# Patient Record
Sex: MALE | Race: WHITE | Employment: OTHER | URBAN - METROPOLITAN AREA
[De-identification: names, ages, dates, MRNs, and addresses within clinical notes are randomized per-mention and may not be internally consistent; named-entity substitution may affect disease eponyms.]

---

## 2019-08-03 ENCOUNTER — APPOINTMENT (OUTPATIENT)
Dept: CT IMAGING | Age: 69
End: 2019-08-03
Attending: EMERGENCY MEDICINE
Payer: MEDICARE

## 2019-08-03 ENCOUNTER — HOSPITAL ENCOUNTER (EMERGENCY)
Age: 69
Discharge: HOME | End: 2019-08-03
Attending: EMERGENCY MEDICINE
Payer: MEDICARE

## 2019-08-03 VITALS
HEART RATE: 64 BPM | DIASTOLIC BLOOD PRESSURE: 84 MMHG | BODY MASS INDEX: 33.8 KG/M2 | TEMPERATURE: 98 F | OXYGEN SATURATION: 98 % | WEIGHT: 223 LBS | SYSTOLIC BLOOD PRESSURE: 164 MMHG | HEIGHT: 68 IN | RESPIRATION RATE: 20 BRPM

## 2019-08-03 DIAGNOSIS — R10.9 FLANK PAIN: Primary | ICD-10-CM

## 2019-08-03 DIAGNOSIS — R16.0 LIVER MASS: ICD-10-CM

## 2019-08-03 LAB
ANION GAP SERPL CALC-SCNC: 7 MMOL/L (ref 7–16)
BACTERIA URNS QL MICRO: 0 /HPF
BASOPHILS # BLD: 0 K/UL (ref 0–0.2)
BASOPHILS NFR BLD: 0 % (ref 0–2)
BUN SERPL-MCNC: 24 MG/DL (ref 8–23)
CALCIUM SERPL-MCNC: 8.3 MG/DL (ref 8.3–10.4)
CASTS URNS QL MICRO: NORMAL /LPF
CHLORIDE SERPL-SCNC: 107 MMOL/L (ref 98–107)
CO2 SERPL-SCNC: 28 MMOL/L (ref 21–32)
CREAT SERPL-MCNC: 1.06 MG/DL (ref 0.8–1.5)
DIFFERENTIAL METHOD BLD: NORMAL
EOSINOPHIL # BLD: 0.1 K/UL (ref 0–0.8)
EOSINOPHIL NFR BLD: 1 % (ref 0.5–7.8)
EPI CELLS #/AREA URNS HPF: NORMAL /HPF
ERYTHROCYTE [DISTWIDTH] IN BLOOD BY AUTOMATED COUNT: 12.8 % (ref 11.9–14.6)
GLUCOSE SERPL-MCNC: 106 MG/DL (ref 65–100)
HCT VFR BLD AUTO: 44.5 % (ref 41.1–50.3)
HGB BLD-MCNC: 14.4 G/DL (ref 13.6–17.2)
IMM GRANULOCYTES # BLD AUTO: 0 K/UL (ref 0–0.5)
IMM GRANULOCYTES NFR BLD AUTO: 0 % (ref 0–5)
LYMPHOCYTES # BLD: 2 K/UL (ref 0.5–4.6)
LYMPHOCYTES NFR BLD: 23 % (ref 13–44)
MCH RBC QN AUTO: 29 PG (ref 26.1–32.9)
MCHC RBC AUTO-ENTMCNC: 32.4 G/DL (ref 31.4–35)
MCV RBC AUTO: 89.5 FL (ref 79.6–97.8)
MONOCYTES # BLD: 0.8 K/UL (ref 0.1–1.3)
MONOCYTES NFR BLD: 9 % (ref 4–12)
NEUTS SEG # BLD: 5.7 K/UL (ref 1.7–8.2)
NEUTS SEG NFR BLD: 67 % (ref 43–78)
NRBC # BLD: 0 K/UL (ref 0–0.2)
PLATELET # BLD AUTO: 235 K/UL (ref 150–450)
PMV BLD AUTO: 9.8 FL (ref 9.4–12.3)
POTASSIUM SERPL-SCNC: 4.2 MMOL/L (ref 3.5–5.1)
RBC # BLD AUTO: 4.97 M/UL (ref 4.23–5.6)
RBC #/AREA URNS HPF: NORMAL /HPF
SODIUM SERPL-SCNC: 142 MMOL/L (ref 136–145)
WBC # BLD AUTO: 8.6 K/UL (ref 4.3–11.1)
WBC URNS QL MICRO: NORMAL /HPF

## 2019-08-03 PROCEDURE — 96360 HYDRATION IV INFUSION INIT: CPT | Performed by: EMERGENCY MEDICINE

## 2019-08-03 PROCEDURE — 85025 COMPLETE CBC W/AUTO DIFF WBC: CPT

## 2019-08-03 PROCEDURE — 74176 CT ABD & PELVIS W/O CONTRAST: CPT

## 2019-08-03 PROCEDURE — 81003 URINALYSIS AUTO W/O SCOPE: CPT | Performed by: EMERGENCY MEDICINE

## 2019-08-03 PROCEDURE — 99284 EMERGENCY DEPT VISIT MOD MDM: CPT | Performed by: EMERGENCY MEDICINE

## 2019-08-03 PROCEDURE — 74011250636 HC RX REV CODE- 250/636: Performed by: EMERGENCY MEDICINE

## 2019-08-03 PROCEDURE — 81015 MICROSCOPIC EXAM OF URINE: CPT

## 2019-08-03 PROCEDURE — 80048 BASIC METABOLIC PNL TOTAL CA: CPT

## 2019-08-03 RX ORDER — TRAMADOL HYDROCHLORIDE 50 MG/1
50 TABLET ORAL
Qty: 9 TAB | Refills: 0 | Status: SHIPPED | OUTPATIENT
Start: 2019-08-03 | End: 2019-08-06

## 2019-08-03 RX ADMIN — SODIUM CHLORIDE 1000 ML: 900 INJECTION, SOLUTION INTRAVENOUS at 13:24

## 2019-08-03 NOTE — DISCHARGE INSTRUCTIONS
Patient Education   Patient Education        Liver Cancer: Care Instructions  Your Care Instructions    Liver cancer occurs when abnormal cells grow out of control in the liver. Liver cancer can spread to other parts of the body, such as the lungs, bones, or the lymph nodes and tissues in the belly. Treatment depends on what type of liver cancer you have and how far it has spread. You may need more than one kind of treatment, such as medicine, surgery, chemotherapy, or radiation therapy. In some cases, other treatments or a liver transplant may be needed. If your cancer cannot be cured, the goal may be to remove or destroy as much of the tumor as possible. This can prevent cancer from growing, spreading, or returning for as long as possible. Your medical team will work with you to help manage the treatment side effects. These can include feeling very tired, feeling sick to your stomach, or having a higher risk for infections. Finding out that you have cancer is scary. You may feel many emotions and may need some help coping. Seek out family, friends, and counselors for support. You also can do things at home to make yourself feel better while you go through treatment. Call the [employer] (3-160.760.2244) or visit its website at [employer] for more information. Follow-up care is a key part of your treatment and safety. Be sure to make and go to all appointments, and call your doctor if you are having problems. It's also a good idea to know your test results and keep a list of the medicines you take. How can you care for yourself at home? · Take your medicines exactly as prescribed. Call your doctor if you have any problems with your medicine. You may get medicine for nausea and vomiting if you have these side effects. · Eat healthy food. If you do not feel like eating, try to eat food that has protein and extra calories to keep up your strength and prevent weight loss.  Drink liquid meal replacements for extra calories and protein. Try to eat your main meal early. Some people do better with small, frequent meals rather than one or two large ones. · Get some physical activity every day, but do not get too tired. Keep doing the hobbies you enjoy as your energy allows. · Take steps to control your stress and workload. Learn relaxation techniques. ? Share your feelings. Stress and tension affect our emotions. By expressing your feelings to others, you may be able to understand and cope with them. ? Consider joining a support group. Talking about a problem with your spouse, a good friend, or other people with similar problems is a good way to reduce tension and stress. ? Express yourself through art. Try writing, crafts, dance, or art to relieve stress. Some dance, writing, or art groups may be available just for people who have cancer. ? Be kind to your body and mind. Getting enough sleep, eating a healthy diet, and taking time to do things you enjoy can contribute to an overall feeling of balance in your life and can help reduce stress. ? Get help if you need it. Discuss your concerns with your doctor, counselor, or other health professional.  · If you are vomiting or have diarrhea:  ? Drink plenty of fluids (enough so that your urine is light yellow or clear like water) to prevent dehydration. Choose water and other caffeine-free clear liquids. If you have kidney, heart, or liver disease and have to limit fluids, talk with your doctor before you increase the amount of fluids you drink. ? When you are able to eat, try clear soups, mild foods, and liquids until all symptoms are gone for 12 to 48 hours. Other good choices include dry toast, crackers, cooked cereal, and gelatin dessert, such as Jell-O.  · If you have not already done so, prepare a list of advance directives.  Advance directives are instructions to your doctor and family members about what kind of care you want if you become unable to speak or express yourself. When should you call for help? Call 911 anytime you think you may need emergency care. For example, call if:    · You have trouble breathing.     · You vomit blood or what looks like coffee grounds.    Call your doctor now or seek immediate medical care if:    · You have any abnormal bleeding, such as:  ? Nosebleeds. ? Vaginal bleeding that is different (heavier, more frequent, at a different time of the month) than what you are used to.  ? Bloody or black stools, or rectal bleeding. ? Bloody or pink urine.     · You have a fever.     · You feel very sleepy or confused.     · You have new or worse belly pain.     · There is a new or increasing yellow tint to your skin or the whites of your eyes.    Watch closely for changes in your health, and be sure to contact your doctor if:    · You have any problems.     · You are gaining weight.     · Your belly is getting bigger. Where can you learn more? Go to http://dimitri-vicki.info/. Enter P483 in the search box to learn more about \"Liver Cancer: Care Instructions. \"  Current as of: December 19, 2018  Content Version: 12.1  © 9807-1237 [employer]. Care instructions adapted under license by [employer] (which disclaims liability or warranty for this information). If you have questions about a medical condition or this instruction, always ask your healthcare professional. [place] [address] any warranty or liability for your use of this information. Flank Pain: Care Instructions  Your Care Instructions  Flank pain is pain on the side of the back just below the rib cage and above the waist. It can be on one or both sides. Flank pain has many possible causes, including a kidney stone, a urinary tract infection, or back strain. Flank pain may get better on its own.  But don't ignore new symptoms, such as fever, nausea and vomiting, urination problems, pain that gets worse, and dizziness. These may be signs of a more serious problem. You may have to have tests or other treatment. Follow-up care is a key part of your treatment and safety. Be sure to make and go to all appointments, and call your doctor if you are having problems. It's also a good idea to know your test results and keep a list of the medicines you take. How can you care for yourself at home? · Rest until you feel better. · Take pain medicines exactly as directed. ? If the doctor gave you a prescription medicine for pain, take it as prescribed. ? If you are not taking a prescription pain medicine, ask your doctor if you can take an over-the-counter pain medicine, such as acetaminophen (Tylenol), ibuprofen (Advil, Motrin), or naproxen (Aleve). Read and follow all instructions on the label. · If your doctor prescribed antibiotics, take them as directed. Do not stop taking them just because you feel better. You need to take the full course of antibiotics. · To apply heat, put a warm water bottle, a heating pad set on low, or a warm cloth on the painful area. Do not go to sleep with a heating pad on your skin. · To prevent dehydration, drink plenty of fluids, enough so that your urine is light yellow or clear like water. Choose water and other caffeine-free clear liquids until you feel better. If you have kidney, heart, or liver disease and have to limit fluids, talk with your doctor before you increase the amount of fluids you drink. When should you call for help? Call your doctor now or seek immediate medical care if:    · Your flank pain gets worse.     · You have new symptoms, such as fever, nausea, or vomiting.     · You have symptoms of a urinary problem. For example:  ? You have blood or pus in your urine. ? You have chills or body aches. ? It hurts to urinate. ?  You have groin or belly pain.    Watch closely for changes in your health, and be sure to contact your doctor if you do not get better as expected. Where can you learn more? Go to http://dimitri-vicki.info/. Enter S191 in the search box to learn more about \"Flank Pain: Care Instructions. \"  Current as of: September 23, 2018  Content Version: 12.1  © 9070-7228 [employer]. Care instructions adapted under license by [employer] (which disclaims liability or warranty for this information). If you have questions about a medical condition or this instruction, always ask your healthcare professional. [address] any warranty or liability for your use of this information.

## 2019-08-03 NOTE — ED NOTES
I have reviewed discharge instructions with the patient and spouse. The patient and spouse verbalized understanding. Patient left ED via Discharge Method: ambulatory to Home with his wife, Kyle Lincoln. Opportunity for questions and clarification provided. Patient given 1 scripts. To continue your aftercare when you leave the hospital, you may receive an automated call from our care team to check in on how you are doing. This is a free service and part of our promise to provide the best care and service to meet your aftercare needs.  If you have questions, or wish to unsubscribe from this service please call [phone]. Thank you for Choosing our [facility] Emergency Department.

## 2019-08-03 NOTE — ED PROVIDER NOTES
77-year-old white male presents with acute onset right-sided flank pain around noon today. He did have nausea but no vomiting or fever. No urinary changes other than decreased urine output during the night last night. Pain radiates toward the right lower quadrant. No aggravating or alleviating factors. Pain was initially severe but is now essentially resolved. The history is provided by the patient. Flank Pain    Pertinent negatives include no chest pain, no fever, no headaches and no abdominal pain. History reviewed. No pertinent past medical history. Past Surgical History:   Procedure Laterality Date    HX HEENT      HX ORTHOPAEDIC      HX TONSIL AND ADENOIDECTOMY           History reviewed. No pertinent family history.     Social History     Socioeconomic History    Marital status:      Spouse name: Not on file    Number of children: Not on file    Years of education: Not on file    Highest education level: Not on file   Occupational History    Not on file   Social Needs    Financial resource strain: Not on file    Food insecurity:     Worry: Not on file     Inability: Not on file    Transportation needs:     Medical: Not on file     Non-medical: Not on file   Tobacco Use    Smoking status: Never Smoker    Smokeless tobacco: Never Used   Substance and Sexual Activity    Alcohol use: Yes     Comment: occassionally    Drug use: Never    Sexual activity: Yes     Partners: Female   Lifestyle    Physical activity:     Days per week: Not on file     Minutes per session: Not on file    Stress: Not on file   Relationships    Social connections:     Talks on phone: Not on file     Gets together: Not on file     Attends Orthodoxy service: Not on file     Active member of club or organization: Not on file     Attends meetings of clubs or organizations: Not on file     Relationship status: Not on file    Intimate partner violence:     Fear of current or ex partner: Not on file Emotionally abused: Not on file     Physically abused: Not on file     Forced sexual activity: Not on file   Other Topics Concern    Not on file   Social History Narrative    Not on file         ALLERGIES: Patient has no known allergies. Review of Systems   Constitutional: Negative for fever. HENT: Negative for congestion. Respiratory: Negative for cough and shortness of breath. Cardiovascular: Negative for chest pain. Gastrointestinal: Positive for nausea. Negative for abdominal pain and vomiting. Genitourinary: Positive for flank pain. Musculoskeletal: Negative for back pain and neck pain. Skin: Negative for rash. Neurological: Negative for headaches. Vitals:    08/03/19 1235 08/03/19 1322   BP: (!) 180/95    Pulse: (!) 51    Resp: 20    Temp: 97.6 °F (36.4 °C)    SpO2: 97% 98%   Weight: 101.2 kg (223 lb)    Height: 5' 8\" (1.727 m)             Physical Exam   Constitutional: He is oriented to person, place, and time. He appears well-developed and well-nourished. No distress. HENT:   Head: Normocephalic and atraumatic. Eyes: Pupils are equal, round, and reactive to light. Conjunctivae are normal.   Neck: Normal range of motion. Neck supple. Cardiovascular: Normal rate and regular rhythm. Pulmonary/Chest: Effort normal and breath sounds normal.   Abdominal: Soft. He exhibits no distension. There is no tenderness. There is no guarding. Musculoskeletal: Normal range of motion. He exhibits no edema. Neurological: He is alert and oriented to person, place, and time. Skin: Skin is warm and dry. Psychiatric: He has a normal mood and affect. His behavior is normal.   Nursing note and vitals reviewed. MDM  Number of Diagnoses or Management Options  Diagnosis management comments: Lead work is unremarkable. Analysis shows no infection. CT abdomen pelvis shows no renal or ureteral stones. No explanation for his pain.   There is an incidental 3.2 cm x 2.5 cm left hepatic lobe lesion of uncertain etiology. It is unlikely to be related to his pain. She does from [place] and will not be returning home for approximately 1 week. I would prefer to have the MRI performed within this time. Therefore will order an outpatient MRI to be performed this coming Monday or Tuesday. We will have the patient call MRI scheduling Monday morning for time. Patient is comfortable and appears safe for discharge home.         Amount and/or Complexity of Data Reviewed  Clinical lab tests: ordered and reviewed  Tests in the radiology section of CPT®: ordered and reviewed  Independent visualization of images, tracings, or specimens: yes    Risk of Complications, Morbidity, and/or Mortality  Presenting problems: moderate  Diagnostic procedures: moderate  Management options: moderate           Procedures

## 2019-08-03 NOTE — ED TRIAGE NOTES
Pt states that he had a sudden onset of right flank pain this morning with nausea and vomiting.   Recently treated for bronchitis with an antibiotic and codeine cough syrup

## 2020-10-09 PROBLEM — M10.9 GOUT: Status: ACTIVE | Noted: 2020-10-09

## 2020-10-09 PROBLEM — G50.0 TRIGEMINAL NEURALGIA: Status: ACTIVE | Noted: 2020-10-09

## 2020-11-06 PROBLEM — N40.1 BENIGN PROSTATIC HYPERPLASIA WITH LOWER URINARY TRACT SYMPTOMS: Status: ACTIVE | Noted: 2020-11-06

## 2020-11-06 PROBLEM — E78.00 PURE HYPERCHOLESTEROLEMIA: Status: ACTIVE | Noted: 2020-11-06

## 2020-11-06 PROBLEM — N52.9 ERECTILE DYSFUNCTION: Status: ACTIVE | Noted: 2020-11-06

## 2021-02-09 ENCOUNTER — HOSPITAL ENCOUNTER (OUTPATIENT)
Dept: SURGERY | Age: 71
Discharge: HOME | End: 2021-02-09
Attending: ORTHOPAEDIC SURGERY
Payer: MEDICARE

## 2021-02-09 ENCOUNTER — HOSPITAL ENCOUNTER (OUTPATIENT)
Dept: PHYSICAL THERAPY | Age: 71
Discharge: HOME | End: 2021-02-09
Attending: ORTHOPAEDIC SURGERY
Payer: MEDICARE

## 2021-02-09 ENCOUNTER — HOME HEALTH ADMISSION (OUTPATIENT)
Dept: HOME HEALTH SERVICES | Facility: HOME HEALTH | Age: 71
End: 2021-02-09
Payer: MEDICARE

## 2021-02-09 VITALS
TEMPERATURE: 97.9 F | BODY MASS INDEX: 35.52 KG/M2 | RESPIRATION RATE: 14 BRPM | SYSTOLIC BLOOD PRESSURE: 145 MMHG | HEART RATE: 72 BPM | OXYGEN SATURATION: 96 % | DIASTOLIC BLOOD PRESSURE: 75 MMHG | HEIGHT: 68 IN | WEIGHT: 234.4 LBS

## 2021-02-09 LAB
ALBUMIN SERPL-MCNC: 3.6 G/DL (ref 3.2–4.6)
ANION GAP SERPL CALC-SCNC: 4 MMOL/L (ref 7–16)
APTT PPP: 27.7 SEC (ref 24.1–35.1)
BACTERIA SPEC CULT: NORMAL
BASOPHILS # BLD: 0 K/UL (ref 0–0.2)
BASOPHILS NFR BLD: 1 % (ref 0–2)
BUN SERPL-MCNC: 24 MG/DL (ref 8–23)
CALCIUM SERPL-MCNC: 8.8 MG/DL (ref 8.3–10.4)
CHLORIDE SERPL-SCNC: 107 MMOL/L (ref 98–107)
CO2 SERPL-SCNC: 29 MMOL/L (ref 21–32)
CREAT SERPL-MCNC: 0.82 MG/DL (ref 0.8–1.5)
DIFFERENTIAL METHOD BLD: NORMAL
EOSINOPHIL # BLD: 0.1 K/UL (ref 0–0.8)
EOSINOPHIL NFR BLD: 2 % (ref 0.5–7.8)
ERYTHROCYTE [DISTWIDTH] IN BLOOD BY AUTOMATED COUNT: 13.2 % (ref 11.9–14.6)
EST. AVERAGE GLUCOSE BLD GHB EST-MCNC: 134 MG/DL
GLUCOSE SERPL-MCNC: 114 MG/DL (ref 65–100)
HBA1C MFR BLD: 6.3 % (ref 4.2–6.3)
HCT VFR BLD AUTO: 43 % (ref 41.1–50.3)
HGB BLD-MCNC: 14.2 G/DL (ref 13.6–17.2)
IMM GRANULOCYTES # BLD AUTO: 0 K/UL (ref 0–0.5)
IMM GRANULOCYTES NFR BLD AUTO: 1 % (ref 0–5)
INR PPP: 1
LYMPHOCYTES # BLD: 2 K/UL (ref 0.5–4.6)
LYMPHOCYTES NFR BLD: 23 % (ref 13–44)
MCH RBC QN AUTO: 28.9 PG (ref 26.1–32.9)
MCHC RBC AUTO-ENTMCNC: 33 G/DL (ref 31.4–35)
MCV RBC AUTO: 87.6 FL (ref 79.6–97.8)
MONOCYTES # BLD: 0.6 K/UL (ref 0.1–1.3)
MONOCYTES NFR BLD: 7 % (ref 4–12)
NEUTS SEG # BLD: 5.7 K/UL (ref 1.7–8.2)
NEUTS SEG NFR BLD: 67 % (ref 43–78)
NRBC # BLD: 0 K/UL (ref 0–0.2)
PLATELET # BLD AUTO: 264 K/UL (ref 150–450)
PMV BLD AUTO: 9.9 FL (ref 9.4–12.3)
POTASSIUM SERPL-SCNC: 4.4 MMOL/L (ref 3.5–5.1)
PROTHROMBIN TIME: 13.2 SEC (ref 12.5–14.7)
RBC # BLD AUTO: 4.91 M/UL (ref 4.23–5.6)
SERVICE CMNT-IMP: NORMAL
SODIUM SERPL-SCNC: 140 MMOL/L (ref 136–145)
WBC # BLD AUTO: 8.5 K/UL (ref 4.3–11.1)

## 2021-02-09 PROCEDURE — 97161 PT EVAL LOW COMPLEX 20 MIN: CPT

## 2021-02-09 PROCEDURE — 85730 THROMBOPLASTIN TIME PARTIAL: CPT

## 2021-02-09 PROCEDURE — 85025 COMPLETE CBC W/AUTO DIFF WBC: CPT

## 2021-02-09 PROCEDURE — 80048 BASIC METABOLIC PNL TOTAL CA: CPT

## 2021-02-09 PROCEDURE — 77030027138 HC INCENT SPIROMETER -A

## 2021-02-09 PROCEDURE — 87641 MR-STAPH DNA AMP PROBE: CPT

## 2021-02-09 PROCEDURE — 93005 ELECTROCARDIOGRAM TRACING: CPT

## 2021-02-09 PROCEDURE — 85610 PROTHROMBIN TIME: CPT

## 2021-02-09 PROCEDURE — 83036 HEMOGLOBIN GLYCOSYLATED A1C: CPT

## 2021-02-09 PROCEDURE — 80307 DRUG TEST PRSMV CHEM ANLYZR: CPT

## 2021-02-09 PROCEDURE — 82040 ASSAY OF SERUM ALBUMIN: CPT

## 2021-02-09 PROCEDURE — 36415 COLL VENOUS BLD VENIPUNCTURE: CPT

## 2021-02-09 RX ORDER — ASCORBIC ACID 500 MG
500 TABLET ORAL DAILY
COMMUNITY

## 2021-02-09 NOTE — PROGRESS NOTES
Megna Bernabe  : [birth year](89 y.o.) [facility] at [address], [place] [address].  [phone]       Physical Therapy Prehab Plan of Treatment and Evaluation Summary:2021    ICD-10: Treatment Diagnosis:   · Pain in Right Knee (M25.561)  · Stiffness of Right Knee, Not elsewhere classified (M25.661)  Precautions/Allergies:   Patient has no known allergies. 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 He is planning on going home at [place] with his spouse's assist. He is motivated and prepared. PROBLEM LIST (Impacting functional limitations):  Mr. Marleen Cowan presents with the following right lower extremity(s) problems:  1. Strength  2. Range of Motion  3. Home Exercise Program  4. Pain   INTERVENTIONS PLANNED:  1. Home Exercise Program  2. Educational Discussion      TREATMENT PLAN: Effective Dates: 2021 TO 2021. Frequency/Duration: Patient to continue to perform home exercise program at least twice per day up until his surgery. GOALS: (Goals have been discussed and agreed upon with patient.)  Discharge Goals: Time Frame: 1 Day  1. Patient will demonstrate independence with a home exercise program designed to increase strength, range of motion and pain control to minimize functional deficits and optimize patient for total joint replacement. Rehabilitation Potential For Stated Goals: Good  Regarding Patrice Irizarry's therapy, I certify that the treatment plan above will be carried out by a therapist or under their direction.   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 Nicolette Flores  has a past medical history of Allergies, Arthritis, Calculus of kidney, Cataracts, bilateral, Headache, History of elevated PSA, and Trigeminal neuralgia. Mr. Nicolette Flores  has a past surgical history that includes hx tonsil and adenoidectomy; hx prostate surgery (2015); hx heent; hx cataract removal (Bilateral); hx colonoscopy; and hx orthopaedic (Right, 2012, 2014). Social History/Living Environment:   Home Environment: Private residence  # Steps to Enter: 3  Rails to Enter: No  One/Two Story Residence: One story(1 step to a sunken den.  No rail)  Support Systems: Spouse/Significant Other/Partner  Patient Expects to be Discharged to[de-identified] Private residence  Current DME Used/Available at Home: Jewell Medellin, keisha, Froylan Diane, rolling, [facility], Shower chair, Raised toilet seat  Tub or Shower Type: Shower  Work/Activity:  retired  Dominant Side:  LEFT  Current Medications:  See Pre-assessment nursing note   Number of Personal Factors/Comorbidities that affect the Plan of Care: 1-2: MODERATE COMPLEXITY   EXAMINATION:   ADLs (Current Functional Status):   Ambulation:  [x] Independent  [] Walk Indoors Only  [x] Walk Outdoors  [] Use Assistive Device  [] Use Wheelchair Only Dressing:  [x] Independent  Requires Assistance from Someone for:  [] Sock/Shoes  [] Pants  [] Everything   Bathing/Showering:   [x] Independent  [] Requires Assistance from Someone  [] [address]:  [x] Routine house and yard work  [] Light Housework Only  [] None   Observation/Orthostatic Postural Assessment:    Genu varus left, Genu varus right, Leg length discrepancy, right(R LE 1/8\" shorter than L)  ROM/Flexibility:   AROM: Within functional limits(L LE)                       RLE AROM  R Knee Flexion: 101  R Knee Extension: 15   Strength:   Strength: Generally decreased, functional(L LE 4/5)              RLE Strength  R Hip Flexion: 4  R Knee Extension: 3+  R Ankle Dorsiflexion: 4   Functional Mobility:    Sensation: Intact(L LE)    Stand to Sit: Independent  Sit to Stand: Independent  Stand Pivot Transfers: Independent             Balance:    Sitting: Intact  Standing: Intact   Body Structures Involved:  1. Bones  2. Joints  3. Muscles Body Functions Affected:  1. Movement Related Activities and Participation Affected:  1. General Tasks and Demands  2. Mobility   Number of elements that affect the Plan of Care: 4+: HIGH COMPLEXITY   CLINICAL PRESENTATION:   Presentation: Stable and uncomplicated: LOW COMPLEXITY   CLINICAL DECISION MAKING:   Outcome Measure: Tool Used: Lower Extremity Functional Scale (LEFS)  Score:  Initial: 24/80 Most Recent: X/80 (Date: -- )   Interpretation of Score: 20 questions each scored on a 5 point scale with 0 representing \"extreme difficulty or unable to perform\" and 4 representing \"no difficulty\". The lower the score, the greater the functional disability. 80/80 represents no disability. Minimal detectable change is 9 points. Medical Necessity:   · Mr. Yrn Fischer is expected to optimize his lower extremity strength and ROM in preparation for joint replacement surgery. Reason for Services/Other Comments:  · Achieve baseline assesment of musculoskeletal system, functional mobility and home environment. , educate in PT HEP in preparation for surgery, educate in hospital plan of care. Use of outcome tool(s) and clinical judgement create a POC that gives a: Clear prediction of patient's progress: LOW COMPLEXITY   TREATMENT:   Treatment/Session Assessment:  Patient was instructed in PT- HEP to increase strength and ROM in LEs. Answered all questions. · Post session pain:  2  · Compliance with Program/Exercises: compliant most of the time.   Total Treatment Duration:  PT Patient Time In/Time Out  Time In: 1215  Time Out: [address], [address]

## 2021-02-09 NOTE — PERIOP NOTES
PLEASE CONTINUE TAKING ALL PRESCRIPTION MEDICATIONS UP TO THE DAY OF SURGERY UNLESS OTHERWISE DIRECTED BELOW. DISCONTINUE all vitamins and supplements 21 days prior to surgery. DISCONTINUE Non-Steriodal Anti-Inflammatory (NSAIDS) such as Advil and Aleve 5 days prior to surgery. Home Medications to take  the day of surgery   Gabapentin  loratadine            Home Medications   to Hold           Comments    [place] [address] [place]    May continue vitamin C, and zinc up until day of surgery   *Visitor policy of 1 visitor per patient discussed. Please do not bring home medications with you on the day of surgery unless otherwise directed by your nurse. If you are instructed to bring home medications, please give them to your nurse as they will be administered by the nursing staff. If you have any questions, please call [facility] [phone]   A copy of this note was provided to the patient for reference.

## 2021-02-09 NOTE — PROGRESS NOTES
[facility] Case Management note:  Patient screened in Prehab for discharge planning needs. Patient scheduled for a future total joint replacement. We discussed Home Health and equipment needed after surgery. List of Home Health providers offered. Patient w/o preference towards provider. Initial referral sent to [facility].   Will need a walker and will let us know if needs [facility]

## 2021-02-09 NOTE — PERIOP NOTES
Lab results within limits per anesthesia protocol; OK for surgery. Recent Results (from the past 12 hour(s))   CBC WITH AUTOMATED DIFF    Collection Time: 02/09/21 12:09 PM   Result Value Ref Range    WBC 8.5 4.3 - 11.1 K/uL    RBC 4.91 4.23 - 5.6 M/uL    HGB 14.2 13.6 - 17.2 g/dL    HCT 43.0 41.1 - 50.3 %    MCV 87.6 79.6 - 97.8 FL    MCH 28.9 26.1 - 32.9 PG    MCHC 33.0 31.4 - 35.0 g/dL    RDW 13.2 11.9 - 14.6 %    PLATELET 597 017 - 587 K/uL    MPV 9.9 9.4 - 12.3 FL    ABSOLUTE NRBC 0.00 0.0 - 0.2 K/uL    DF AUTOMATED      NEUTROPHILS 67 43 - 78 %    LYMPHOCYTES 23 13 - 44 %    MONOCYTES 7 4.0 - 12.0 %    EOSINOPHILS 2 0.5 - 7.8 %    BASOPHILS 1 0.0 - 2.0 %    IMMATURE GRANULOCYTES 1 0.0 - 5.0 %    ABS. NEUTROPHILS 5.7 1.7 - 8.2 K/UL    ABS. LYMPHOCYTES 2.0 0.5 - 4.6 K/UL    ABS. MONOCYTES 0.6 0.1 - 1.3 K/UL    ABS. EOSINOPHILS 0.1 0.0 - 0.8 K/UL    ABS. BASOPHILS 0.0 0.0 - 0.2 K/UL    ABS. IMM.  GRANS. 0.0 0.0 - 0.5 K/UL   HEMOGLOBIN A1C WITH EAG    Collection Time: 02/09/21 12:09 PM   Result Value Ref Range    Hemoglobin A1c 6.3 4.20 - 6.30 %    Est. average glucose 024 mg/dL   METABOLIC PANEL, BASIC    Collection Time: 02/09/21 12:09 PM   Result Value Ref Range    Sodium 140 136 - 145 mmol/L    Potassium 4.4 3.5 - 5.1 mmol/L    Chloride 107 98 - 107 mmol/L    CO2 29 21 - 32 mmol/L    Anion gap 4 (L) 7 - 16 mmol/L    Glucose 114 (H) 65 - 100 mg/dL    BUN 24 (H) 8 - 23 MG/DL    Creatinine 0.82 0.8 - 1.5 MG/DL    GFR est AA >60 >60 ml/min/1.73m2    GFR est non-AA >60 >60 ml/min/1.73m2    Calcium 8.8 8.3 - 10.4 MG/DL   PROTHROMBIN TIME + INR    Collection Time: 02/09/21 12:09 PM   Result Value Ref Range    Prothrombin time 13.2 12.5 - 14.7 sec    INR 1.0     PTT    Collection Time: 02/09/21 12:09 PM   Result Value Ref Range    aPTT 27.7 24.1 - 35.1 SEC   ALBUMIN    Collection Time: 02/09/21 12:09 PM   Result Value Ref Range    Albumin 3.6 3.2 - 4.6 g/dL

## 2021-02-09 NOTE — PROGRESS NOTES
21 1200   Oxygen Therapy   O2 Sat (%) 96 %   Pulse via Oximetry 80 beats per minute   O2 Device Room air   Pre-Treatment   Breath Sounds Bilateral Clear;Diminished   Pre FEV1 (liters) 2.2 liters   % Predicted 75     Initial respiratory Assessment completed with pt. Pt was interviewed and evaluated in [facility] prior to surgery. Patient ID:  Crissy Salvador  [id number]  82 y.o.  1950  Surgeon: Dr. Diamond Canseco  Date of Surgery: The linked surgery was not found. Please check manually. Procedure: Total Right Knee Arthroplasty  Primary Care Physician: Devon Peña [place] [phone]  Specialists:     Pt. IS SOMEWHAT PRACTICING SOCIAL DISTANCING AND WEARING A MASK WHEN IN PUBLIC. Pt taught proper COUGH technique  DIAPHRAGMATIC BREATHING EXERCISE INSTRUCTIONS GIVEN    History of smokin/2 YEAR OCCASIONAL CIGAR                 Quit date:      10 YEARS AGO   Secondhand smoke:FATHER    Past procedures with Oxygen desaturation or delayed awakening:DELAYED AWAKENING    Past Medical History:   Diagnosis Date    Allergies     environmental    Arthritis     osteo    Calculus of kidney     Cataracts, bilateral     Headache     History of elevated PSA     BPH. stable w/meds    Sleep apnea     sleep study performed, pt could not tolerate Cpap    Trigeminal neuralgia 2014    right side of face/head        Respiratory history:T BRONCHITIS                                 SOB  ON EXERTION                                    Respiratory meds:  DENIES    FAMILY PRESENT:             NO                                                   PAST SLEEP STUDY:        YES               - IN [place]      HX OF CLAIRE:                        YES                     CLAIRE assessment:                                                 PHYSICAL EXAM   Body mass index is 35.64 kg/m².    Visit Vitals  BP (!) 145/75 (BP 1 Location: Right upper arm, BP Patient Position: Sitting)   Pulse 72   Temp 97.9 °F (36.6 °C)   Resp 14   Ht 5' 8\" (1.727 m)   Wt 106.3 kg (234 lb 6.4 oz)   SpO2 96%   BMI 35.64 kg/m²     Neck circumference:  48    cm    Loud snoring:                                                 YES             Witnessed apnea or wakening gasping or choking:        USED TO HAVE APNEA-PT STATES       Awakens with headaches:                                               DENIES  Morning or daytime tiredness/ sleepiness:                             TIRED  Dry mouth or sore throat in morning:            YES                                              [place] stage:  4                                   SACS score:80  Stop Bang   STOP-BANG  Does the patient snore loudly (louder than talking or loud enough to be heard through closed doors)?: Yes  Does the patient often feel tired, fatigued, or sleepy during the daytime, even after a \"good\" night's sleep?: Yes  Has anyone ever observed the patient stop breathing during their sleep? : Yes  Does the patient have or are they being treated for high blood pressure?: No  Is the patient's BMI greater than 35?: Yes  Is your neck circumference greater than 17 inches (Male) or 16 inches (Female)?: Yes  Is the patient older than 48?: Yes  Is the patient male?: Yes  CLAIRE Score: 7  Has the patient been referred to Sleep Medicine?: No  Has the patient previously been diagnosed with Obstructive Sleep Apnea?: Yes  Treated or Untreated?: Untreated                            PT NON-COMPLIANT with CPAP. Dangers of non-compliance with treatment of CLAIRE explained to pt. CLAIRE handouts given to pt. CONT. SAT MONITOR HS after surgery. O2 @ 3 lpm NC HS  If BMI 40 or greater PEP therapy QID.                                         Referrals:    Pt. Phone Number:

## 2021-02-09 NOTE — PERIOP NOTES
Patient verified name and . Order for consent  found in EHR and matches case posting; patient verified. Type III surgery, walk in assessment complete. Labs per surgeon: CBC,BMP, PT/PTT, albumin,hgb A1c, urine nicotin ; results within anesthesia protocol. Nicotine pending  Labs per anesthesia protocol: no additional  EKG:performed- results normal sinus rhythm. Patient COVID test date 21; Patient aware. The testing center [address] provided to the patient. MRSA/MSSA swab collected; pharmacy to review and dose antibiotic as appropriate. Hospital approved surgical skin cleanser and instructions to return bottle on DOS given per hospital policy. Patient provided with handouts including Guide to Surgery, Pain Management, Hand Hygiene, Blood Transfusion Education, and [facility] Brochure. Patient answered medical/surgical history questions at their best of ability. All prior to admission medications documented in [facility]. Original medication prescription bottle  visualized during patient appointment. Patient instructed to hold all vitamins 3 weeks prior to surgery and NSAIDS 5 days prior to surgery. Patient teach back successful and patient demonstrates knowledge of instruction.

## 2021-02-10 LAB
ATRIAL RATE: 72 BPM
CALCULATED P AXIS, ECG09: -29 DEGREES
CALCULATED R AXIS, ECG10: -11 DEGREES
CALCULATED T AXIS, ECG11: -2 DEGREES
COTININE UR QL SCN: NEGATIVE NG/ML
DIAGNOSIS, 93000: NORMAL
DRUG SCREEN COMMENT:, 753798: NORMAL
P-R INTERVAL, ECG05: 168 MS
Q-T INTERVAL, ECG07: 396 MS
QRS DURATION, ECG06: 94 MS
QTC CALCULATION (BEZET), ECG08: 433 MS
VENTRICULAR RATE, ECG03: 72 BPM

## 2021-02-11 NOTE — PERIOP NOTES
Nam Johnson [[id number]] (Order [id number])  Lab  Date: 2/9/2021 Department: [facility] Pre Assessment Released By: Elodia Bobo RN (auto-released) Authorizing: Romulo Eckert MD   Component Value Flag Ref Range Units Status   Cotinine Screen, urine Negative   Ymujmq=251 ng/mL Final   Drug Screen Comment: Comment

## 2021-02-25 RX ORDER — CEFAZOLIN SODIUM/WATER 2 G/20 ML
2 SYRINGE (ML) INTRAVENOUS ONCE
Status: CANCELLED | OUTPATIENT
Start: 2021-02-25 | End: 2021-02-25

## 2021-02-28 ENCOUNTER — ANESTHESIA EVENT (OUTPATIENT)
Dept: SURGERY | Age: 71
DRG: 470 | End: 2021-02-28
Payer: MEDICARE

## 2021-03-01 ENCOUNTER — HOSPITAL ENCOUNTER (INPATIENT)
Age: 71
LOS: 2 days | Discharge: HOME HEALTH | DRG: 470 | End: 2021-03-03
Attending: ORTHOPAEDIC SURGERY | Admitting: ORTHOPAEDIC SURGERY
Payer: MEDICARE

## 2021-03-01 ENCOUNTER — ANESTHESIA (OUTPATIENT)
Dept: SURGERY | Age: 71
DRG: 470 | End: 2021-03-01
Payer: MEDICARE

## 2021-03-01 DIAGNOSIS — M17.11 PRIMARY OSTEOARTHRITIS OF RIGHT KNEE: Primary | ICD-10-CM

## 2021-03-01 LAB
GLUCOSE BLD STRIP.AUTO-MCNC: 121 MG/DL (ref 65–100)
HGB BLD-MCNC: 13.7 G/DL (ref 13.6–17.2)

## 2021-03-01 PROCEDURE — 77030033067 HC SUT PDO STRATFX SPIR J&J -B: Performed by: ORTHOPAEDIC SURGERY

## 2021-03-01 PROCEDURE — 36415 COLL VENOUS BLD VENIPUNCTURE: CPT

## 2021-03-01 PROCEDURE — 77030003665 HC NDL SPN BBMI -A: Performed by: NURSE ANESTHETIST, CERTIFIED REGISTERED

## 2021-03-01 PROCEDURE — 74011250636 HC RX REV CODE- 250/636: Performed by: NURSE ANESTHETIST, CERTIFIED REGISTERED

## 2021-03-01 PROCEDURE — 74011250637 HC RX REV CODE- 250/637: Performed by: ANESTHESIOLOGY

## 2021-03-01 PROCEDURE — 97110 THERAPEUTIC EXERCISES: CPT

## 2021-03-01 PROCEDURE — 77030020044 HC CLD THERAPY UNIT -B

## 2021-03-01 PROCEDURE — 77030003665 HC NDL SPN BBMI -A: Performed by: ANESTHESIOLOGY

## 2021-03-01 PROCEDURE — 0SRC0JA REPLACEMENT OF RIGHT KNEE JOINT WITH SYNTHETIC SUBSTITUTE, UNCEMENTED, OPEN APPROACH: ICD-10-PCS | Performed by: ORTHOPAEDIC SURGERY

## 2021-03-01 PROCEDURE — 74011250637 HC RX REV CODE- 250/637: Performed by: NURSE PRACTITIONER

## 2021-03-01 PROCEDURE — 76060000034 HC ANESTHESIA 1.5 TO 2 HR: Performed by: ORTHOPAEDIC SURGERY

## 2021-03-01 PROCEDURE — 77030007880 HC KT SPN EPDRL BBMI -B: Performed by: NURSE ANESTHETIST, CERTIFIED REGISTERED

## 2021-03-01 PROCEDURE — 74011250636 HC RX REV CODE- 250/636: Performed by: ORTHOPAEDIC SURGERY

## 2021-03-01 PROCEDURE — 76010010054 HC POST OP PAIN BLOCK: Performed by: ORTHOPAEDIC SURGERY

## 2021-03-01 PROCEDURE — 97535 SELF CARE MNGMENT TRAINING: CPT

## 2021-03-01 PROCEDURE — 77030038023 HC PIN FIX MAKO STRY -B: Performed by: ORTHOPAEDIC SURGERY

## 2021-03-01 PROCEDURE — 77030029820: Performed by: ORTHOPAEDIC SURGERY

## 2021-03-01 PROCEDURE — 77030040361 HC SLV COMPR DVT MDII -B

## 2021-03-01 PROCEDURE — 77030003602 HC NDL NRV BLK BBMI -B: Performed by: ANESTHESIOLOGY

## 2021-03-01 PROCEDURE — 77030027520 HC SEAL BPLR AQMNTYS MEDT -F: Performed by: ORTHOPAEDIC SURGERY

## 2021-03-01 PROCEDURE — 65270000029 HC RM PRIVATE

## 2021-03-01 PROCEDURE — 94762 N-INVAS EAR/PLS OXIMTRY CONT: CPT

## 2021-03-01 PROCEDURE — 94760 N-INVAS EAR/PLS OXIMETRY 1: CPT

## 2021-03-01 PROCEDURE — 76010000875 HC OR TIME 1.5 TO 2HR INTENSV - TIER 2: Performed by: ORTHOPAEDIC SURGERY

## 2021-03-01 PROCEDURE — 74011000250 HC RX REV CODE- 250: Performed by: NURSE PRACTITIONER

## 2021-03-01 PROCEDURE — 8E0Y0CZ ROBOTIC ASSISTED PROCEDURE OF LOWER EXTREMITY, OPEN APPROACH: ICD-10-PCS | Performed by: ORTHOPAEDIC SURGERY

## 2021-03-01 PROCEDURE — C1776 JOINT DEVICE (IMPLANTABLE): HCPCS | Performed by: ORTHOPAEDIC SURGERY

## 2021-03-01 PROCEDURE — 77030038149 HC BLD SAW SAG STRY -D: Performed by: ORTHOPAEDIC SURGERY

## 2021-03-01 PROCEDURE — 74011250636 HC RX REV CODE- 250/636: Performed by: ANESTHESIOLOGY

## 2021-03-01 PROCEDURE — 82962 GLUCOSE BLOOD TEST: CPT

## 2021-03-01 PROCEDURE — 77030040922 HC BLNKT HYPOTHRM STRY -A: Performed by: ANESTHESIOLOGY

## 2021-03-01 PROCEDURE — 97161 PT EVAL LOW COMPLEX 20 MIN: CPT

## 2021-03-01 PROCEDURE — 85018 HEMOGLOBIN: CPT

## 2021-03-01 PROCEDURE — 74011000250 HC RX REV CODE- 250: Performed by: NURSE ANESTHETIST, CERTIFIED REGISTERED

## 2021-03-01 PROCEDURE — 97165 OT EVAL LOW COMPLEX 30 MIN: CPT

## 2021-03-01 PROCEDURE — 74011250636 HC RX REV CODE- 250/636: Performed by: NURSE PRACTITIONER

## 2021-03-01 PROCEDURE — 77030012935 HC DRSG AQUACEL BMS -B: Performed by: ORTHOPAEDIC SURGERY

## 2021-03-01 PROCEDURE — C1713 ANCHOR/SCREW BN/BN,TIS/BN: HCPCS | Performed by: ORTHOPAEDIC SURGERY

## 2021-03-01 PROCEDURE — 77030002922 HC SUT FBRWRE ARTH -B: Performed by: ORTHOPAEDIC SURGERY

## 2021-03-01 PROCEDURE — 2709999900 HC NON-CHARGEABLE SUPPLY: Performed by: ORTHOPAEDIC SURGERY

## 2021-03-01 PROCEDURE — 77030029828 HC FEM TIB CKPNT KT DISP STRY -B: Performed by: ORTHOPAEDIC SURGERY

## 2021-03-01 PROCEDURE — 76210000006 HC OR PH I REC 0.5 TO 1 HR: Performed by: ORTHOPAEDIC SURGERY

## 2021-03-01 PROCEDURE — 2709999900 HC NON-CHARGEABLE SUPPLY

## 2021-03-01 PROCEDURE — 76942 ECHO GUIDE FOR BIOPSY: CPT | Performed by: ORTHOPAEDIC SURGERY

## 2021-03-01 PROCEDURE — 77030002933 HC SUT MCRYL J&J -A: Performed by: ORTHOPAEDIC SURGERY

## 2021-03-01 DEVICE — CRUCIATE RETAINING FEMORAL
Type: IMPLANTABLE DEVICE | Site: KNEE | Status: FUNCTIONAL
Brand: TRIATHLON

## 2021-03-01 DEVICE — KNEE K2 TOT HEMI ADV CMTLS -- IMPL CAPPED K2: Type: IMPLANTABLE DEVICE | Status: FUNCTIONAL

## 2021-03-01 DEVICE — TIBIAL COMPONENT
Type: IMPLANTABLE DEVICE | Site: KNEE | Status: FUNCTIONAL
Brand: TRIATHLON

## 2021-03-01 DEVICE — INSERT TIB CS 5 10 MM ARTC KNEE BEAR TECHNOLOGY TRIATHLON: Type: IMPLANTABLE DEVICE | Site: KNEE | Status: FUNCTIONAL

## 2021-03-01 RX ORDER — CEFAZOLIN SODIUM/WATER 2 G/20 ML
2 SYRINGE (ML) INTRAVENOUS EVERY 8 HOURS
Status: COMPLETED | OUTPATIENT
Start: 2021-03-01 | End: 2021-03-02

## 2021-03-01 RX ORDER — CYCLOBENZAPRINE HCL 5 MG
5 TABLET ORAL
Qty: 30 TAB | Refills: 0 | Status: SHIPPED | OUTPATIENT
Start: 2021-03-01 | End: 2021-05-11 | Stop reason: SDUPTHER

## 2021-03-01 RX ORDER — TAMSULOSIN HYDROCHLORIDE 0.4 MG/1
0.4 CAPSULE ORAL DAILY
Status: DISCONTINUED | OUTPATIENT
Start: 2021-03-02 | End: 2021-03-03 | Stop reason: HOSPADM

## 2021-03-01 RX ORDER — AMOXICILLIN 250 MG
2 CAPSULE ORAL DAILY
Status: DISCONTINUED | OUTPATIENT
Start: 2021-03-02 | End: 2021-03-03 | Stop reason: HOSPADM

## 2021-03-01 RX ORDER — ONDANSETRON 2 MG/ML
4 INJECTION INTRAMUSCULAR; INTRAVENOUS
Status: DISCONTINUED | OUTPATIENT
Start: 2021-03-01 | End: 2021-03-03 | Stop reason: HOSPADM

## 2021-03-01 RX ORDER — ASPIRIN 81 MG/1
81 TABLET ORAL EVERY 12 HOURS
Status: DISCONTINUED | OUTPATIENT
Start: 2021-03-01 | End: 2021-03-03 | Stop reason: HOSPADM

## 2021-03-01 RX ORDER — ROPIVACAINE HYDROCHLORIDE 2 MG/ML
INJECTION, SOLUTION EPIDURAL; INFILTRATION; PERINEURAL AS NEEDED
Status: DISCONTINUED | OUTPATIENT
Start: 2021-03-01 | End: 2021-03-01 | Stop reason: HOSPADM

## 2021-03-01 RX ORDER — CEFAZOLIN SODIUM 1 G/3ML
INJECTION, POWDER, FOR SOLUTION INTRAMUSCULAR; INTRAVENOUS AS NEEDED
Status: DISCONTINUED | OUTPATIENT
Start: 2021-03-01 | End: 2021-03-01 | Stop reason: HOSPADM

## 2021-03-01 RX ORDER — MIDAZOLAM HYDROCHLORIDE 1 MG/ML
2 INJECTION, SOLUTION INTRAMUSCULAR; INTRAVENOUS ONCE
Status: COMPLETED | OUTPATIENT
Start: 2021-03-01 | End: 2021-03-01

## 2021-03-01 RX ORDER — AMOXICILLIN 250 MG
1 CAPSULE ORAL DAILY
Qty: 30 TAB | Refills: 0 | Status: SHIPPED | OUTPATIENT
Start: 2021-03-01 | End: 2022-04-08

## 2021-03-01 RX ORDER — GABAPENTIN 300 MG/1
300 CAPSULE ORAL 3 TIMES DAILY
Status: DISCONTINUED | OUTPATIENT
Start: 2021-03-01 | End: 2021-03-03 | Stop reason: HOSPADM

## 2021-03-01 RX ORDER — CEFAZOLIN SODIUM/WATER 2 G/20 ML
2 SYRINGE (ML) INTRAVENOUS ONCE
Status: COMPLETED | OUTPATIENT
Start: 2021-03-01 | End: 2021-03-01

## 2021-03-01 RX ORDER — KETOROLAC TROMETHAMINE 30 MG/ML
INJECTION, SOLUTION INTRAMUSCULAR; INTRAVENOUS AS NEEDED
Status: DISCONTINUED | OUTPATIENT
Start: 2021-03-01 | End: 2021-03-01 | Stop reason: HOSPADM

## 2021-03-01 RX ORDER — HYDROMORPHONE HYDROCHLORIDE 1 MG/ML
1 INJECTION, SOLUTION INTRAMUSCULAR; INTRAVENOUS; SUBCUTANEOUS
Status: DISCONTINUED | OUTPATIENT
Start: 2021-03-01 | End: 2021-03-03 | Stop reason: HOSPADM

## 2021-03-01 RX ORDER — SODIUM CHLORIDE 9 MG/ML
100 INJECTION, SOLUTION INTRAVENOUS CONTINUOUS
Status: DISPENSED | OUTPATIENT
Start: 2021-03-01 | End: 2021-03-02

## 2021-03-01 RX ORDER — LIDOCAINE HYDROCHLORIDE 10 MG/ML
0.1 INJECTION INFILTRATION; PERINEURAL AS NEEDED
Status: DISCONTINUED | OUTPATIENT
Start: 2021-03-01 | End: 2021-03-01 | Stop reason: HOSPADM

## 2021-03-01 RX ORDER — UREA 10 %
220 LOTION (ML) TOPICAL DAILY
Status: DISCONTINUED | OUTPATIENT
Start: 2021-03-02 | End: 2021-03-03 | Stop reason: HOSPADM

## 2021-03-01 RX ORDER — ACETAMINOPHEN 500 MG
1000 TABLET ORAL ONCE
Status: COMPLETED | OUTPATIENT
Start: 2021-03-01 | End: 2021-03-01

## 2021-03-01 RX ORDER — GLYCOPYRROLATE 0.2 MG/ML
INJECTION INTRAMUSCULAR; INTRAVENOUS AS NEEDED
Status: DISCONTINUED | OUTPATIENT
Start: 2021-03-01 | End: 2021-03-01 | Stop reason: HOSPADM

## 2021-03-01 RX ORDER — HYDROMORPHONE HYDROCHLORIDE 2 MG/1
2 TABLET ORAL
Qty: 42 TAB | Refills: 0 | Status: SHIPPED | OUTPATIENT
Start: 2021-03-01 | End: 2021-03-08

## 2021-03-01 RX ORDER — SODIUM CHLORIDE, SODIUM LACTATE, POTASSIUM CHLORIDE, CALCIUM CHLORIDE 600; 310; 30; 20 MG/100ML; MG/100ML; MG/100ML; MG/100ML
75 INJECTION, SOLUTION INTRAVENOUS CONTINUOUS
Status: DISCONTINUED | OUTPATIENT
Start: 2021-03-01 | End: 2021-03-01 | Stop reason: HOSPADM

## 2021-03-01 RX ORDER — CYCLOBENZAPRINE HCL 10 MG
5 TABLET ORAL
Status: DISCONTINUED | OUTPATIENT
Start: 2021-03-01 | End: 2021-03-03 | Stop reason: HOSPADM

## 2021-03-01 RX ORDER — ASCORBIC ACID 500 MG
500 TABLET ORAL DAILY
Status: DISCONTINUED | OUTPATIENT
Start: 2021-03-01 | End: 2021-03-01 | Stop reason: SDUPTHER

## 2021-03-01 RX ORDER — DEXAMETHASONE SODIUM PHOSPHATE 4 MG/ML
INJECTION, SOLUTION INTRA-ARTICULAR; INTRALESIONAL; INTRAMUSCULAR; INTRAVENOUS; SOFT TISSUE AS NEEDED
Status: DISCONTINUED | OUTPATIENT
Start: 2021-03-01 | End: 2021-03-01 | Stop reason: HOSPADM

## 2021-03-01 RX ORDER — LORATADINE 10 MG/1
10 TABLET ORAL DAILY
Status: DISCONTINUED | OUTPATIENT
Start: 2021-03-02 | End: 2021-03-03 | Stop reason: HOSPADM

## 2021-03-01 RX ORDER — ACETAMINOPHEN 500 MG
1000 TABLET ORAL
Qty: 60 TAB | Refills: 0 | Status: SHIPPED | OUTPATIENT
Start: 2021-03-01

## 2021-03-01 RX ORDER — NALOXONE HYDROCHLORIDE 0.4 MG/ML
0.2 INJECTION, SOLUTION INTRAMUSCULAR; INTRAVENOUS; SUBCUTANEOUS AS NEEDED
Status: DISCONTINUED | OUTPATIENT
Start: 2021-03-01 | End: 2021-03-01 | Stop reason: HOSPADM

## 2021-03-01 RX ORDER — FINASTERIDE 5 MG/1
5 TABLET, FILM COATED ORAL
Status: DISCONTINUED | OUTPATIENT
Start: 2021-03-01 | End: 2021-03-03 | Stop reason: HOSPADM

## 2021-03-01 RX ORDER — ROPIVACAINE HYDROCHLORIDE 2 MG/ML
INJECTION, SOLUTION EPIDURAL; INFILTRATION; PERINEURAL
Status: COMPLETED | OUTPATIENT
Start: 2021-03-01 | End: 2021-03-01

## 2021-03-01 RX ORDER — TRANEXAMIC ACID 650 1/1
1300 TABLET ORAL
Status: COMPLETED | OUTPATIENT
Start: 2021-03-01 | End: 2021-03-01

## 2021-03-01 RX ORDER — ONDANSETRON 2 MG/ML
INJECTION INTRAMUSCULAR; INTRAVENOUS AS NEEDED
Status: DISCONTINUED | OUTPATIENT
Start: 2021-03-01 | End: 2021-03-01 | Stop reason: HOSPADM

## 2021-03-01 RX ORDER — FENTANYL CITRATE 50 UG/ML
100 INJECTION, SOLUTION INTRAMUSCULAR; INTRAVENOUS ONCE
Status: COMPLETED | OUTPATIENT
Start: 2021-03-01 | End: 2021-03-01

## 2021-03-01 RX ORDER — HALOPERIDOL 5 MG/ML
1 INJECTION INTRAMUSCULAR
Status: DISCONTINUED | OUTPATIENT
Start: 2021-03-01 | End: 2021-03-01 | Stop reason: HOSPADM

## 2021-03-01 RX ORDER — SODIUM CHLORIDE, SODIUM LACTATE, POTASSIUM CHLORIDE, CALCIUM CHLORIDE 600; 310; 30; 20 MG/100ML; MG/100ML; MG/100ML; MG/100ML
100 INJECTION, SOLUTION INTRAVENOUS CONTINUOUS
Status: DISCONTINUED | OUTPATIENT
Start: 2021-03-01 | End: 2021-03-01 | Stop reason: HOSPADM

## 2021-03-01 RX ORDER — DEXAMETHASONE SODIUM PHOSPHATE 4 MG/ML
INJECTION, SOLUTION INTRA-ARTICULAR; INTRALESIONAL; INTRAMUSCULAR; INTRAVENOUS; SOFT TISSUE
Status: COMPLETED | OUTPATIENT
Start: 2021-03-01 | End: 2021-03-01

## 2021-03-01 RX ORDER — DIPHENHYDRAMINE HCL 25 MG
25 CAPSULE ORAL
Status: DISCONTINUED | OUTPATIENT
Start: 2021-03-01 | End: 2021-03-03 | Stop reason: HOSPADM

## 2021-03-01 RX ORDER — MELOXICAM 7.5 MG/1
7.5 TABLET ORAL 2 TIMES DAILY
Qty: 60 TAB | Refills: 2 | Status: SHIPPED | OUTPATIENT
Start: 2021-03-01 | End: 2021-03-11 | Stop reason: ALTCHOICE

## 2021-03-01 RX ORDER — CELECOXIB 200 MG/1
200 CAPSULE ORAL ONCE
Status: COMPLETED | OUTPATIENT
Start: 2021-03-01 | End: 2021-03-01

## 2021-03-01 RX ORDER — DEXAMETHASONE SODIUM PHOSPHATE 100 MG/10ML
10 INJECTION INTRAMUSCULAR; INTRAVENOUS ONCE
Status: COMPLETED | OUTPATIENT
Start: 2021-03-02 | End: 2021-03-02

## 2021-03-01 RX ORDER — ASCORBIC ACID 500 MG
500 TABLET ORAL DAILY
Status: DISCONTINUED | OUTPATIENT
Start: 2021-03-02 | End: 2021-03-03 | Stop reason: HOSPADM

## 2021-03-01 RX ORDER — ONDANSETRON 8 MG/1
8 TABLET, ORALLY DISINTEGRATING ORAL
Status: DISCONTINUED | OUTPATIENT
Start: 2021-03-01 | End: 2021-03-03 | Stop reason: HOSPADM

## 2021-03-01 RX ORDER — MELOXICAM 7.5 MG/1
7.5 TABLET ORAL 2 TIMES DAILY
Status: DISCONTINUED | OUTPATIENT
Start: 2021-03-02 | End: 2021-03-03 | Stop reason: HOSPADM

## 2021-03-01 RX ORDER — ACETAMINOPHEN 500 MG
1000 TABLET ORAL EVERY 6 HOURS
Status: DISCONTINUED | OUTPATIENT
Start: 2021-03-01 | End: 2021-03-03 | Stop reason: HOSPADM

## 2021-03-01 RX ORDER — PROPOFOL 10 MG/ML
INJECTION, EMULSION INTRAVENOUS
Status: DISCONTINUED | OUTPATIENT
Start: 2021-03-01 | End: 2021-03-01 | Stop reason: HOSPADM

## 2021-03-01 RX ORDER — LORATADINE 10 MG/1
10 TABLET ORAL DAILY
Status: DISCONTINUED | OUTPATIENT
Start: 2021-03-01 | End: 2021-03-01 | Stop reason: SDUPTHER

## 2021-03-01 RX ORDER — SODIUM CHLORIDE 0.9 % (FLUSH) 0.9 %
5-40 SYRINGE (ML) INJECTION AS NEEDED
Status: DISCONTINUED | OUTPATIENT
Start: 2021-03-01 | End: 2021-03-03 | Stop reason: HOSPADM

## 2021-03-01 RX ORDER — TRANEXAMIC ACID 100 MG/ML
INJECTION, SOLUTION INTRAVENOUS AS NEEDED
Status: DISCONTINUED | OUTPATIENT
Start: 2021-03-01 | End: 2021-03-01 | Stop reason: HOSPADM

## 2021-03-01 RX ORDER — HYDROMORPHONE HYDROCHLORIDE 2 MG/ML
0.5 INJECTION, SOLUTION INTRAMUSCULAR; INTRAVENOUS; SUBCUTANEOUS
Status: DISCONTINUED | OUTPATIENT
Start: 2021-03-01 | End: 2021-03-01 | Stop reason: HOSPADM

## 2021-03-01 RX ORDER — OXYCODONE HCL 10 MG/1
10 TABLET, FILM COATED, EXTENDED RELEASE ORAL EVERY 12 HOURS
Status: DISCONTINUED | OUTPATIENT
Start: 2021-03-01 | End: 2021-03-03 | Stop reason: HOSPADM

## 2021-03-01 RX ORDER — OXYCODONE HYDROCHLORIDE 5 MG/1
5 TABLET ORAL
Status: DISCONTINUED | OUTPATIENT
Start: 2021-03-01 | End: 2021-03-01 | Stop reason: HOSPADM

## 2021-03-01 RX ORDER — SODIUM CHLORIDE 0.9 % (FLUSH) 0.9 %
5-40 SYRINGE (ML) INJECTION EVERY 8 HOURS
Status: DISCONTINUED | OUTPATIENT
Start: 2021-03-01 | End: 2021-03-03 | Stop reason: HOSPADM

## 2021-03-01 RX ORDER — ONDANSETRON 8 MG/1
8 TABLET, ORALLY DISINTEGRATING ORAL
Qty: 30 TAB | Refills: 0 | Status: SHIPPED | OUTPATIENT
Start: 2021-03-01 | End: 2021-11-30

## 2021-03-01 RX ORDER — ASPIRIN 81 MG/1
81 TABLET ORAL 2 TIMES DAILY
Qty: 60 TAB | Refills: 0 | Status: SHIPPED | OUTPATIENT
Start: 2021-03-01 | End: 2021-09-27

## 2021-03-01 RX ORDER — KETOROLAC TROMETHAMINE 15 MG/ML
15 INJECTION, SOLUTION INTRAMUSCULAR; INTRAVENOUS EVERY 8 HOURS
Status: COMPLETED | OUTPATIENT
Start: 2021-03-01 | End: 2021-03-02

## 2021-03-01 RX ORDER — LIDOCAINE HYDROCHLORIDE 20 MG/ML
INJECTION, SOLUTION EPIDURAL; INFILTRATION; INTRACAUDAL; PERINEURAL AS NEEDED
Status: DISCONTINUED | OUTPATIENT
Start: 2021-03-01 | End: 2021-03-01 | Stop reason: HOSPADM

## 2021-03-01 RX ORDER — HYDROMORPHONE HYDROCHLORIDE 2 MG/1
2 TABLET ORAL
Status: DISCONTINUED | OUTPATIENT
Start: 2021-03-01 | End: 2021-03-03 | Stop reason: HOSPADM

## 2021-03-01 RX ORDER — NALOXONE HYDROCHLORIDE 0.4 MG/ML
.2-.4 INJECTION, SOLUTION INTRAMUSCULAR; INTRAVENOUS; SUBCUTANEOUS
Status: DISCONTINUED | OUTPATIENT
Start: 2021-03-01 | End: 2021-03-03 | Stop reason: HOSPADM

## 2021-03-01 RX ADMIN — SODIUM CHLORIDE, SODIUM LACTATE, POTASSIUM CHLORIDE, AND CALCIUM CHLORIDE 100 ML/HR: 600; 310; 30; 20 INJECTION, SOLUTION INTRAVENOUS at 07:22

## 2021-03-01 RX ADMIN — Medication 1 AMPULE: at 21:19

## 2021-03-01 RX ADMIN — ONDANSETRON 4 MG: 2 INJECTION INTRAMUSCULAR; INTRAVENOUS at 10:16

## 2021-03-01 RX ADMIN — OXYCODONE HYDROCHLORIDE 10 MG: 10 TABLET, FILM COATED, EXTENDED RELEASE ORAL at 12:20

## 2021-03-01 RX ADMIN — DEXAMETHASONE SODIUM PHOSPHATE 10 MG: 4 INJECTION, SOLUTION INTRAMUSCULAR; INTRAVENOUS at 09:11

## 2021-03-01 RX ADMIN — CEFAZOLIN SODIUM 2 G: 100 INJECTION, POWDER, LYOPHILIZED, FOR SOLUTION INTRAVENOUS at 16:24

## 2021-03-01 RX ADMIN — ROPIVACAINE HYDROCHLORIDE 40 MG: 2 INJECTION, SOLUTION EPIDURAL; INFILTRATION at 08:36

## 2021-03-01 RX ADMIN — CEFAZOLIN 2 G: 1 INJECTION, POWDER, FOR SOLUTION INTRAMUSCULAR; INTRAVENOUS; PARENTERAL at 08:56

## 2021-03-01 RX ADMIN — ACETAMINOPHEN 1000 MG: 500 TABLET, FILM COATED ORAL at 12:19

## 2021-03-01 RX ADMIN — Medication 3 AMPULE: at 07:23

## 2021-03-01 RX ADMIN — TRANEXAMIC ACID 1300 MG: 650 TABLET ORAL at 13:07

## 2021-03-01 RX ADMIN — ACETAMINOPHEN 1000 MG: 500 TABLET, FILM COATED ORAL at 18:36

## 2021-03-01 RX ADMIN — TRANEXAMIC ACID 1000 MG: 100 INJECTION, SOLUTION INTRAVENOUS at 09:06

## 2021-03-01 RX ADMIN — CELECOXIB 200 MG: 200 CAPSULE ORAL at 07:23

## 2021-03-01 RX ADMIN — Medication 81 MG: at 21:19

## 2021-03-01 RX ADMIN — ACETAMINOPHEN 1000 MG: 500 TABLET ORAL at 07:23

## 2021-03-01 RX ADMIN — CEFAZOLIN 2 G: 1 INJECTION, POWDER, FOR SOLUTION INTRAVENOUS at 09:03

## 2021-03-01 RX ADMIN — KETOROLAC TROMETHAMINE 15 MG: 30 INJECTION, SOLUTION INTRAMUSCULAR at 10:16

## 2021-03-01 RX ADMIN — SODIUM CHLORIDE, SODIUM LACTATE, POTASSIUM CHLORIDE, AND CALCIUM CHLORIDE: 600; 310; 30; 20 INJECTION, SOLUTION INTRAVENOUS at 09:22

## 2021-03-01 RX ADMIN — PHENYLEPHRINE HYDROCHLORIDE 100 MCG: 10 INJECTION INTRAVENOUS at 09:18

## 2021-03-01 RX ADMIN — OXYCODONE HYDROCHLORIDE 10 MG: 10 TABLET, FILM COATED, EXTENDED RELEASE ORAL at 21:19

## 2021-03-01 RX ADMIN — MEPIVACAINE HYDROCHLORIDE 3 ML: 20 INJECTION, SOLUTION EPIDURAL; INFILTRATION at 09:08

## 2021-03-01 RX ADMIN — LIDOCAINE HYDROCHLORIDE 100 MG: 20 INJECTION, SOLUTION EPIDURAL; INFILTRATION; INTRACAUDAL; PERINEURAL at 09:11

## 2021-03-01 RX ADMIN — Medication 10 ML: at 16:25

## 2021-03-01 RX ADMIN — MIDAZOLAM 2 MG: 1 INJECTION INTRAMUSCULAR; INTRAVENOUS at 08:34

## 2021-03-01 RX ADMIN — TRANEXAMIC ACID 1000 MG: 100 INJECTION, SOLUTION INTRAVENOUS at 10:14

## 2021-03-01 RX ADMIN — Medication 1 AMPULE: at 12:19

## 2021-03-01 RX ADMIN — FINASTERIDE 5 MG: 5 TABLET, FILM COATED ORAL at 21:24

## 2021-03-01 RX ADMIN — PHENYLEPHRINE HYDROCHLORIDE 100 MCG: 10 INJECTION INTRAVENOUS at 09:24

## 2021-03-01 RX ADMIN — GLYCOPYRROLATE 0.2 MG: 0.2 INJECTION, SOLUTION INTRAMUSCULAR; INTRAVENOUS at 09:28

## 2021-03-01 RX ADMIN — DEXAMETHASONE SODIUM PHOSPHATE 5 MG: 4 INJECTION, SOLUTION INTRAMUSCULAR; INTRAVENOUS at 08:36

## 2021-03-01 RX ADMIN — FENTANYL CITRATE 50 MCG: 50 INJECTION, SOLUTION INTRAMUSCULAR; INTRAVENOUS at 08:37

## 2021-03-01 RX ADMIN — KETOROLAC TROMETHAMINE 15 MG: 15 INJECTION, SOLUTION INTRAMUSCULAR; INTRAVENOUS at 21:24

## 2021-03-01 RX ADMIN — PHENYLEPHRINE HYDROCHLORIDE 50 MCG: 10 INJECTION INTRAVENOUS at 09:39

## 2021-03-01 RX ADMIN — PROPOFOL 100 MCG/KG/MIN: 10 INJECTION, EMULSION INTRAVENOUS at 09:11

## 2021-03-01 RX ADMIN — HYDROMORPHONE HYDROCHLORIDE 2 MG: 2 TABLET ORAL at 18:35

## 2021-03-01 RX ADMIN — TRANEXAMIC ACID 1300 MG: 650 TABLET ORAL at 16:25

## 2021-03-01 RX ADMIN — Medication 10 ML: at 21:28

## 2021-03-01 RX ADMIN — SODIUM CHLORIDE, SODIUM LACTATE, POTASSIUM CHLORIDE, AND CALCIUM CHLORIDE: 600; 310; 30; 20 INJECTION, SOLUTION INTRAVENOUS at 08:54

## 2021-03-01 RX ADMIN — PHENYLEPHRINE HYDROCHLORIDE 100 MCG: 10 INJECTION INTRAVENOUS at 09:51

## 2021-03-01 RX ADMIN — GLYCOPYRROLATE 0.2 MG: 0.2 INJECTION, SOLUTION INTRAMUSCULAR; INTRAVENOUS at 10:15

## 2021-03-01 RX ADMIN — GABAPENTIN 300 MG: 300 CAPSULE ORAL at 16:25

## 2021-03-01 RX ADMIN — PHENYLEPHRINE HYDROCHLORIDE 100 MCG: 10 INJECTION INTRAVENOUS at 09:30

## 2021-03-01 RX ADMIN — PHENYLEPHRINE HYDROCHLORIDE 100 MCG: 10 INJECTION INTRAVENOUS at 10:02

## 2021-03-01 RX ADMIN — GABAPENTIN 300 MG: 300 CAPSULE ORAL at 21:24

## 2021-03-01 RX ADMIN — KETOROLAC TROMETHAMINE 15 MG: 15 INJECTION, SOLUTION INTRAMUSCULAR; INTRAVENOUS at 14:58

## 2021-03-01 RX ADMIN — SODIUM CHLORIDE 100 ML/HR: 900 INJECTION, SOLUTION INTRAVENOUS at 12:18

## 2021-03-01 NOTE — ANESTHESIA PREPROCEDURE EVALUATION
Relevant Problems   No relevant active problems       Anesthetic History               Review of Systems / Medical History  Patient summary reviewed and pertinent labs reviewed    Pulmonary        Sleep apnea: No treatment  Shortness of breath (Occ with exertion. No changes in years. )         Neuro/Psych   Within defined limits           Cardiovascular                  Exercise tolerance: >4 METS  Comments: Denies CP or changes in functional status   GI/Hepatic/Renal  Within defined limits              Endo/Other        Obesity and arthritis     Other Findings              Physical Exam    Airway  Mallampati: II  TM Distance: 4 - 6 cm  Neck ROM: normal range of motion   Mouth opening: Normal     Cardiovascular    Rhythm: regular  Rate: normal         Dental    Dentition: Caps/crowns     Pulmonary  Breath sounds clear to auscultation               Abdominal  GI exam deferred       Other Findings            Anesthetic Plan    ASA: 2  Anesthesia type: spinal      Post-op pain plan if not by surgeon: peripheral nerve block single    Induction: Intravenous  Anesthetic plan and risks discussed with: Patient

## 2021-03-01 NOTE — PROGRESS NOTES
Care Management Interventions  PCP Verified by CM: Yes  Mode of Transport at Discharge: Self  Transition of Care Consult (CM Consult): [address]: Yes  Discharge Durable Medical Equipment: Yes(Rolling walker)  Physical Therapy Consult: Yes  Occupational Therapy Consult: Yes  Current Support Network: Lives with Spouse, Own Home  Confirm Follow Up Transport: Family  The Plan for Transition of Care is Related to the Following Treatment Goals : Return to independent function. The Patient and/or Patient Representative was Provided with a Choice of Provider and Agrees with the Discharge Plan?: Yes  Freedom of Choice List was Provided with Basic Dialogue that Supports the Patient's Individualized Plan of Care/Goals, Treatment Preferences and Shares the Quality Data Associated with the Providers?: Yes  Discharge Location  Discharge Placement: Home with home health    Patient is a 65y.o. year old male admitted for Right TKA . Patient plans to return home on discharge. Order received to arrange home health. Patient without preference towards agency. Referral sent to [facility]. Patient requesting we arrange a walker. Referral sent to [address] [place] [address] Str. delivered to the hospital room prior to discharge. Will follow until discharge. D/c address: [address], [place], [address].

## 2021-03-01 NOTE — H&P
[facility]  Pre Operative History and Physical Exam    Patient ID:  Karon Mccracken  [id number]  89 y.o.  1950    Today: March 1, 2021          CC:  Right  Knee pain    HPI:   The patient has end stage arthritis of the right knee. The patient was evaluated and examined during a consultation prior to today. The patient complains of knee pain with activities, reports pain as mostly occurring along the joint lines, reports stiffness of the knee with prolonged inactivity, and swelling/pain at the end of the day and after increased physical activity. The pain affects the patients activities of daily living and quality of life. The patient has attempted and exhausted conservative treatment. There have been no changes to the patient's orthopedic condition since the last office visit. Past Medical History:  Past Medical History:   Diagnosis Date    Allergies     environmental    Arthritis     osteo    Calculus of kidney     Cataracts, bilateral     Headache     History of elevated PSA     BPH. stable w/meds    Sleep apnea     sleep study performed, pt could not tolerate Cpap    Trigeminal neuralgia 2014    right side of face/head       Past Surgical History:  Past Surgical History:   Procedure Laterality Date    HX CATARACT REMOVAL Bilateral     HX COLONOSCOPY      HX HEENT      HX ORTHOPAEDIC Right 2012, 2014    meniscus repair     HX PROSTATE SURGERY  2015    prostate biopsy     HX TONSIL AND ADENOIDECTOMY          Medications:     Prior to Admission medications    Medication Sig Start Date End Date Taking? Authorizing Provider   ascorbic acid, vitamin C, (Vitamin C) 500 mg tablet Take 500 mg by mouth daily. Yes Provider, Historical   zinc 50 mg tab tablet Take 50 mg by mouth daily. Yes Provider, Historical   gabapentin (NEURONTIN) 300 mg capsule Take 1 Cap by mouth three (3) times daily. 11/6/20  Yes Francy Saucedo,    meloxicam (MOBIC) 7.5 mg tablet Take  by mouth daily. Yes Provider, Historical   aspirin delayed-release 81 mg tablet Take 81 mg by mouth every other day. At bedtime   Yes Provider, Historical   multivitamin (ONE A DAY) tablet Take 1 Tab by mouth daily. Yes Provider, Historical   loratadine (CLARITIN) 10 mg tablet Take 1 Tab by mouth daily. 10/9/20  Yes Skyeestzen Rand R, DO   tamsulosin (FLOMAX) 0.4 mg capsule TAKE 1 CAPSULE BY MOUTH ONCE DAILY AT BEDTIME FOR 90 DAYS 10/9/20  Yes Shannon Murry DO   finasteride (PROSCAR) 5 mg tablet TAKE 1 TABLET BY MOUTH ONCE DAILY  Patient taking differently: nightly. TAKE 1 TABLET BY MOUTH ONCE DAILY 10/9/20  Yes Celesta Giorgi R, DO   sildenafil citrate (VIAGRA) 50 mg tablet Take 1 Tab by mouth as needed for Erectile Dysfunction. 11/6/20   Shannon Murry DO       Family History:     Family History   Problem Relation Age of Onset    Heart Disease Mother         CAD, CABG     Cancer Mother [de-identified]        colon     Diabetes Mother     Elevated Lipids Mother     Kidney Disease Mother     Heart Failure Mother     Diabetes Father     Glaucoma Father     Cancer Sister         liver     Diabetes Brother     Psychiatric Disorder Brother     Suicide Brother     Downs Syndrome Brother     Heart Disease Maternal Grandmother 79        MI     Heart Disease Maternal Grandfather 79        MI     Stroke Paternal Grandmother     Heart Disease Paternal Grandfather 48        MI        Social History:      Social History     Tobacco Use    Smoking status: Former Smoker     Years: 0.50    Smokeless tobacco: Never Used    Tobacco comment: former occasinal cigar use; last use 10 yrs    Substance Use Topics    Alcohol use:  Yes     Alcohol/week: 4.0 standard drinks     Types: 2 Glasses of wine, 2 Cans of beer per week     Comment: occassionally         Allergies:    No Known Allergies     Vitals:     Visit Vitals  BP (!) 154/84 (BP 1 Location: Left upper arm, BP Patient Position: At rest;Sitting)   Pulse 72   Temp 98.2 °F (36.8 °C) Resp 20   Ht 5' 8\" (1.727 m)   Wt 234 lb 6.4 oz (106.3 kg)   SpO2 94%   BMI 35.64 kg/m²         Objective:         General: No Acute distress                   HEENT: Normocephalic/atramatic                   Lungs:  Breathing non-labored                   Heart:   RRR                    Abdomen: soft       Extremities:  Prior exam done in office has been consistent with end-stage knee arthritis. We have noted pain with ROM of the right knee. Trace effusion. Crepitus present. Distally the patient shows no neurologic deficit. Antalgic gait appreciated. Meds:   Current Facility-Administered Medications   Medication Dose Route Frequency    alcohol 62% (NOZIN) nasal  3 Ampule  3 Ampule Topical ONCE    ceFAZolin (ANCEF) 2 g/20 mL in sterile water IV syringe  2 g IntraVENous ONCE    lidocaine (XYLOCAINE) 10 mg/mL (1 %) injection 0.1 mL  0.1 mL SubCUTAneous PRN    lactated Ringers infusion  100 mL/hr IntraVENous CONTINUOUS    acetaminophen (TYLENOL) tablet 1,000 mg  1,000 mg Oral ONCE    celecoxib (CELEBREX) capsule 200 mg  200 mg Oral ONCE    fentaNYL citrate (PF) injection 100 mcg  100 mcg IntraVENous ONCE    midazolam (VERSED) injection 2 mg  2 mg IntraVENous ONCE       Patient Active Problem List   Diagnosis Code    Trigeminal neuralgia G50.0    Gout M10.9    Erectile dysfunction N52.9    Benign prostatic hyperplasia with lower urinary tract symptoms N40.1    Pure hypercholesterolemia E78.00    Osteoarthritis of right knee M17.11       Assessment:   1. Arthritis of the Right knee    Plan:   The patient has failed previous conservative treatment for this condition including anti-inflammatories, injections and lifestyle modifications. The necessity for joint replacement is present.  Risks, benefits, alternatives and possible complications of right knee arthroplasty have been discussed with the patient including but not limited to potential for infection, bleeding, damage to nerves and/or blood vessels, stiffness of the knee after surgery, knee instability after surgery, patellar maltracking, potential for fracture both intra-op and post-op, polyethylene wear, implant loosening, and risk for revision surgery secondary to but not limited to these discussed risks. Further, we have discussed potential for venous thrombo-embolism including deep vein thrombosis and pulmonary embolism despite being on prophylaxis. Additionally, we have discussed potential for continued pain after surgery and patient has voiced understanding that I can make no guarantees regarding the pain relief of outcomes after surgery. We have also previously discussed the potential of morbidity and mortality associated with, but not limited to, the actual surgical procedure, anesthesia, prior medical conditions, and/or the administration of medications perioperatively. The patient has been given the opportunity to ask questions all of which have been answered and the patient wishes to proceed. The patient will be admitted the day of surgery for right total knee replacement. The patient was counseled at length about the risks of nadeen Covid-19 during their perioperative period and any recovery window from their procedure. The patient was made aware that nadeen Covid-19  may worsen their prognosis for recovering from their procedure and lend to a higher morbidity and/or mortality risk. All material risks, benefits, and reasonable alternatives including postponing the procedure were discussed. The patient does  wish to proceed with the procedure at this time.         Signed By: Tae Stafford MD  March 1, 2021

## 2021-03-01 NOTE — PERIOP NOTES
Teach back method used in review of Hibiclens usage preop/postop, TB screening, pain management goals, falls precautions and use of Nozin for prevention of staph infections. Incentive spirometer reviewed and located in pt's belongings.

## 2021-03-01 NOTE — PERIOP NOTES
TRANSFER - OUT REPORT:    Verbal report given to MANJEET APONTE RN(name) on Mary Alvarado  being transferred to PRE-OP(unit) for routine progression of care       Report consisted of patients Situation, Background, Assessment and   Recommendations(SBAR). Information from the following report(s) SBAR, MAR and Recent Results was reviewed with the receiving nurse. Lines:   Peripheral IV 03/01/21 Right Hand (Active)   Site Assessment Clean, dry, & intact 03/01/21 0721   Phlebitis Assessment 0 03/01/21 0721   Infiltration Assessment 0 03/01/21 0721   Dressing Status Clean, dry, & intact 03/01/21 0721   Dressing Type Tape;Transparent 03/01/21 0721   Hub Color/Line Status Infusing;Pink 03/01/21 0721   Action Taken Blood drawn 03/01/21 3598        Opportunity for questions and clarification was provided.       Patient transported with:   [employer]

## 2021-03-01 NOTE — PROGRESS NOTES
Problem: Mobility Impaired (Adult and Pediatric)  Goal: *Acute Goals and Plan of Care (Insert Text)  Description: GOALS (1-4 days):  (1.)Mr. Irizarry will move from supine to sit and sit to supine  in bed with SUPERVISION. (2.)Mr. Irizarry will transfer from bed to chair and chair to bed with STAND BY ASSIST using the least restrictive device. (3.)Mr. Irizarry will ambulate with STAND BY ASSIST for 300 feet with the least restrictive device. (4.)Mr. Irizarry will ambulate up/down 3 steps with bilateral  railing with CONTACT GUARD ASSIST with no device. (5.)Mr. Irizarry will increase right knee ROM to 5°-80°.  ________________________________________________________________________________________________    Outcome: Progressing Towards Goal     PHYSICAL THERAPY JOINT CAMP TKA: Initial Assessment, Treatment Day: Day of Assessment, and PM 3/1/2021  INPATIENT: Hospital Day: 1  [place] / Product Type: Medicare /      NAME/AGE/GENDER: Orly Augustine is a 65 y.o. male   PRIMARY DIAGNOSIS:  Acquired deformity of right knee [M21.961]  Primary localized osteoarthritis of right knee [M17.11]   Procedure(s) and Anesthesia Type:     * RIGHT KNEE ARTHROPLASTY TOTAL ROBOTIC ASSISTED/ ITZEL/ JOSHUA - Spinal (Right)  ICD-10: Treatment Diagnosis:    Pain in Right Knee (M25.561)  Stiffness of Right Knee, Not elsewhere classified (M25.661)  Difficulty in walking, Not elsewhere classified (R26.2)      ASSESSMENT:     Mr. Supa Mcgee presents with decreased strength and range of motion right lower extremity and with decreased independence with functional mobility s/p right TKA. Pt will benefit from skilled PT interventions to maximize independence with functional mobility s/p right TKA. Pt did well with assessment and walked in room. In room in chair worked on TKA exercises with verbal cues. He stayed up in chair with ice on knee and needs in reach.  He plans to discharge to home from hospital with [address] for follow up. This section established at most recent assessment   PROBLEM LIST (Impairments causing functional limitations):  Decreased Strength  Decreased ADL/Functional Activities  Decreased Transfer Abilities  Decreased Ambulation Ability/Technique  Increased Pain  Decreased Flexibility/Joint Mobility  Edema/Girth  Decreased [place] with Home Exercise Program   INTERVENTIONS PLANNED: (Benefits and precautions of physical therapy have been discussed with the patient.)  Bed Mobility  Cold  Gait Training  Home Exercise Program (HEP)  Range of Motion (ROM)  Therapeutic Activites  Therapeutic Exercise/Strengthening  Transfer Training     TREATMENT PLAN: Frequency/Duration: Follow patient BID for duration of hospital stay to address above goals. Rehabilitation Potential For Stated Goals: Good     RECOMMENDED REHABILITATION/EQUIPMENT: (at time of discharge pending progress): Continue Skilled Therapy, Home Health: Physical Therapy, and Outpatient: Physical Therapy. HISTORY:   History of Present Injury/Illness (Reason for Referral):  Pt s/p right TKA on 3/1/2021  Past Medical History/Comorbidities:   Mr. Felipe Mason  has a past medical history of Allergies, Arthritis, Calculus of kidney, Cataracts, bilateral, Headache, History of elevated PSA, Sleep apnea, and Trigeminal neuralgia (2014). He also has no past medical history of Aneurysm ([address] Arrhythmia, Asthma, Autoimmune disease ([address] CAD (coronary artery disease), Cancer ([address] Chronic kidney disease, Chronic obstructive pulmonary disease ([address] Coagulation disorder ([address] Diabetes ([address] Difficult intubation, Endocarditis, GERD (gastroesophageal reflux disease), Heart failure ([address] Hypertension, Liver disease, Malignant hyperthermia due to anesthesia, Nausea & vomiting, Pseudocholinesterase deficiency, PUD (peptic ulcer disease), Rheumatic fever, Seizures ([address] Stroke ([address] Thromboembolus ([address] or Thyroid disease.   Mr. Felipe Mason  has a past surgical history that includes hx tonsil and adenoidectomy; hx prostate surgery (2015); hx heent; hx cataract removal (Bilateral); hx colonoscopy; and hx orthopaedic (Right, 2012, 2014). Social History/Living Environment:   Home Environment: Private residence  # Steps to Enter: 3  Rails to Enter: No  One/Two Story Residence: One story  Living Alone: No  Support Systems: Spouse/Significant Other/Partner  Patient Expects to be Discharged to[de-identified] Private residence  Current DME Used/Available at Home: Cane, straight, Crutches, Raised toilet seat, Walker, rolling  Tub or Shower Type: Shower  Prior Level of Function/Work/Activity:  Pt living at home, independent with mobility without assistive devices   Number of Personal Factors/Comorbidities that affect the Plan of Care: 0: LOW COMPLEXITY   EXAMINATION:   Most Recent Physical Functioning:   Gross Assessment: Yes  Gross Assessment  AROM: Within functional limits(except right lower extremity s/p TKA)  Strength: Within functional limits(except right lower extremity s/p TKA)                     Bed Mobility  Supine to Sit: Contact guard assistance  Scooting: Contact guard assistance    Transfers  Sit to Stand: Contact guard assistance  Stand to Sit: Contact guard assistance  Bed to Chair: Contact guard assistance    Balance  Sitting: Intact  Standing: Pull to stand; With support    Posture  Posture (WDL): Exceptions to WDL  Posture Assessment:  Forward head;Rounded shoulders         Weight Bearing Status  Right Side Weight Bearing: As tolerated  Distance (ft): 15 Feet (ft)(and another 15 feeet)  Ambulation - Level of Assistance: Minimal assistance  Assistive Device: Walker, rolling  Speed/Lizeth: Pace decreased (<100 feet/min)  Step Length: Left shortened  Stance: Right decreased  Gait Abnormalities: Antalgic;Decreased step clearance  Interventions: Safety awareness training;Verbal cues     Braces/Orthotics: none    Right Knee Cold  Type: Cryocuff  Patient Position: Sitting Body Structures Involved:  Muscles Body Functions Affected: Movement Related Activities and Participation Affected: Mobility  Self Care   Number of elements that affect the Plan of Care: 4+: HIGH COMPLEXITY   CLINICAL PRESENTATION:   Presentation: Stable and uncomplicated: LOW COMPLEXITY   CLINICAL DECISION MAKIN [facility] [address] [id number] AM-PAC 6 Clicks   Basic Mobility Inpatient Short Form  How much difficulty does the patient currently have. .. Unable A Lot A Little None   1. Turning over in bed (including adjusting bedclothes, sheets and blankets)? [] 1   [] 2   [x] 3   [] 4   2. Sitting down on and standing up from a chair with arms ( e.g., wheelchair, bedside commode, etc.)   [] 1   [] 2   [x] 3   [] 4   3. Moving from lying on back to sitting on the side of the bed? [] 1   [] 2   [x] 3   [] 4   How much help from another person does the patient currently need. .. Total A Lot A Little None   4. Moving to and from a bed to a chair (including a wheelchair)? [] 1   [] 2   [x] 3   [] 4   5. Need to walk in hospital room? [] 1   [] 2   [x] 3   [] 4   6. Climbing 3-5 steps with a railing? [] 1   [] 2   [x] 3   [] 4   © , Trustees of [address] [place] [address] [address] [id number], under license to [employer]. All rights reserved     Score:  Initial: 18 Most Recent: X (Date: -- )    Interpretation of Tool:  Represents activities that are increasingly more difficult (i.e. Bed mobility, Transfers, Gait). Medical Necessity:     Patient is expected to demonstrate progress in   strength, range of motion, and functional technique   to   decrease assistance required with functional mobility and TKA management independently  . Reason for Services/Other Comments:  Patient continues to require skilled intervention due to   Inability to complete functional mobility and TKA management independently  .    Use of outcome tool(s) and clinical judgement create a POC that gives a: Clear prediction of patient's progress: LOW COMPLEXITY TREATMENT:   (In addition to Assessment/Re-Assessment sessions the following treatments were rendered)     Pre-treatment Symptoms/Complaints:  none  Pain Initial:   Pain Intensity 1: 2  Post Session:  2/10   Assessment   Therapeutic Exercise: (10 Minutes):  Exercises per grid below to improve mobility and strength. Required minimal verbal and manual cues to promote proper body alignment and promote proper body posture. Progressed repetitions as indicated. Date:  3/1 Date:   Date:     ACTIVITY/EXERCISE AM PM AM PM AM PM   GROUP THERAPY  []  []  []  []  []  []   Ankle Pumps  10       Quad Sets  10       Gluteal Sets  10       Hip ABd/ADduction  10       Straight Leg Raises  10       Knee Slides  10       Short Arc Quads         Long Arc Quads         Chair Slides                  B = bilateral; AA = active assistive; A = active; P = passive      Treatment/Session Assessment:     Response to Treatment:  pt tolerated well. Education:  [x] Home Exercises  [x] Fall Precautions  [] Use of Cold Therapy Unit [] D/C Instruction Review  [] Knee Prosthesis Review  [] Walker Management/Safety [] Adaptive Equipment as Needed       Interdisciplinary Collaboration:   Occupational Therapist  Registered Nurse    After treatment position/precautions:   Up in chair  Bed/Chair-wheels locked  Call light within reach  Family at bedside    Compliance with Program/Exercises: Compliant all of the time, Will assess as treatment progresses. Recommendations/Intent for next treatment session:  Treatment next visit will focus on increasing Mr. Yu Rahman independence with bed mobility, transfers, gait training, strength/ROM exercises, modalities for pain, and patient education.       Total Treatment Duration:  PT Patient Time In/Time Out  Time In: 1400  Time Out: [address], PT

## 2021-03-01 NOTE — PROGRESS NOTES
Problem: Self Care Deficits Care Plan (Adult)  Goal: *Acute Goals and Plan of Care (Insert Text)  Description: GOALS:   DISCHARGE GOALS (in preparation for going home/rehab):  3 days  1. Mr. Branden Schwab will perform one lower body dressing activity with minimal assistance required to demonstrate improved functional mobility and safety. 2.  Mr. Branden Schwab will perform one lower body bathing activity with minimal assistance required to demonstrate improved functional mobility and safety. 3.  Mr. Branden Schwab will perform toileting/toilet transfer with contact guard assistance to demonstrate improved functional mobility and safety. 4.  Mr. Branden Schwab will perform shower transfer with contact guard assistance to demonstrate improved functional mobility and safety. JOINT CAMP OCCUPATIONAL THERAPY TKA: Initial Assessment and Daily Note 3/1/2021  INPATIENT: Hospital Day: 1  [place] / Product Type: Medicare /      NAME/AGE/GENDER: Martha George is a 65 y.o. male   PRIMARY DIAGNOSIS:  Acquired deformity of right knee [M21.961]  Primary localized osteoarthritis of right knee [M17.11]   Procedure(s) and Anesthesia Type:     * RIGHT KNEE ARTHROPLASTY TOTAL ROBOTIC ASSISTED/ ITZEL/ JOSHUA - Spinal (Right)  ICD-10: Treatment Diagnosis:    · Pain in Right Knee (M25.561)  · Stiffness of Right Knee, Not elsewhere classified (M25.661)      ASSESSMENT:     Mr. Branden Schwab is s/p Right TKA and presents with decreased weight bearing on R LE and decreased independence with functional mobility and activities of daily living as compared to baseline level of function and safety. Patient would benefit from skilled Occupational Therapy to maximize independence and safety with self-care task and functional mobility. Pt would also benefit from education on adaptive equipment and safety precautions in preparation for going home.       Patient able to don clothes at recliner with assist. Mobilized from bed to toilet to reclienr using a rolling walker with assist. Should progress well with ADL's tomorrow. This section established at most recent assessment   PROBLEM LIST (Impairments causing functional limitations):  1. Decreased Strength  2. Decreased ADL/Functional Activities  3. Decreased Transfer Abilities  4. Increased Pain  5. Increased Fatigue  6. Decreased Flexibility/Joint Mobility  7. Decreased Knowledge of Precautions   INTERVENTIONS PLANNED: (Benefits and precautions of occupational therapy have been discussed with the patient.)  1. Activities of daily living training  2. Adaptive equipment training  3. Balance training  4. Clothing management  5. Donning&doffing training  6. Theraputic activity     TREATMENT PLAN: Frequency/Duration: Follow patient 1-2tx to address above goals. Rehabilitation Potential For Stated Goals: Good     RECOMMENDED REHABILITATION/EQUIPMENT: (at time of discharge pending progress): Continue Skilled Therapy. OCCUPATIONAL PROFILE AND HISTORY:   History of Present Injury/Illness (Reason for Referral): Pt presents this date s/p (Right) TKA. Past Medical History/Comorbidities:   Mr. Liam Arevalo  has a past medical history of Allergies, Arthritis, Calculus of kidney, Cataracts, bilateral, Headache, History of elevated PSA, Sleep apnea, and Trigeminal neuralgia (2014). He also has no past medical history of Aneurysm ([address] Arrhythmia, Asthma, Autoimmune disease ([address] CAD (coronary artery disease), Cancer ([address] Chronic kidney disease, Chronic obstructive pulmonary disease ([address] Coagulation disorder ([address] Diabetes ([address] Difficult intubation, Endocarditis, GERD (gastroesophageal reflux disease), Heart failure ([address] Hypertension, Liver disease, Malignant hyperthermia due to anesthesia, Nausea & vomiting, Pseudocholinesterase deficiency, PUD (peptic ulcer disease), Rheumatic fever, Seizures ([address] Stroke ([address] Thromboembolus ([address] or Thyroid disease.   Mr. Liam Arevalo  has a past surgical history that includes hx tonsil and adenoidectomy; hx prostate surgery (2015); hx heent; hx cataract removal (Bilateral); hx colonoscopy; and hx orthopaedic (Right, 2012, 2014). Social History/Living Environment:   Home Environment: Private residence  # Steps to Enter: 3  One/Two Story Residence: One story  Living Alone: No  Support Systems: Spouse/Significant Other/Partner  Patient Expects to be Discharged to[de-identified] Private residence  Current DME Used/Available at Home: Cane, straight  Prior Level of Function/Work/Activity:  Independent prior. Number of Personal Factors/Comorbidities that affect the Plan of Care: Brief history (0):  LOW COMPLEXITY   ASSESSMENT OF OCCUPATIONAL PERFORMANCE[de-identified]   Most Recent Physical Functioning:   Balance  Sitting: Intact  Standing: With support                    Coordination  Fine Motor Skills-Upper: Left Intact; Right Intact  Gross Motor Skills-Upper: Left Intact; Right Intact         Mental Status  Neurologic State: Alert  Orientation Level: Oriented X4  Cognition: Appropriate decision making  Perception: Appears intact                Basic ADLs (From Assessment) Complex ADLs (From Assessment)   Basic ADL  Feeding: Independent  Oral Facial Hygiene/Grooming: Setup  Bathing: Minimum assistance  Upper Body Dressing: Setup  Lower Body Dressing: Moderate assistance  Toileting: Moderate assistance     Grooming/Bathing/Dressing Activities of Daily Living                       Functional Transfers  Bathroom Mobility: Minimum assistance  Toilet Transfer : Minimum assistance  Shower Transfer: Moderate assistance     Bed/Mat Mobility  Supine to Sit: Contact guard assistance  Sit to Stand: Contact guard assistance  Stand to Sit: Contact guard assistance  Bed to Chair: Contact guard assistance  Scooting: Contact guard assistance         Physical Skills Involved:  1. Range of Motion  2. Balance  3. Strength  4.  Activity Tolerance Cognitive Skills Affected (resulting in the inability to perform in a timely and safe manner):  1. [facility] Psychosocial Skills Affected:  1. WFL   Number of elements that affect the Plan of Care: 1-3:  LOW COMPLEXITY   CLINICAL DECISION MAKING:   [facility] AM-PAC 6 Clicks   Daily Activity Inpatient Short Form  How much help from another person does the patient currently need. .. Total A Lot A Little None   1. Putting on and taking off regular lower body clothing? [] 1   [x] 2   [] 3   [] 4   2. Bathing (including washing, rinsing, drying)? [] 1   [x] 2   [] 3   [] 4   3. Toileting, which includes using toilet, bedpan or urinal?   [] 1   [x] 2   [] 3   [] 4   4. Putting on and taking off regular upper body clothing? [] 1   [] 2   [] 3   [x] 4   5. Taking care of personal grooming such as brushing teeth? [] 1   [] 2   [] 3   [x] 4   6. Eating meals? [] 1   [] 2   [] 3   [x] 4   © 2007, Trustees of [facility], under license to [employer]. All rights reserved     Score:  Initial: 18 Most Recent: X (Date: -- )    Interpretation of Tool:  Represents activities that are increasingly more difficult (i.e. Bed mobility, Transfers, Gait). Medical Necessity:     · Skilled intervention continues to be required due to Deficits noted above. Reason for Services/Other Comments:  · Patient continues to require skilled intervention due to   · New TKA  · . Use of outcome tool(s) and clinical judgement create a POC that gives a: MODERATE COMPLEXITY            TREATMENT:   (In addition to Assessment/Re-Assessment sessions the following treatments were rendered)     Pre-treatment Symptoms/Complaints:    Pain: Initial:   Pain Intensity 1: 2  Pain Location 1: Knee  Pain Orientation 1: Right  Post Session:  2     Self Care: (10): Procedure(s) (per grid) utilized to improve and/or restore self-care/home management as related to dressing, toileting, and grooming.  Required minimal verbal and tactile cueing to facilitate activities of daily living skills. Initial evaluation 10 minutes. Treatment/Session Assessment:     Response to Treatment:  Good, sitting up in reclincer. Education:  [] Home Exercises  [x] Fall Precautions  [] Hip Precautions [] Going Home Video  [x] Knee/Hip Prosthesis Review  [x] Walker Management/Safety [x] Adaptive Equipment as Needed       Interdisciplinary Collaboration:   o Physical Therapist  o Occupational Therapist  o Registered Nurse    After treatment position/precautions:   o Up in chair  o Bed/Chair-wheels locked  o Caregiver at bedside  o Call light within reach  o RN notified     Compliance with Program/Exercises: Compliant all of the time, Will assess as treatment progresses. Recommendations/Intent for next treatment session:  Treatment next visit will focus on increasing Mr. Nimesh Fitch independence with bed mobility, transfers, self care, functional mobility, modalities for pain, and patient education.       Total Treatment Duration:  OT Patient Time In/Time Out  Time In: 1340  Time Out: [address] [place], [place]

## 2021-03-01 NOTE — ANESTHESIA PROCEDURE NOTES
Peripheral Block    Start time: 3/1/2021 8:35 AM  End time: 3/1/2021 8:36 AM  Performed by: Lorna Puentes MD  Authorized by: Lorna Puentes MD       Pre-procedure: Indications: at surgeon's request and post-op pain management    Preanesthetic Checklist: patient identified, risks and benefits discussed, site marked, timeout performed, anesthesia consent given and patient being monitored    Timeout Time: 08:35          Block Type:   Block Type:   Adductor canal  Laterality:  Right  Monitoring:  Standard ASA monitoring, responsive to questions, continuous pulse ox, oxygen, frequent vital sign checks and heart rate  Injection Technique:  Single shot  Procedures: nerve stimulator    Patient Position: supine  Prep: chlorhexidine    Location:  Mid thigh  Needle Type:  Stimuplex  Needle Gauge:  22 G  Needle Localization:  Ultrasound guidance  Medication Injected:  Ropivacaine (NAROPIN) 2 mg/mL (0.2 %) injection, 40 mg  dexamethasone (DECADRON) 4 mg/mL injection, 5 mg  Med Admin Time: 3/1/2021 8:36 AM    Assessment:  Number of attempts:  1  Injection Assessment:  Incremental injection every 5 mL, negative aspiration for CSF, ultrasound image on chart, no paresthesia, local visualized surrounding nerve on ultrasound, negative aspiration for blood and no intravascular symptoms  Patient tolerance:  Patient tolerated the procedure well with no immediate complications

## 2021-03-01 NOTE — PROGRESS NOTES
TRANSFER - IN REPORT:    Verbal report received from Jorden Mohs on Mary Alvarado  being received from PACU for routine post - op      Report consisted of patients Situation, Background, Assessment and   Recommendations(SBAR). Information from the following report(s) SBAR was reviewed with the receiving nurse. Opportunity for questions and clarification was provided. Assessment completed upon patients arrival to unit and care assumed.

## 2021-03-01 NOTE — ANESTHESIA PROCEDURE NOTES
Spinal Block    Start time: 3/1/2021 9:02 AM  End time: 3/1/2021 9:06 AM  Performed by: Arcenio Seals MD  Authorized by: Arcenio Seals MD     Pre-procedure:   Indications: at surgeon's request and primary anesthetic  Preanesthetic Checklist: patient identified, risks and benefits discussed, anesthesia consent, site marked, patient being monitored and timeout performed    Timeout Time: 09:02          Spinal Block:   Patient Position:  Seated  Prep Region:  Lumbar  Prep: chlorhexidine and patient draped      Location:  L3-4  Technique:  Single shot    Local Dose (mL):  3    Needle:   Needle Type:  Pencan  Needle Gauge:  25 G  Attempts:  1      Events: CSF confirmed, no blood with aspiration and no paresthesia        Assessment:  Insertion:  Uncomplicated  Patient tolerance:  Patient tolerated the procedure well with no immediate complications

## 2021-03-01 NOTE — ANESTHESIA POSTPROCEDURE EVALUATION
Procedure(s):  RIGHT KNEE ARTHROPLASTY TOTAL ROBOTIC ASSISTED/ ITZEL/ JOSHUA. spinal    Anesthesia Post Evaluation      Multimodal analgesia: multimodal analgesia used between 6 hours prior to anesthesia start to PACU discharge  Patient location during evaluation: bedside  Patient participation: complete - patient participated  Level of consciousness: awake and alert  Pain score: 1  Pain management: adequate  Airway patency: patent  Anesthetic complications: no  Cardiovascular status: acceptable  Respiratory status: acceptable  Hydration status: acceptable  Comments: Patient doing well. Continue care on floor.    Post anesthesia nausea and vomiting:  none  Final Post Anesthesia Temperature Assessment:  Normothermia (36.0-37.5 degrees C)      INITIAL Post-op Vital signs:   Vitals Value Taken Time   /73 03/01/21 1121   Temp 36.2 °C (97.2 °F) 03/01/21 1052   Pulse 69 03/01/21 1121   Resp 16 03/01/21 1121   SpO2 95 % 03/01/21 1121

## 2021-03-01 NOTE — PERIOP NOTES
Betadine lavage:  17.5cc of betadine lot # [id number]  , exp. Date 03/22 ,  in 500cc of . 9NS Lot #  [id number], exp.  Date : 04/01/2023

## 2021-03-01 NOTE — PROGRESS NOTES
Received pt to room 315. Pt is alert and drowsy. Wife at bedside. Oriented to room and bed controls.

## 2021-03-01 NOTE — PERIOP NOTES
TRANSFER - OUT REPORT:    Verbal report given to Madonna(name) on Savana Nicole  being transferred to [facility](unit) for routine post - op       Report consisted of patients Situation, Background, Assessment and   Recommendations(SBAR). Information from the following report(s) SBAR, OR Summary and MAR was reviewed with the receiving nurse. Lines:   Peripheral IV 03/01/21 Right Hand (Active)   Site Assessment Clean, dry, & intact 03/01/21 1058   Phlebitis Assessment 0 03/01/21 1058   Infiltration Assessment 0 03/01/21 0721   Dressing Status Clean, dry, & intact 03/01/21 1058   Dressing Type Transparent 03/01/21 1058   Hub Color/Line Status Pink 03/01/21 1058   Action Taken Blood drawn 03/01/21 4398        Opportunity for questions and clarification was provided.       Patient transported with:

## 2021-03-01 NOTE — OP NOTES
[address] [place] [address]  Total Knee Arthroplasty  Patient:Patrice Irizarry   : 1950  Medical Record Number:[id number]    Pre-operative Diagnosis: Acquired deformity of right knee [M21.961]  Primary localized osteoarthritis of right knee [M17.11]    Post-operative Diagnosis: Same    Location: [facility]    Date of Procedure: 3/1/2021    Surgeon: Derotha Primrose, MD    Assistant: Robel Rojo CFA and Pao Campos NP CFA    Anesthesia: Spinal + adductor nerve block    Procedure:  JOSHUA-Assisted Right Total Knee Arthroplasty    Tourniquet Time: Tourniquet Not Used    BMI: Body mass index is 35.64 kg/m². EBL: 970LW    Complications: None    Patient Condition upon Completion of Procedure: Stable    Implants:   Implant Name Type Inv. Item Serial No.  Lot No. LRB No. Used Action   COMPONENT FEM SZ 4 R KNEE CRUCE RET CEMENTLESS BEAD W/ KATARINA - [id number]  COMPONENT FEM SZ 4 R KNEE CRUCE RET CEMENTLESS BEAD W/ KATARINA  [employer] [id number] Right 1 Implanted   BASEPLATE TIB SZ 5 [id number] ML74MM KNEE TRITANIUM 4 CRUCFRM - [id number]  BASEPLATE TIB SZ 5 [id number] ML74MM KNEE TRITANIUM 4 CRUCFRM  [employer] [id number] Right 1 Implanted   INSERT TIB CS 5 10 MM ARTC KNEE [employer] TRIATHLON - [id number]  INSERT TIB CS 5 10 MM ARTC KNEE BEAR TECHNOLOGY TRIATHLON  [employer] [id number] Right 1 Implanted       Pre-Operative Plan/Implants:   - #4 Femoral Component   - #5 Tibial Component   - 9 mm Polyethylene Component    Intra-Operative Findings: Prior to bony resection we found that the patient's knee lacked approximately 15 degrees of extension. Also prior to bony resection we noted a varus coronal deformity. Intra-operatively we noted that the articular surfaces were arthritic with cartilage loss of both the medial and lateral compartments.  The patella was examined and found to be in good condition with minimal degenerative changes and was left unresurfaced. . With trial components in place we assessed knee motion and found that the knee was able to fully extend. In addition we felt we had achieved acceptable ligament balance between the medial and lateral side of the knee in both extension and flexion. Description of Procedure: The complexity of the total joint surgery requires the use of a first assistant for positioning, retraction and assistance in closure. Carlos Alberto Ndiaye had undergone adductor canal block in the preoperative holding area. Carlos Alberto Ndiaye was brought to the operating room, positioned on the operating room table, and after appropriate identification underwent Spinal anesthesia. IV antibiotics were administered per proticol. The right leg and was then prepped and draped in the usual sterile manner. Timeout was taken identifying the patient, procedure ,operative side and surgeon. Prior to incision one gram of IV transexamic acid was administered intravenously. An anterior longitudinal incision was made just medial to the tibial tubercle and extending proximal.  A subvastas capsular incision was performed. The medial capsular flap was elevated around to the midcoronal plane. The patella was subluxed laterally and patellar fat pat partially excised. The knee was flexed and externally rotated. The articular surface revealed cartilage loss with exposed bone and bone spurs throughout all three compartments. The anterior cruciate ligament was resected. We then placed both our femoral and tibial check points followed by the femoral and tibial array pins. The femoral arrays pins were placed intra-incisional whereas the tibial pins were placed extra-incisional approximately 4-5cm below the tibial tubercle. Both arrays were placed and were verified to be visible to the JOSHUA sensory array. We then proceeded with point acquisition of both the femur and tibia.  Finally, we captured stress views of the knee in extension and 90 degrees of flexion for our ligament balancing after medial and/or lateral osteophytes were removed. We then adjusted our planned femoral and tibial component placement parameters to obtain acceptable ligament balance while ensuring that we stayed within acceptable alignment criteria as well as resection depths/parameters for both the femur and tibia. Ultimately, we opted for a #4 femoral component, a #5 tibial component and a 9mm polyethylene component. Retractors were placed and we proceed with our bony preparation. We first addressed our distal femur and posterior chamfer cuts using the 90 degrees blade. We then performed our posterior condylar, anterior femoral, anterior chamfer, and proximal tibial cuts with the straight JOSHUA blade. Bony wedges were removed after these cuts as were any residual osteophytes. The PCL was assessed and felt to be intact and stable. We felt given this that we would be could proceed with a cruciate retaining implant. . Medial and lateral meniscus' were removed along with any redundant tissue. Any posterior osteophytes were removed. The posteromedial and posterolateral capsule as well as the medial and lateral periosteum of the distal femur and proximal tibia were injected with periarticular cocktail containing local anesthetic and toradol. Trial components were then placed. We then performed a trial of the knee with trial components in place. We felt that with a 10 mm polyethylene trial in place the knee had acceptable varus and valgus stability throughout arc of motion, was able to fully extend, was not too tight in flexion, and had acceptable stability with anterior  Drawer testing. No additional surgical releases were required. The patella was then everted. The patella was examined and found to be in good condition with minimal degenerative changes and was left unresurfaced. At this point the trial polyethylene component and trial femoral component were removed.  We again assessed our tibial tray and verified that we were satisfied with its position. We did not have to adjust its position. The proximal tibia was punched and drilled per protocol for the system. We irrigated and debrided all bony surfaces of residual tissue and bone. We then inserted the tibial and femoral components and noted them to be well seated. We then inserted our final polyethylene component which was a 10mm thick. Kavon Irizarry's knee was placed through a range of motion and noted to be stable as mentioned above with the trial components. In additional we checked patellar tracking one last time which was felt to be appropriate. A lavage of diluted betadine solution of 17.5 ml Betadine in 500 of 0.9% normal saline was allowed to soak in the wound for 3 minutes after implanting of the prosthesis. During the time of the Betadine soak we removed the previously placed femoral and tibial sensors and array pins and finished injection our periarticular cocktail. The wound was irrigated with normal saline again before closure. The capsular layer was closed using a combination of 0-Stratafix bidirectional barbed suture and #2 Fiberwire. After capsular closure one gram of topical  transexemic acid was injected into the capsule. The subcutaneous layer was closed using a 2-0 Monocril interrupted suture. The skin was closed using staples. A sterile dressing was applied. The sponge count and needle counts were correct. Patient was transferred to the hospital stretcher and transported to recovery in stable condition.     Signed By: Julio Henderson MD

## 2021-03-01 NOTE — PROGRESS NOTES
TRANSFER - IN REPORT:    Verbal report received from [address] (name) on [facility]  being received from 3rd ortho (unit) for ordered procedure      Report consisted of patients Situation, Background, Assessment and   Recommendations(SBAR). Information from the following report(s) SBAR, Kardex and MAR was reviewed with the receiving nurse. Opportunity for questions and clarification was provided. Assessment completed upon patients arrival to unit and care assumed.

## 2021-03-02 LAB — HGB BLD-MCNC: 13.6 G/DL (ref 13.6–17.2)

## 2021-03-02 PROCEDURE — 74011250637 HC RX REV CODE- 250/637: Performed by: PHYSICIAN ASSISTANT

## 2021-03-02 PROCEDURE — 97116 GAIT TRAINING THERAPY: CPT

## 2021-03-02 PROCEDURE — 74011250637 HC RX REV CODE- 250/637: Performed by: ORTHOPAEDIC SURGERY

## 2021-03-02 PROCEDURE — 85018 HEMOGLOBIN: CPT

## 2021-03-02 PROCEDURE — 74011000250 HC RX REV CODE- 250: Performed by: NURSE PRACTITIONER

## 2021-03-02 PROCEDURE — 97535 SELF CARE MNGMENT TRAINING: CPT

## 2021-03-02 PROCEDURE — 36415 COLL VENOUS BLD VENIPUNCTURE: CPT

## 2021-03-02 PROCEDURE — 97110 THERAPEUTIC EXERCISES: CPT

## 2021-03-02 PROCEDURE — 74011250636 HC RX REV CODE- 250/636: Performed by: NURSE PRACTITIONER

## 2021-03-02 PROCEDURE — 65270000029 HC RM PRIVATE

## 2021-03-02 PROCEDURE — 74011250637 HC RX REV CODE- 250/637: Performed by: NURSE PRACTITIONER

## 2021-03-02 RX ORDER — POLYETHYLENE GLYCOL 3350 17 G/17G
17 POWDER, FOR SOLUTION ORAL DAILY
Status: DISCONTINUED | OUTPATIENT
Start: 2021-03-02 | End: 2021-03-03 | Stop reason: HOSPADM

## 2021-03-02 RX ADMIN — Medication 1 AMPULE: at 21:10

## 2021-03-02 RX ADMIN — ACETAMINOPHEN 1000 MG: 500 TABLET, FILM COATED ORAL at 18:43

## 2021-03-02 RX ADMIN — KETOROLAC TROMETHAMINE 15 MG: 15 INJECTION, SOLUTION INTRAMUSCULAR; INTRAVENOUS at 05:55

## 2021-03-02 RX ADMIN — OXYCODONE HYDROCHLORIDE AND ACETAMINOPHEN 500 MG: 500 TABLET ORAL at 09:19

## 2021-03-02 RX ADMIN — POLYETHYLENE GLYCOL 3350 17 G: 17 POWDER, FOR SOLUTION ORAL at 22:37

## 2021-03-02 RX ADMIN — OXYCODONE HYDROCHLORIDE 10 MG: 10 TABLET, FILM COATED, EXTENDED RELEASE ORAL at 09:19

## 2021-03-02 RX ADMIN — Medication 10 ML: at 21:10

## 2021-03-02 RX ADMIN — Medication 81 MG: at 09:19

## 2021-03-02 RX ADMIN — HYDROMORPHONE HYDROCHLORIDE 2 MG: 2 TABLET ORAL at 17:40

## 2021-03-02 RX ADMIN — ACETAMINOPHEN 1000 MG: 500 TABLET, FILM COATED ORAL at 12:24

## 2021-03-02 RX ADMIN — LORATADINE 10 MG: 10 TABLET ORAL at 09:21

## 2021-03-02 RX ADMIN — Medication 1 AMPULE: at 09:18

## 2021-03-02 RX ADMIN — Medication 81 MG: at 21:10

## 2021-03-02 RX ADMIN — ACETAMINOPHEN 1000 MG: 500 TABLET, FILM COATED ORAL at 00:01

## 2021-03-02 RX ADMIN — MELOXICAM 7.5 MG: 7.5 TABLET ORAL at 17:40

## 2021-03-02 RX ADMIN — MELOXICAM 7.5 MG: 7.5 TABLET ORAL at 09:21

## 2021-03-02 RX ADMIN — GABAPENTIN 300 MG: 300 CAPSULE ORAL at 21:10

## 2021-03-02 RX ADMIN — DOCUSATE SODIUM 50MG AND SENNOSIDES 8.6MG 2 TABLET: 8.6; 5 TABLET, FILM COATED ORAL at 09:19

## 2021-03-02 RX ADMIN — GABAPENTIN 300 MG: 300 CAPSULE ORAL at 15:57

## 2021-03-02 RX ADMIN — GABAPENTIN 300 MG: 300 CAPSULE ORAL at 09:19

## 2021-03-02 RX ADMIN — DEXAMETHASONE SODIUM PHOSPHATE 10 MG: 10 INJECTION INTRAMUSCULAR; INTRAVENOUS at 12:24

## 2021-03-02 RX ADMIN — Medication 10 ML: at 05:54

## 2021-03-02 RX ADMIN — OXYCODONE HYDROCHLORIDE 10 MG: 10 TABLET, FILM COATED, EXTENDED RELEASE ORAL at 21:10

## 2021-03-02 RX ADMIN — HYDROMORPHONE HYDROCHLORIDE 2 MG: 2 TABLET ORAL at 03:52

## 2021-03-02 RX ADMIN — Medication 220 MG: at 09:20

## 2021-03-02 RX ADMIN — MULTIPLE VITAMINS W/ MINERALS TAB 1 TABLET: TAB at 09:20

## 2021-03-02 RX ADMIN — CEFAZOLIN SODIUM 2 G: 100 INJECTION, POWDER, LYOPHILIZED, FOR SOLUTION INTRAVENOUS at 00:01

## 2021-03-02 RX ADMIN — FINASTERIDE 5 MG: 5 TABLET, FILM COATED ORAL at 21:10

## 2021-03-02 NOTE — PROGRESS NOTES
Problem: Mobility Impaired (Adult and Pediatric)  Goal: *Acute Goals and Plan of Care (Insert Text)  Description: GOALS (1-4 days):  (1.)Mr. Irizarry will move from supine to sit and sit to supine  in bed with SUPERVISION. (2.)Mr. Irizarry will transfer from bed to chair and chair to bed with STAND BY ASSIST using the least restrictive device. (3.)Mr. Irizarry will ambulate with STAND BY ASSIST for 300 feet with the least restrictive device. (4.)Mr. Irizarry will ambulate up/down 3 steps with bilateral  railing with CONTACT GUARD ASSIST with no device. (5.)Mr. Irizarry will increase right knee ROM to 5°-80°.  ________________________________________________________________________________________________    Outcome: Progressing Towards Goal     PHYSICAL THERAPY JOINT CAMP TKA: Daily Note, Treatment Day: 1st and AM 3/2/2021  INPATIENT: Hospital Day: 2  [place] / [place] / Product Type: Medicare /      NAME/AGE/GENDER: Megan Bernabe is a 65 y.o. male   PRIMARY DIAGNOSIS:  Acquired deformity of right knee [M21.961]  Primary localized osteoarthritis of right knee [M17.11]   Procedure(s) and Anesthesia Type:     * RIGHT KNEE ARTHROPLASTY TOTAL ROBOTIC ASSISTED/ ITZEL/ JOSHUA - Spinal (Right)  ICD-10: Treatment Diagnosis:    · Pain in Right Knee (M25.561)  · Stiffness of Right Knee, Not elsewhere classified (M25.661)  · Difficulty in walking, Not elsewhere classified (R26.2)      ASSESSMENT:     Mr. Marleen Cowan presents with decreased strength and range of motion right lower extremity and with decreased independence with functional mobility s/p right TKA. Pt will benefit from skilled PT interventions to maximize independence with functional mobility s/p right TKA. Pt did well with assessment and walked in room. In room in chair worked on TKA exercises with verbal cues. He stayed up in chair with ice on knee and needs in reach. He plans to discharge to home from hospital with HHPT for follow up. 3/2- pt was up in chair on contact and agreeable to therapy. Worked on his TKA exercises with verbal cues progressing with repetitions. Reviewed HEP and use of ice along with frequency. Pt verbalizes and demonstrates understanding. Worked on gait training in the glover with verbal cues making great progress with distance. Doing well with reciprocal gait pattern and is steady with the walker. Practiced going up and down 3 steps with hand rail. Walked back to room and stayed up in chair with ice on knee and needs in reach. This section established at most recent assessment   PROBLEM LIST (Impairments causing functional limitations):  1. Decreased Strength  2. Decreased ADL/Functional Activities  3. Decreased Transfer Abilities  4. Decreased Ambulation Ability/Technique  5. Increased Pain  6. Decreased Flexibility/Joint Mobility  7. Edema/Girth  8. Decreased [place] with Home Exercise Program   INTERVENTIONS PLANNED: (Benefits and precautions of physical therapy have been discussed with the patient.)  1. Bed Mobility  2. Cold  3. Gait Training  4. Home Exercise Program (HEP)  5. Range of Motion (ROM)  6. Therapeutic Activites  7. Therapeutic Exercise/Strengthening  8. Transfer Training     TREATMENT PLAN: Frequency/Duration: Follow patient BID for duration of hospital stay to address above goals. Rehabilitation Potential For Stated Goals: Good     RECOMMENDED REHABILITATION/EQUIPMENT: (at time of discharge pending progress): Continue Skilled Therapy, Home Health: Physical Therapy, and Outpatient: Physical Therapy. HISTORY:   History of Present Injury/Illness (Reason for Referral):  Pt s/p right TKA on 3/1/2021  Past Medical History/Comorbidities:   Mr. Yaa Tirado  has a past medical history of Allergies, Arthritis, Calculus of kidney, Cataracts, bilateral, Headache, History of elevated PSA, Sleep apnea, and Trigeminal neuralgia (2014).  He also has no past medical history of Aneurysm ([facility] [address] Arrhythmia, Asthma, Autoimmune disease ([facility] [address] CAD (coronary artery disease), Cancer ([address] Chronic kidney disease, Chronic obstructive pulmonary disease ([address] Coagulation disorder ([address] Diabetes ([address] Difficult intubation, Endocarditis, GERD (gastroesophageal reflux disease), Heart failure ([address] Hypertension, Liver disease, Malignant hyperthermia due to anesthesia, Nausea & vomiting, Pseudocholinesterase deficiency, PUD (peptic ulcer disease), Rheumatic fever, Seizures ([address] Stroke ([address] Thromboembolus ([address] or Thyroid disease. Mr. Branden Schwab  has a past surgical history that includes hx tonsil and adenoidectomy; hx prostate surgery (2015); hx heent; hx cataract removal (Bilateral); hx colonoscopy; and hx orthopaedic (Right, 2012, 2014). Social History/Living Environment:   Home Environment: Private residence  # Steps to Enter: 3  Rails to Enter: No  One/Two Story Residence: One story  Living Alone: No  Support Systems: Spouse/Significant Other/Partner  Patient Expects to be Discharged to[de-identified] Private residence  Current DME Used/Available at Home: Linward Flick, straight, Crutches, Raised toilet seat, Walker, rolling  Tub or Shower Type: Shower  Prior Level of Function/Work/Activity:  Pt living at home, independent with mobility without assistive devices   Number of Personal Factors/Comorbidities that affect the Plan of Care: 0: LOW COMPLEXITY   EXAMINATION:   Most Recent Physical Functioning:               RLE AROM  R Knee Flexion: 115            Bed Mobility  Supine to Sit: Supervision  Scooting: Supervision    Transfers  Sit to Stand: Supervision  Stand to Sit: Supervision  Bed to Chair: Supervision    Balance  Sitting: Intact  Standing: Intact; With support         Gait Training: Yes    Weight Bearing Status  Right Side Weight Bearing: As tolerated  Distance (ft): 600 Feet (ft)  Ambulation - Level of Assistance: Supervision;Stand-by assistance  Assistive Device: Walker, rolling  Speed/Lizeth: Pace decreased (<100 feet/min)  Step Length: Left shortened  Stance: Right decreased  Gait Abnormalities: Antalgic;Decreased step clearance  Number of Stairs Trained: 3  Stairs - Level of Assistance: Stand-by assistance;Contact guard assistance  Rail Use: Both  Interventions: Safety awareness training;Verbal cues     Braces/Orthotics: none    Right Knee Cold  Type: Cryocuff  Patient Position: Sitting      Body Structures Involved:  1. Muscles Body Functions Affected:  1. Movement Related Activities and Participation Affected:  1. Mobility  2. Self Care   Number of elements that affect the Plan of Care: 4+: HIGH COMPLEXITY   CLINICAL PRESENTATION:   Presentation: Stable and uncomplicated: LOW COMPLEXITY   CLINICAL DECISION MAKIN[address] [place] [address] AM-PAC 6 Clicks   Basic Mobility Inpatient Short Form  How much difficulty does the patient currently have. .. Unable A Lot A Little None   1. Turning over in bed (including adjusting bedclothes, sheets and blankets)? [] 1   [] 2   [x] 3   [] 4   2. Sitting down on and standing up from a chair with arms ( e.g., wheelchair, bedside commode, etc.)   [] 1   [] 2   [x] 3   [] 4   3. Moving from lying on back to sitting on the side of the bed? [] 1   [] 2   [x] 3   [] 4   How much help from another person does the patient currently need. .. Total A Lot A Little None   4. Moving to and from a bed to a chair (including a wheelchair)? [] 1   [] 2   [x] 3   [] 4   5. Need to walk in hospital room? [] 1   [] 2   [x] 3   [] 4   6. Climbing 3-5 steps with a railing? [] 1   [] 2   [x] 3   [] 4   © , Trustees of [address] [place] [id number], under license to [employer]. All rights reserved     Score:  Initial: 18 Most Recent: X (Date: -- )    Interpretation of Tool:  Represents activities that are increasingly more difficult (i.e. Bed mobility, Transfers, Gait).     Medical Necessity:     · Patient is expected to demonstrate progress in   · strength, range of motion, and functional technique  · to   · decrease assistance required with functional mobility and TKA management independently  · .  Reason for Services/Other Comments:  · Patient continues to require skilled intervention due to   · Inability to complete functional mobility and TKA management independently  · . Use of outcome tool(s) and clinical judgement create a POC that gives a: Clear prediction of patient's progress: LOW COMPLEXITY            TREATMENT:   (In addition to Assessment/Re-Assessment sessions the following treatments were rendered)     Pre-treatment Symptoms/Complaints:  none  Pain Initial:   Pain Intensity 1: 4  Post Session:  4/10   Gait Training (25 Minutes):  Gait training to improve and/or restore physical functioning as related to mobility and strength. Ambulated 600 Feet (ft) with Supervision;Stand-by assistance using a Walker, rolling and minimal Safety awareness training;Verbal cues related to their stance phase and stride length to promote proper body alignment and promote proper body posture. Instruction in performance of walker use and gait sequencing to correct stance phase and stride length. Therapeutic Exercise: (21 Minutes):  Exercises per grid below to improve mobility and strength. Required minimal verbal and manual cues to promote proper body alignment and promote proper body posture. Progressed repetitions as indicated. Date:  3/1 Date:  3/2 Date:     ACTIVITY/EXERCISE AM PM AM PM AM PM   GROUP THERAPY  []  []  []  []  []  []   Ankle Pumps  10 15      Quad Sets  10 15      Gluteal Sets  10 15      Hip ABd/ADduction  10 15      Straight Leg Raises  10 15      Knee Slides  10 15      Short Arc Quads   15      Long Arc Quads         Chair Slides   15               B = bilateral; AA = active assistive; A = active; P = passive      Treatment/Session Assessment:     Response to Treatment:  pt tolerated well, progressing nicely.     Education:  [x] Home Exercises  [x] Fall Precautions  [x] Use of Cold Therapy Unit [x] D/C Instruction Review  [] Knee Prosthesis Review  [x] Walker Management/Safety [] Adaptive Equipment as Needed       Interdisciplinary Collaboration:   o Registered Nurse    After treatment position/precautions:   o Up in chair  o Bed/Chair-wheels locked  o Call light within reach    Compliance with Program/Exercises: Compliant all of the time. Recommendations/Intent for next treatment session:  Treatment next visit will focus on increasing Mr. Jt Mckinley independence with bed mobility, transfers, gait training, strength/ROM exercises, modalities for pain, and patient education.       Total Treatment Duration:  PT Patient Time In/Time Out  Time In: 0922  Time Out: [address] [facility]

## 2021-03-02 NOTE — PROGRESS NOTES
Problem: Mobility Impaired (Adult and Pediatric)  Goal: *Acute Goals and Plan of Care (Insert Text)  Description: GOALS (1-4 days):  (1.)Mr. Irizarry will move from supine to sit and sit to supine  in bed with SUPERVISION. (2.)Mr. Irizarry will transfer from bed to chair and chair to bed with STAND BY ASSIST using the least restrictive device. (3.)Mr. Irizarry will ambulate with STAND BY ASSIST for 300 feet with the least restrictive device. (4.)Mr. Irizarry will ambulate up/down 3 steps with bilateral  railing with CONTACT GUARD ASSIST with no device. (5.)Mr. Irizarry will increase right knee ROM to 5°-80°.  ________________________________________________________________________________________________    Outcome: Progressing Towards Goal     PHYSICAL THERAPY JOINT CAMP TKA: Daily Note, Treatment Day: 1st and PM 3/2/2021  INPATIENT: Hospital Day: 2  [place] / [place] / Product Type: Medicare /      NAME/AGE/GENDER: Wilbert Gunn is a 65 y.o. male   PRIMARY DIAGNOSIS:  Acquired deformity of right knee [M21.961]  Primary localized osteoarthritis of right knee [M17.11]   Procedure(s) and Anesthesia Type:     * RIGHT KNEE ARTHROPLASTY TOTAL ROBOTIC ASSISTED/ ITZEL/ JOSHUA - Spinal (Right)  ICD-10: Treatment Diagnosis:    · Pain in Right Knee (M25.561)  · Stiffness of Right Knee, Not elsewhere classified (M25.661)  · Difficulty in walking, Not elsewhere classified (R26.2)      ASSESSMENT:     Mr. Yrn Fischer presents with decreased strength and range of motion right lower extremity and with decreased independence with functional mobility s/p right TKA. Pt will benefit from skilled PT interventions to maximize independence with functional mobility s/p right TKA. Pt did well with assessment and walked in room. In room in chair worked on TKA exercises with verbal cues. He stayed up in chair with ice on knee and needs in reach. He plans to discharge to home from hospital with HHPT for follow up. 3/2- pt was up in chair on contact and agreeable to therapy. Worked on his TKA exercises with verbal cues progressing with repetitions. Reviewed HEP and use of ice along with frequency. Pt verbalizes and demonstrates understanding. Worked on gait training in the glover with verbal cues making great progress with distance. Doing well with reciprocal gait pattern and is steady with the walker. Practiced going up and down 3 steps with hand rail. Walked back to room and stayed up in chair with ice on knee and needs in reach. In the afternoon, pt up in chair on contact with spouse at bedside. He is ready for therapy. We worked on his TKA exercises in the chair with verbal cues. Reviewed HEP for spouse's benefit as well. Worked on gait training in the glover with verbal cues. Pt continues to do well with reciprocal gait pattern, he does state more pain this afternoon. In gym he wanted to go up and down the stairs again, practiced without rails and with cane in one hand and PT on the the other side. Walked back to room and he stayed up in chair with needs in reach and ice on knee. He states he wants to go home tomorrow. This section established at most recent assessment   PROBLEM LIST (Impairments causing functional limitations):  1. Decreased Strength  2. Decreased ADL/Functional Activities  3. Decreased Transfer Abilities  4. Decreased Ambulation Ability/Technique  5. Increased Pain  6. Decreased Flexibility/Joint Mobility  7. Edema/Girth  8. Decreased [place] with Home Exercise Program   INTERVENTIONS PLANNED: (Benefits and precautions of physical therapy have been discussed with the patient.)  1. Bed Mobility  2. Cold  3. Gait Training  4. Home Exercise Program (HEP)  5. Range of Motion (ROM)  6. Therapeutic Activites  7. Therapeutic Exercise/Strengthening  8. Transfer Training     TREATMENT PLAN: Frequency/Duration: Follow patient BID for duration of hospital stay to address above goals.    Rehabilitation Potential For Stated Goals: Good     RECOMMENDED REHABILITATION/EQUIPMENT: (at time of discharge pending progress): Continue Skilled Therapy, Home Health: Physical Therapy, and Outpatient: Physical Therapy. HISTORY:   History of Present Injury/Illness (Reason for Referral):  Pt s/p right TKA on 3/1/2021  Past Medical History/Comorbidities:   Mr. Branden Schwab  has a past medical history of Allergies, Arthritis, Calculus of kidney, Cataracts, bilateral, Headache, History of elevated PSA, Sleep apnea, and Trigeminal neuralgia (2014). He also has no past medical history of Aneurysm ([address] Arrhythmia, Asthma, Autoimmune disease ([address] CAD (coronary artery disease), Cancer ([address] Chronic kidney disease, Chronic obstructive pulmonary disease ([address] Coagulation disorder ([address] Diabetes ([address] Difficult intubation, Endocarditis, GERD (gastroesophageal reflux disease), Heart failure ([address] Hypertension, Liver disease, Malignant hyperthermia due to anesthesia, Nausea & vomiting, Pseudocholinesterase deficiency, PUD (peptic ulcer disease), Rheumatic fever, Seizures ([address] Stroke ([address] Thromboembolus ([address] or Thyroid disease. Mr. Branden Schwab  has a past surgical history that includes hx tonsil and adenoidectomy; hx prostate surgery (2015); hx heent; hx cataract removal (Bilateral); hx colonoscopy; and hx orthopaedic (Right, 2012, 2014).   Social History/Living Environment:   Home Environment: Private residence  # Steps to Enter: 3  Rails to Enter: No  One/Two Story Residence: One story  Living Alone: No  Support Systems: Spouse/Significant Other/Partner  Patient Expects to be Discharged to[de-identified] Private residence  Current DME Used/Available at Home: Cane, straight, [facility], Raised toilet seat, Walker, rolling  Tub or Shower Type: Shower  Prior Level of Function/Work/Activity:  Pt living at home, independent with mobility without assistive devices   Number of Personal Factors/Comorbidities that affect the Plan of Care: 0: LOW COMPLEXITY   EXAMINATION:   Most Recent Physical Functioning:               RLE AROM  R Knee Flexion: 115            Bed Mobility  Supine to Sit: Supervision  Scooting: Supervision    Transfers  Sit to Stand: Supervision  Stand to Sit: Supervision  Bed to Chair: Supervision    Balance  Sitting: Intact  Standing: Intact; With support         Gait Training: Yes    Weight Bearing Status  Right Side Weight Bearing: As tolerated  Distance (ft): 600 Feet (ft)  Ambulation - Level of Assistance: Supervision;Stand-by assistance  Assistive Device: Walker, rolling  Speed/Lizeth: Pace decreased (<100 feet/min)  Step Length: Left shortened  Stance: Right decreased  Gait Abnormalities: Antalgic;Decreased step clearance  Number of Stairs Trained: 3  Stairs - Level of Assistance: Minimum assistance  Rail Use: None  Interventions: Safety awareness training;Verbal cues     Braces/Orthotics: none    Right Knee Cold  Type: Cryocuff  Patient Position: Sitting      Body Structures Involved:  1. Muscles Body Functions Affected:  1. Movement Related Activities and Participation Affected:  1. Mobility  2. Self Care   Number of elements that affect the Plan of Care: 4+: HIGH COMPLEXITY   CLINICAL PRESENTATION:   Presentation: Stable and uncomplicated: LOW COMPLEXITY   CLINICAL DECISION MAKING:   [facility] AM-PAC 6 Clicks   Basic Mobility Inpatient Short Form  How much difficulty does the patient currently have. .. Unable A Lot A Little None   1. Turning over in bed (including adjusting bedclothes, sheets and blankets)? [] 1   [] 2   [x] 3   [] 4   2. Sitting down on and standing up from a chair with arms ( e.g., wheelchair, bedside commode, etc.)   [] 1   [] 2   [x] 3   [] 4   3. Moving from lying on back to sitting on the side of the bed? [] 1   [] 2   [x] 3   [] 4   How much help from another person does the patient currently need. .. Total A Lot A Little None   4.   Moving to and from a bed to a chair (including a wheelchair)? [] 1   [] 2   [x] 3   [] 4   5. Need to walk in hospital room? [] 1   [] 2   [x] 3   [] 4   6. Climbing 3-5 steps with a railing? [] 1   [] 2   [x] 3   [] 4   © 2007, Trustees of [address] [place] [address] [address] [address], under license to [employer]. All rights reserved     Score:  Initial: 18 Most Recent: X (Date: -- )    Interpretation of Tool:  Represents activities that are increasingly more difficult (i.e. Bed mobility, Transfers, Gait). Medical Necessity:     · Patient is expected to demonstrate progress in   · strength, range of motion, and functional technique  ·  to   · decrease assistance required with functional mobility and TKA management independently  · .  Reason for Services/Other Comments:  · Patient continues to require skilled intervention due to   · Inability to complete functional mobility and TKA management independently  · . Use of outcome tool(s) and clinical judgement create a POC that gives a: Clear prediction of patient's progress: LOW COMPLEXITY            TREATMENT:   (In addition to Assessment/Re-Assessment sessions the following treatments were rendered)     Pre-treatment Symptoms/Complaints:  none  Pain Initial:   Pain Intensity 1: 5  Post Session:  5/10   Gait Training (25 Minutes):  Gait training to improve and/or restore physical functioning as related to mobility and strength. Ambulated 600 Feet (ft) with Supervision;Stand-by assistance using a Walker, rolling and minimal Safety awareness training;Verbal cues related to their stance phase and stride length to promote proper body alignment and promote proper body posture. Instruction in performance of walker use and gait sequencing to correct stance phase and stride length. Therapeutic Exercise: (16 Minutes):  Exercises per grid below to improve mobility and strength. Required minimal verbal and manual cues to promote proper body alignment and promote proper body posture. Progressed repetitions as indicated. Date:  3/1 Date:  3/2 Date:     ACTIVITY/EXERCISE AM PM AM PM AM PM   GROUP THERAPY  []  []  []  []  []  []   Ankle Pumps  10 15 15     Quad Sets  10 15 15     Gluteal Sets  10 15 15     Hip ABd/ADduction  10 15 15     Straight Leg Raises  10 15 15     Knee Slides  10 15 15     Short Arc Quads   15 15     Long Arc Quads         Chair Slides   15 15              B = bilateral; AA = active assistive; A = active; P = passive      Treatment/Session Assessment:     Response to Treatment:  pt tolerated well, continues to progress nicely. Education:  [x] Home Exercises  [x] Fall Precautions  [x] Use of Cold Therapy Unit [x] D/C Instruction Review  [] Knee Prosthesis Review  [x] Walker Management/Safety [] Adaptive Equipment as Needed       Interdisciplinary Collaboration:   o Registered Nurse    After treatment position/precautions:   o Up in chair  o Bed/Chair-wheels locked  o Call light within reach  o Family at bedside    Compliance with Program/Exercises: Compliant all of the time. Recommendations/Intent for next treatment session:  Treatment next visit will focus on increasing Mr. Marcela Mehta independence with bed mobility, transfers, gait training, strength/ROM exercises, modalities for pain, and patient education.       Total Treatment Duration:  PT Patient Time In/Time Out  Time In: 1425  Time Out: [address], PT

## 2021-03-02 NOTE — PROGRESS NOTES
Problem: Falls - Risk of  Goal: *Absence of Falls  Description: Document Gauri Blood Fall Risk and appropriate interventions in the flowsheet.   Outcome: Progressing Towards Goal  Note: Fall Risk Interventions:  Mobility Interventions: Communicate number of staff needed for ambulation/transfer, Patient to call before getting OOB, Utilize walker, cane, or other assistive device, PT Consult for mobility concerns    Mentation Interventions: Adequate sleep, hydration, pain control, Increase mobility, More frequent rounding, Toileting rounds, Room close to nurse's station, Update white board    Medication Interventions: Bed/chair exit alarm, Evaluate medications/consider consulting pharmacy, Patient to call before getting OOB, Teach patient to arise slowly    Elimination Interventions: Call light in reach, Patient to call for help with toileting needs, Stay With Me (per policy), Toilet paper/wipes in reach, Toileting schedule/hourly rounds

## 2021-03-02 NOTE — DISCHARGE INSTRUCTIONS
[address] [facility]   Patient Discharge Instructions    Juliana Gaytan / [id number] : 1950    Admitted 3/1/2021 Discharged: 3/2/2021     IF YOU HAVE ANY PROBLEMS ONCE YOU ARE AT HOME CALL THE FOLLOWING NUMBERS:   Main office number: [phone]    Take Home Medications     · It is important that you take the medication exactly as they are prescribed. · Keep your medication in the bottles provided by the pharmacist and keep a list of the medication names, dosages, and times to be taken in your wallet. · Do not take other medications without consulting your doctor. What to do at [address] your prehospital diet. If you have excessive nausea or vomitting call your doctor's office     Home Physical Therapy is arranged. Use rolling walker when walking. Patients who have had a joint replacement should not drive until you are seen for your follow up appointment by Dr. Doyle Florez. When to Call    - Call if you have a temperature greater then 101  - Unable to keep food down  - Loose control of your bladder or bowel function  - Are unable to bear any weight   - Need a pain medication refill       DISCHARGE SUMMARY from Nurse    The following personal items collected during your admission are returned to you:   Dental Appliance: Dental Appliances: None  Vision:    Hearing Aid:    Jewelry: Jewelry: None  Clothing: Clothing: Other (comment)  Other Valuables: Other Valuables: Cell Phone, Wallet(PT'S WIFE HAS WALLET AND PHONE)  Valuables sent to safe:      PATIENT INSTRUCTIONS:    After general anesthesia or intravenous sedation, for 24 hours or while taking prescription Narcotics:  · Limit your activities  · Do not drive and operate hazardous machinery  · Do not make important personal or business decisions  · Do  not drink alcoholic beverages  · If you have not urinated within 8 hours after discharge, please contact your surgeon on call.     Report the following to your surgeon:  · Excessive pain, swelling, redness or odor of or around the surgical area  · Temperature over 101  · Nausea and vomiting lasting longer than 4 hours or if unable to take medications  · Any signs of decreased circulation or nerve impairment to extremity: change in color, persistent  numbness, tingling, coldness or increase pain  · Any questions, call office @ [phone]      Keep scheduled follow up appointment. If need to change, call office @ [phone]. *  Please give a list of your current medications to your Primary Care Provider. *  Please update this list whenever your medications are discontinued, doses are      changed, or new medications (including over-the-counter products) are added. *  Please carry medication information at all times in case of emergency situations. Patient Education        Total Knee Replacement: What to Expect at [address] [place] [address] Drive had a total knee replacement. The doctor replaced the worn ends of the bones that connect to your knee (thighbone and lower leg bone) with plastic and metal parts. When you leave the hospital, you should be able to move around with a walker or crutches. But you will need someone to help you at home for the next few weeks or until you have more energy and can move around better. If you need more extensive rehab, you may go to a specialized rehab center for more treatment. You will go home with a bandage and stitches, staples, tissue glue, or tape strips. Change the bandage as your doctor tells you to. If you have stitches or staples, your doctor will remove them 10 to 21 days after your surgery. Glue or tape strips will fall off on their own over time. You may still have some mild pain, and the area may be swollen for 3 to 6 months after surgery. Your knee will continue to improve for 6 to 12 months. You will probably use a walker for 1 to 3 weeks and then use crutches. When you are ready, you can use a cane.  You will probably be able to walk on your own in 4 to 8 weeks. You will need to do months of physical rehabilitation (rehab) after a knee replacement. Rehab will help you strengthen the muscles of the knee and help you regain movement. After you recover, your artificial knee will allow you to do normal daily activities with less pain or no pain at all. You may be able to hike, dance, ride a bike, and play golf. Talk to your doctor about whether you can do more strenuous activities. Always tell your caregivers that you have an artificial knee. How long it will take to walk on your own, return to normal activities, and go back to work depends on your health and how well your rehabilitation (rehab) program goes. The better you do with your rehab exercises, the quicker you will get your strength and movement back. This care sheet gives you a general idea about how long it will take for you to recover. But each person recovers at a different pace. Follow the steps below to get better as quickly as possible. How can you care for yourself at home? Activity    · Rest when you feel tired. You may take a nap, but don't stay in bed all day. When you sit, use a chair with arms. You can use the arms to help you stand up.     · Work with your physical therapist to find the best way to exercise. What you can do as your knee heals will depend on whether your new knee is cemented or uncemented. You may not be able to do certain things for a while if your new knee is uncemented.     · After your knee has healed enough, you can do more strenuous activities with caution. ? You can golf, but use a golf cart. And don't wear shoes with spikes. ? You can bike on a flat road or on a stationary bike. Avoid biking up hills. ? Your doctor may suggest that you stay away from activities that put stress on your knee. These include tennis, badminton, squash, racquetball, contact sports like football, jumping (such as in basketball), jogging, and running. ?  Avoid activities where you might fall. These include horseback riding, skiing, and mountain biking.     · Do not sit for more than 1 hour at a time. Get up and walk around for a while before you sit again. If you must sit for a long time, prop up your leg with a chair or footstool. This will help you avoid swelling.     · Ask your doctor when you can drive again. It may take up to 8 weeks after knee replacement surgery before it's safe for you to drive.     · When you get into a car, sit on the edge of the seat. Then pull in your legs, and turn to face the front.     · You should be able to do many everyday activities 3 to 6 weeks after your surgery. You will probably need to take 4 to 16 weeks off from work. When you can go back to work depends on the type of work you do and how you feel.     · Ask your doctor when it is okay for you to have sex.     · For 12 weeks, do not lift anything heavier than 10 pounds and do not lift weights. Diet    · By the time you leave the hospital, you should be eating your normal diet. If your stomach is upset, try bland, low-fat foods like plain rice, broiled chicken, toast, and yogurt. Your doctor may suggest that you take iron and vitamin supplements.     · Drink plenty of fluids (unless your doctor tells you not to).   · Eat healthy foods, and watch your portion sizes. Try to stay at your ideal weight. Too much weight puts more stress on your new knee.     · You may notice that your bowel movements are not regular right after your surgery. This is common. Try to avoid constipation and straining with bowel movements. You may want to take a fiber supplement every day. If you have not had a bowel movement after a couple of days, ask your doctor about taking a mild laxative. Medicines    · Your doctor will tell you if and when you can restart your medicines.  He or she will also give you instructions about taking any new medicines.     · If you take aspirin or some other blood thinner, ask your doctor if and when to start taking it again. Make sure that you understand exactly what your doctor wants you to do.     · Your doctor may give you a blood-thinning medicine to prevent blood clots. If you take a blood thinner, be sure you get instructions about how to take your medicine safely. Blood thinners can cause serious bleeding problems. This medicine could be in pill form or as a shot (injection). If a shot is needed, your doctor will tell you how to do this.     · Be safe with medicines. Take pain medicines exactly as directed. ? If the doctor gave you a prescription medicine for pain, take it as prescribed. ? If you are not taking a prescription pain medicine, ask your doctor if you can take an over-the-counter medicine. ? Plan to take your pain medicine 30 minutes before exercises. It is easier to prevent pain before it starts than to stop it after it has started.     · If you think your pain medicine is making you sick to your stomach:  ? Take your medicine after meals (unless your doctor has told you not to). ? Ask your doctor for a different pain medicine.     · If your doctor prescribed antibiotics, take them as directed. Do not stop taking them just because you feel better. You need to take the full course of antibiotics. Incision care    · If your doctor told you how to care for your cut (incision), follow your doctor's instructions. You will have a dressing over the cut. A dressing helps the incision heal and protects it. Your doctor will tell you how to take care of this.     · If you did not get instructions, follow this general advice:  ? If you have strips of tape on the cut the doctor made, leave the tape on for a week or until it falls off.  ? If you have stitches or staples, your doctor will tell you when to come back to have them removed. ? If you have skin adhesive on the cut, leave it on until it falls off. Skin adhesive is also called glue or liquid stitches.   ? Change the bandage every day.  ? Wash the area daily with warm water, and pat it dry. Don't use hydrogen peroxide or alcohol. They can slow healing. ? You may cover the area with a gauze bandage if it oozes fluid or rubs against clothing. ? You may shower 24 to 48 hours after surgery. Pat the incision dry. Don't swim or take a bath for the first 2 weeks, or until your doctor tells you it is okay. Exercise    · Your rehab program will give you a number of exercises to do to help you get back your knee's range of motion and strength. Always do them as your therapist tells you. Ice    · For pain and swelling, put ice or a cold pack on the area for 10 to 20 minutes at a time. Put a thin cloth between the ice and your skin. Other instructions    · Keep wearing your support stockings as your doctor says. These help to prevent blood clots. How long you'll have to wear them depends on your activity level and the amount of swelling.     · Carry a medical alert card that says you have an artificial joint. You have metal pieces in your knee. These may set off some airport metal detectors. Follow-up care is a key part of your treatment and safety. Be sure to make and go to all appointments, and call your doctor if you are having problems. It's also a good idea to know your test results and keep a list of the medicines you take. When should you call for help? Call 911 anytime you think you may need emergency care. For example, call if:    · You passed out (lost consciousness).     · You have severe trouble breathing.     · You have sudden chest pain and shortness of breath, or you cough up blood. Call your doctor now or seek immediate medical care if:    · You have signs of infection, such as:  ? Increased pain, swelling, warmth, or redness. ? Red streaks leading from the incision. ? Pus draining from the incision. ?  A fever.     · You have signs of a blood clot, such as:  ? Pain in your calf, back of the knee, thigh, or groin.  ? Redness and swelling in your leg or groin.     · Your incision comes open and begins to bleed, or the bleeding increases.     · You have pain that does not get better after you take pain medicine. Watch closely for changes in your health, and be sure to contact your doctor if:    · You do not have a bowel movement after taking a laxative. Where can you learn more? Go to http://www.gray.com/  Enter T054 in the search box to learn more about \"Total Knee Replacement: What to Expect at Home. \"  Current as of: March 2, 2020               Content Version: 12.6  © 6661-0431 [employer]. Care instructions adapted under license by [employer] (which disclaims liability or warranty for this information). If you have questions about a medical condition or this instruction, always ask your healthcare professional. [address] any warranty or liability for your use of this information. These are general instructions for a healthy lifestyle:    No smoking/ No tobacco products/ Avoid exposure to second hand smoke    Surgeon General's Warning:  Quitting smoking now greatly reduces serious risk to your health. Obesity, smoking, and sedentary lifestyle greatly increases your risk for illness    A healthy diet, regular physical exercise & weight monitoring are important for maintaining a healthy lifestyle    You may be retaining fluid if you have a history of heart failure or if you experience any of the following symptoms:  Weight gain of 3 pounds or more overnight or 5 pounds in a week, increased swelling in our hands or feet or shortness of breath while lying flat in bed. Please call your doctor as soon as you notice any of these symptoms; do not wait until your next office visit.     Recognize signs and symptoms of STROKE:    F-face looks uneven    A-arms unable to move or move even    S-speech slurred or non-existent    T-time-call 911 as soon as signs and symptoms begin-DO NOT go       Back to bed or wait to see if you get better-TIME IS BRAIN. The discharge information has been reviewed with the patient. The patient verbalized understanding. Information obtained by :  I understand that if any problems occur once I am at home I am to contact my physician. I understand and acknowledge receipt of the instructions indicated above.                                                                                                                                            Physician's or R.N.'s Signature                                                                  Date/Time                                                                                                                                              Patient or Representative Signature                                                          Date/Time

## 2021-03-02 NOTE — PROGRESS NOTES
Problem: Self Care Deficits Care Plan (Adult)  Goal: *Acute Goals and Plan of Care (Insert Text)  Description: GOALS:   DISCHARGE GOALS (in preparation for going home/rehab):  3 days  1. Mr. Jesse Martinez will perform one lower body dressing activity with minimal assistance required to demonstrate improved functional mobility and safety. -GOAL MET 3/2/2021    2. Mr. Jesse Martinez will perform one lower body bathing activity with minimal assistance required to demonstrate improved functional mobility and safety. -GOAL MET 3/2/2021   3. Mr. Jesse Martinez will perform toileting/toilet transfer with contact guard assistance to demonstrate improved functional mobility and safety. -GOAL MET 3/2/2021   4. Mr. Jesse Martinez will perform shower transfer with contact guard assistance to demonstrate improved functional mobility and safety. -GOAL MET 3/2/2021       JOINT CAMP OCCUPATIONAL THERAPY TKA: Daily Note and Discharge 3/2/2021  INPATIENT: Hospital Day: 2  [place] / [place] / Product Type: Medicare /      NAME/AGE/GENDER: Petr Mandujano is a 65 y.o. male   PRIMARY DIAGNOSIS:  Acquired deformity of right knee [M21.961]  Primary localized osteoarthritis of right knee [M17.11]   Procedure(s) and Anesthesia Type:     * RIGHT KNEE ARTHROPLASTY TOTAL ROBOTIC ASSISTED/ ITZEL/ JOSHUA - Spinal (Right)  ICD-10: Treatment Diagnosis:    · Pain in Right Knee (M25.561)  · Stiffness of Right Knee, Not elsewhere classified (M25.661)      ASSESSMENT:     Mr. Jesse Martinez is s/p Right TKA and presents with decreased weight bearing on R LE and decreased independence with functional mobility and activities of daily living as compared to baseline level of function and safety. Patient would benefit from skilled Occupational Therapy to maximize independence and safety with self-care task and functional mobility. Pt would also benefit from education on adaptive equipment and safety precautions in preparation for going home.       Will do well at home for self cares and transfers during ADL's.  D/C OT for acute deficits. This section established at most recent assessment   PROBLEM LIST (Impairments causing functional limitations):  1. Decreased Strength  2. Decreased ADL/Functional Activities  3. Decreased Transfer Abilities  4. Increased Pain  5. Increased Fatigue  6. Decreased Flexibility/Joint Mobility  7. Decreased Knowledge of Precautions   INTERVENTIONS PLANNED: (Benefits and precautions of occupational therapy have been discussed with the patient.)  1. Activities of daily living training  2. Adaptive equipment training  3. Balance training  4. Clothing management  5. Donning&doffing training  6. Theraputic activity     TREATMENT PLAN: Frequency/Duration: Follow patient 1-2tx to address above goals. Rehabilitation Potential For Stated Goals: Good     RECOMMENDED REHABILITATION/EQUIPMENT: (at time of discharge pending progress): Continue Skilled Therapy. OCCUPATIONAL PROFILE AND HISTORY:   History of Present Injury/Illness (Reason for Referral): Pt presents this date s/p (Right) TKA. Past Medical History/Comorbidities:   Mr. Lindsay Dyer  has a past medical history of Allergies, Arthritis, Calculus of kidney, Cataracts, bilateral, Headache, History of elevated PSA, Sleep apnea, and Trigeminal neuralgia (2014). He also has no past medical history of Aneurysm ([address] Arrhythmia, Asthma, Autoimmune disease ([address] CAD (coronary artery disease), Cancer ([address] Chronic kidney disease, Chronic obstructive pulmonary disease ([address] Coagulation disorder ([address] Diabetes ([address] Difficult intubation, Endocarditis, GERD (gastroesophageal reflux disease), Heart failure ([address] Hypertension, Liver disease, Malignant hyperthermia due to anesthesia, Nausea & vomiting, Pseudocholinesterase deficiency, PUD (peptic ulcer disease), Rheumatic fever, Seizures ([address] Stroke ([address] Thromboembolus ([address] or Thyroid disease.   Mr. Lindsay Dyer  has a past surgical history that includes hx tonsil and adenoidectomy; hx prostate surgery (2015); hx heent; hx cataract removal (Bilateral); hx colonoscopy; and hx orthopaedic (Right, 2012, 2014). Social History/Living Environment:   Home Environment: Private residence  # Steps to Enter: 3  Rails to Enter: No  One/Two Story Residence: One story  Living Alone: No  Support Systems: Spouse/Significant Other/Partner  Patient Expects to be Discharged to[de-identified] Private residence  Current DME Used/Available at Home: Cane, straight, Crutches, Raised toilet seat, Walker, rolling  Tub or Shower Type: Shower  Prior Level of Function/Work/Activity:  Independent prior. Number of Personal Factors/Comorbidities that affect the Plan of Care: Brief history (0):  LOW COMPLEXITY   ASSESSMENT OF OCCUPATIONAL PERFORMANCE[de-identified]   Most Recent Physical Functioning:   Balance  Sitting: Intact  Standing: With support                    Coordination  Fine Motor Skills-Upper: Left Intact; Right Intact  Gross Motor Skills-Upper: Left Intact; Right Intact         Mental Status  Neurologic State: Alert  Orientation Level: Oriented X4  Cognition: Appropriate decision making  Perception: Appears intact                Basic ADLs (From Assessment) Complex ADLs (From Assessment)   Basic ADL  Feeding: Independent  Oral Facial Hygiene/Grooming: Independent  Bathing: Supervision  Type of Bath: Chlorhexidine (CHG), Full, Shower  Upper Body Dressing: Independent  Lower Body Dressing: Supervision  Toileting: Supervision     Grooming/Bathing/Dressing Activities of Daily Living                       Functional Transfers  Bathroom Mobility: Supervision/set up  Toilet Transfer : Supervision  Shower Transfer: Supervision     Bed/Mat Mobility  Supine to Sit: Supervision  Sit to Stand: Supervision  Stand to Sit: Supervision  Bed to Chair: Supervision  Scooting: Supervision         Physical Skills Involved:  1. Range of Motion  2. Balance  3. Strength  4.  Activity Tolerance Cognitive Skills Affected (resulting in the inability to perform in a timely and safe manner):  1. [facility] Psychosocial Skills Affected:  1. WFL   Number of elements that affect the Plan of Care: 1-3:  LOW COMPLEXITY   CLINICAL DECISION MAKIN[address] [place] [address] AM-PAC 6 Clicks   Daily Activity Inpatient Short Form  How much help from another person does the patient currently need. .. Total A Lot A Little None   1. Putting on and taking off regular lower body clothing? [] 1   [] 2   [x] 3   [] 4   2. Bathing (including washing, rinsing, drying)? [] 1   [] 2   [x] 3   [] 4   3. Toileting, which includes using toilet, bedpan or urinal?   [] 1   [] 2   [x] 3   [] 4   4. Putting on and taking off regular upper body clothing? [] 1   [] 2   [] 3   [x] 4   5. Taking care of personal grooming such as brushing teeth? [] 1   [] 2   [] 3   [x] 4   6. Eating meals? [] 1   [] 2   [] 3   [x] 4   © , Trustees of [address] [place] [address], under license to [employer]. All rights reserved     Score:  Initial: 18 Most Recent: 21 (Date: 3/2/2021 )    Interpretation of Tool:  Represents activities that are increasingly more difficult (i.e. Bed mobility, Transfers, Gait). Medical Necessity:     · Skilled intervention continues to be required due to Deficits noted above. Reason for Services/Other Comments:  · Patient continues to require skilled intervention due to   · New TKA  · . Use of outcome tool(s) and clinical judgement create a POC that gives a: MODERATE COMPLEXITY            TREATMENT:   (In addition to Assessment/Re-Assessment sessions the following treatments were rendered)     Pre-treatment Symptoms/Complaints:    Pain: Initial:   Pain Intensity 1: 2  Pain Location 1: Knee  Pain Orientation 1: Right  Post Session:  2     Self Care: (40): Procedure(s) (per grid) utilized to improve and/or restore self-care/home management as related to dressing, bathing, toileting and grooming.  Required minimal verbal and tactile cueing to facilitate activities of daily living skills. Treatment/Session Assessment:     Response to Treatment:  Good, sitting up in reclincer. Education:  [] Home Exercises  [x] Fall Precautions  [] Hip Precautions [] Going Home Video  [x] Knee/Hip Prosthesis Review  [x] Walker Management/Safety [x] Adaptive Equipment as Needed       Interdisciplinary Collaboration:   o Physical Therapist  o Occupational Therapist  o Registered Nurse    After treatment position/precautions:   o Up in chair  o Bed/Chair-wheels locked  o Caregiver at bedside  o Call light within reach  o RN notified     Compliance with Program/Exercises: Compliant all of the time, Will assess as treatment progresses. Recommendations/Intent for next treatment session:  D/C Ot for acute deficits.  .      Total Treatment Duration:  OT Patient Time In/Time Out  Time In: 0812  Time Out: [facility] [place]

## 2021-03-02 NOTE — ADDENDUM NOTE
Addendum  created 03/02/21 0810 by Keyana Bejarano CRNA    Flowsheet accepted, Intraprocedure Flowsheets edited

## 2021-03-02 NOTE — PROGRESS NOTES
2021         Post Op day: 1 Day Post-Op     Admit Date: 3/1/2021  Admit Diagnosis: Acquired deformity of right knee [M21.961]; Primary localized osteoarthritis of right knee [M17.11]; Osteoarthritis of right knee [M17.11]        Subjective: Patient stable. No acute events. Objective:   Visit Vitals  /84 (BP 1 Location: Right upper arm, BP Patient Position: Sitting)   Pulse 63   Temp 97.7 °F (36.5 °C)   Resp 18   Ht 5' 8\" (1.727 m)   Wt 234 lb 6.4 oz (106.3 kg)   SpO2 96%   BMI 35.64 kg/m²    Temp (24hrs), Av.6 °F (36.4 °C), Min:97.2 °F (36.2 °C), Max:98.2 °F (36.8 °C)    Lab Results   Component Value Date/Time    HGB 13.6 2021 03:31 AM     Extremity Exam  Dressing clean and dry   Tibialis Anterior and Gastroc-Soleus functioning normally right lower extremity  Sensation intact to light touch on operative limb  Extremity perfused  TEDS/SCDS in place  No sign of DVT     Assessment / Plan :  WBAT RLE  Continue PT/OT  Continue current DVT prophylaxis in house. Discharge on ASA BID  DIspo-HH  Patient Active Problem List   Diagnosis Code    Trigeminal neuralgia G50.0    Gout M10.9    Erectile dysfunction N52.9    Benign prostatic hyperplasia with lower urinary tract symptoms N40.1    Pure hypercholesterolemia E78.00    Osteoarthritis of right knee M17.11          Signed By: Sherryle Alken, MD     I have reviewed the patients controlled substance prescription history, as maintained in the [place] prescription monitoring program, so that the prescription(s) for a  controlled substance can be given.

## 2021-03-02 NOTE — PROGRESS NOTES
03/01/21 2143   Oxygen Therapy   O2 Sat (%) 95 %   Pulse via Oximetry 83 beats per minute   O2 Device Room air   Patient placed on continuous sat monitor. Data cleared and alarms set. No distress noted at this time.

## 2021-03-03 VITALS
HEIGHT: 68 IN | RESPIRATION RATE: 18 BRPM | DIASTOLIC BLOOD PRESSURE: 91 MMHG | HEART RATE: 63 BPM | OXYGEN SATURATION: 96 % | TEMPERATURE: 97.2 F | WEIGHT: 234.4 LBS | SYSTOLIC BLOOD PRESSURE: 126 MMHG | BODY MASS INDEX: 35.52 KG/M2

## 2021-03-03 PROBLEM — M17.0 BILATERAL PRIMARY OSTEOARTHRITIS OF KNEE: Status: ACTIVE | Noted: 2021-03-03

## 2021-03-03 LAB — HGB BLD-MCNC: 12.7 G/DL (ref 13.6–17.2)

## 2021-03-03 PROCEDURE — 85018 HEMOGLOBIN: CPT

## 2021-03-03 PROCEDURE — 74011250637 HC RX REV CODE- 250/637: Performed by: NURSE PRACTITIONER

## 2021-03-03 PROCEDURE — 36415 COLL VENOUS BLD VENIPUNCTURE: CPT

## 2021-03-03 PROCEDURE — 97110 THERAPEUTIC EXERCISES: CPT

## 2021-03-03 PROCEDURE — 97116 GAIT TRAINING THERAPY: CPT

## 2021-03-03 PROCEDURE — 74011250637 HC RX REV CODE- 250/637: Performed by: ORTHOPAEDIC SURGERY

## 2021-03-03 RX ADMIN — Medication 220 MG: at 08:23

## 2021-03-03 RX ADMIN — LORATADINE 10 MG: 10 TABLET ORAL at 08:24

## 2021-03-03 RX ADMIN — OXYCODONE HYDROCHLORIDE AND ACETAMINOPHEN 500 MG: 500 TABLET ORAL at 08:24

## 2021-03-03 RX ADMIN — Medication 1 AMPULE: at 08:22

## 2021-03-03 RX ADMIN — ACETAMINOPHEN 1000 MG: 500 TABLET, FILM COATED ORAL at 00:25

## 2021-03-03 RX ADMIN — TAMSULOSIN HYDROCHLORIDE 0.4 MG: 0.4 CAPSULE ORAL at 08:23

## 2021-03-03 RX ADMIN — DOCUSATE SODIUM 50MG AND SENNOSIDES 8.6MG 2 TABLET: 8.6; 5 TABLET, FILM COATED ORAL at 08:22

## 2021-03-03 RX ADMIN — OXYCODONE HYDROCHLORIDE 10 MG: 10 TABLET, FILM COATED, EXTENDED RELEASE ORAL at 08:23

## 2021-03-03 RX ADMIN — Medication 10 ML: at 05:50

## 2021-03-03 RX ADMIN — MULTIPLE VITAMINS W/ MINERALS TAB 1 TABLET: TAB at 08:22

## 2021-03-03 RX ADMIN — Medication 81 MG: at 08:22

## 2021-03-03 RX ADMIN — HYDROMORPHONE HYDROCHLORIDE 2 MG: 2 TABLET ORAL at 08:23

## 2021-03-03 RX ADMIN — MELOXICAM 7.5 MG: 7.5 TABLET ORAL at 08:23

## 2021-03-03 RX ADMIN — GABAPENTIN 300 MG: 300 CAPSULE ORAL at 08:23

## 2021-03-03 NOTE — PROGRESS NOTES
Problem: Mobility Impaired (Adult and Pediatric)  Goal: *Acute Goals and Plan of Care (Insert Text)  Description: GOALS (1-4 days):  (1.)Mr. Irizarry will move from supine to sit and sit to supine  in bed with SUPERVISION. (2.)Mr. Irizarry will transfer from bed to chair and chair to bed with STAND BY ASSIST using the least restrictive device. (3.)Mr. Irizarry will ambulate with STAND BY ASSIST for 300 feet with the least restrictive device. Met 3/3/21  (4.)Mr. Irizarry will ambulate up/down 3 steps with bilateral  railing with CONTACT GUARD ASSIST with no device. (5.)Mr. Irizarry will increase right knee ROM to 5°-80°.  ________________________________________________________________________________________________    Outcome: Progressing Towards Goal     PHYSICAL THERAPY JOINT CAMP TKA: Daily Note and AM 3/3/2021  INPATIENT: Hospital Day: 3  [place] / [place] / Product Type: Medicare /      NAME/AGE/GENDER: Crissy Salvador is a 65 y.o. male   PRIMARY DIAGNOSIS:  Acquired deformity of right knee [M21.961]  Primary localized osteoarthritis of right knee [M17.11]   Procedure(s) and Anesthesia Type:     * RIGHT KNEE ARTHROPLASTY TOTAL ROBOTIC ASSISTED/ ITZEL/ JOSHUA - Spinal (Right)  ICD-10: Treatment Diagnosis:    · Pain in Right Knee (M25.561)  · Stiffness of Right Knee, Not elsewhere classified (M25.661)  · Difficulty in walking, Not elsewhere classified (R26.2)      ASSESSMENT:     Mr. Sonia Bello presents with decreased strength and range of motion right lower extremity and with decreased independence with functional mobility s/p right TKA. Pt will benefit from skilled PT interventions to maximize independence with functional mobility s/p right TKA. Pt did well with assessment and walked in room. In room in chair worked on TKA exercises with verbal cues. He stayed up in chair with ice on knee and needs in reach.  He plans to discharge to home from hospital with HHPT for follow up.   3/2- pt was up in chair on contact and agreeable to therapy. Worked on his TKA exercises with verbal cues progressing with repetitions. Reviewed HEP and use of ice along with frequency. Pt verbalizes and demonstrates understanding. Worked on gait training in the [place] with verbal cues making great progress with distance. Doing well with reciprocal gait pattern and is steady with the walker. Practiced going up and down 3 steps with hand rail. Walked back to room and stayed up in chair with ice on knee and needs in reach. In the afternoon, pt up in chair on contact with spouse at bedside. He is ready for therapy. We worked on his TKA exercises in the chair with verbal cues. Reviewed HEP for spouse's benefit as well. Worked on gait training in the glover with verbal cues. Pt continues to do well with reciprocal gait pattern, he does state more pain this afternoon. In gym he wanted to go up and down the stairs again, practiced without rails and with cane in one hand and PT on the the other side. Walked back to room and he stayed up in chair with needs in reach and ice on knee. He states he wants to go home tomorrow. 3/3/21:  Patient up in chair. Participated well this am.  Good demonstration of all exercises with great knee flexion ROM. Has been resting with a pillow under the distal portion of LE to encourage extension. Ambulating well with use of walker-continuous movement of walker and equal step length. No concerns prior to d/c home. This section established at most recent assessment   PROBLEM LIST (Impairments causing functional limitations):  1. Decreased Strength  2. Decreased ADL/Functional Activities  3. Decreased Transfer Abilities  4. Decreased Ambulation Ability/Technique  5. Increased Pain  6. Decreased Flexibility/Joint Mobility  7. Edema/Girth  8.  Decreased [place] with Home Exercise Program   INTERVENTIONS PLANNED: (Benefits and precautions of physical therapy have been discussed with the patient.)  1. Bed Mobility  2. Cold  3. Gait Training  4. Home Exercise Program (HEP)  5. Range of Motion (ROM)  6. Therapeutic Activites  7. Therapeutic Exercise/Strengthening  8. Transfer Training     TREATMENT PLAN: Frequency/Duration: Follow patient BID for duration of hospital stay to address above goals. Rehabilitation Potential For Stated Goals: Good     RECOMMENDED REHABILITATION/EQUIPMENT: (at time of discharge pending progress): Continue Skilled Therapy, Home Health: Physical Therapy, and Outpatient: Physical Therapy. HISTORY:   History of Present Injury/Illness (Reason for Referral):  Pt s/p right TKA on 3/1/2021  Past Medical History/Comorbidities:   Mr. Genesis Abreu  has a past medical history of Allergies, Arthritis, Calculus of kidney, Cataracts, bilateral, Headache, History of elevated PSA, Sleep apnea, and Trigeminal neuralgia (2014). He also has no past medical history of Aneurysm ([address] Arrhythmia, Asthma, Autoimmune disease ([address] CAD (coronary artery disease), Cancer ([address] Chronic kidney disease, Chronic obstructive pulmonary disease ([address] Coagulation disorder ([address] Diabetes ([address] Difficult intubation, Endocarditis, GERD (gastroesophageal reflux disease), Heart failure ([address] Hypertension, Liver disease, Malignant hyperthermia due to anesthesia, Nausea & vomiting, Pseudocholinesterase deficiency, PUD (peptic ulcer disease), Rheumatic fever, Seizures ([address] Stroke ([address] Thromboembolus ([address] or Thyroid disease. Mr. Genesis Abreu  has a past surgical history that includes hx tonsil and adenoidectomy; hx prostate surgery (2015); hx heent; hx cataract removal (Bilateral); hx colonoscopy; and hx orthopaedic (Right, 2012, 2014).   Social History/Living Environment:   Home Environment: Private residence  # Steps to Enter: 3  Rails to Enter: No  One/Two Story Residence: One story  Living Alone: No  Support Systems: Spouse/Significant Other/Partner  Patient Expects to be Discharged to[employer] residence  Current DME Used/Available at Home: Cane, straight, Crutches, Raised toilet seat, Walker, rolling  Tub or Shower Type: Shower  Prior Level of Function/Work/Activity:  Pt living at home, independent with mobility without assistive devices   Number of Personal Factors/Comorbidities that affect the Plan of Care: 0: LOW COMPLEXITY   EXAMINATION:   Most Recent Physical Functioning:      Gross Assessment  AROM: Within functional limits(L LE)  Strength: Within functional limits(L LE)        RLE AROM  R Knee Flexion: 109  R Knee Extension: 7       RLE Strength  R Hip Flexion: 2+  R Knee Extension: 3-  R Ankle Dorsiflexion: 4         Transfers  Sit to Stand: Supervision  Stand to Sit: Supervision    Balance  Sitting: Intact  Standing: With support              Weight Bearing Status  Right Side Weight Bearing: As tolerated  Distance (ft): 650 Feet (ft)  Ambulation - Level of Assistance: Supervision  Assistive Device: Walker, rolling  Speed/Lizeth: Pace decreased (<100 feet/min)  Step Length: Left shortened;Right shortened  Stance: Right decreased  Gait Abnormalities: Antalgic  Interventions: Safety awareness training;Verbal cues     Braces/Orthotics: none    Right Knee Cold  Type: Cryocuff      Body Structures Involved:  1. Muscles Body Functions Affected:  1. Movement Related Activities and Participation Affected:  1. Mobility  2. Self Care   Number of elements that affect the Plan of Care: 4+: HIGH COMPLEXITY   CLINICAL PRESENTATION:   Presentation: Stable and uncomplicated: LOW COMPLEXITY   CLINICAL DECISION [facility] [place] [address] [id number] AM-PAC 6 Clicks   Basic Mobility Inpatient Short Form  How much difficulty does the patient currently have. .. Unable A Lot A Little None   1. Turning over in bed (including adjusting bedclothes, sheets and blankets)? [] 1   [] 2   [x] 3   [] 4   2.   Sitting down on and standing up from a chair with arms ( e.g., wheelchair, bedside commode, etc.)   [] 1   [] 2   [x] 3   [] 4 3. Moving from lying on back to sitting on the side of the bed? [] 1   [] 2   [x] 3   [] 4   How much help from another person does the patient currently need. .. Total A Lot A Little None   4. Moving to and from a bed to a chair (including a wheelchair)? [] 1   [] 2   [x] 3   [] 4   5. Need to walk in hospital room? [] 1   [] 2   [x] 3   [] 4   6. Climbing 3-5 steps with a railing? [] 1   [] 2   [x] 3   [] 4   © 2007, Trustees of [address] [place] [address] [address] [address], under license to [employer]. All rights reserved     Score:  Initial: 18 Most Recent: X (Date: -- )    Interpretation of Tool:  Represents activities that are increasingly more difficult (i.e. Bed mobility, Transfers, Gait). Medical Necessity:     · Patient is expected to demonstrate progress in   · strength, range of motion, and functional technique  ·  to   · decrease assistance required with functional mobility and TKA management independently  · .  Reason for Services/Other Comments:  · Patient continues to require skilled intervention due to   · Inability to complete functional mobility and TKA management independently  · . Use of outcome tool(s) and clinical judgement create a POC that gives a: Clear prediction of patient's progress: LOW COMPLEXITY            TREATMENT:   (In addition to Assessment/Re-Assessment sessions the following treatments were rendered)     Pre-treatment Symptoms/Complaints:  Patient agreeable. Pain Initial:   Pain Intensity 1: 3  Post Session:  3/10   Gait Training (10 Minutes):  Gait training to improve and/or restore physical functioning as related to mobility and strength. Ambulated 650 Feet (ft) with Supervision using a Walker, rolling and minimal Safety awareness training;Verbal cues related to their stance phase and stride length to promote proper body alignment and promote proper body posture. Instruction in performance of walker use and gait sequencing to correct stance phase and stride length.     Therapeutic Exercise: (20 Minutes):  Exercises per grid below to improve mobility and strength. Required minimal verbal and manual cues to promote proper body alignment and promote proper body posture. Progressed repetitions as indicated. Date:  3/1 Date:  3/2 Date:  3/3/21   ACTIVITY/EXERCISE AM PM AM PM AM PM   GROUP THERAPY  []  []  []  []  []  []   Ankle Pumps  10 15 15 20    Quad Sets  10 15 15 20    Gluteal Sets  10 15 15 20    Hip ABd/ADduction  10 15 15 20    Straight Leg Raises  10 15 15 20    Knee Slides  10 15 15 20    Short Arc Quads   15 15 20    Long Arc Quads         Chair Slides   15 15 20             B = bilateral; AA = active assistive; A = active; P = passive      Treatment/Session Assessment:     Response to Treatment:  Patient participated well. Good demonstration of all exercises, great knee flexion ROM, and ambulating well. Education:  [x] Home Exercises  [x] Fall Precautions  [x] Use of Cold Therapy Unit [x] D/C Instruction Review  [] Knee Prosthesis Review  [x] Walker Management/Safety [] Adaptive Equipment as Needed       Interdisciplinary Collaboration:   o Physical Therapist  o Registered Nurse    After treatment position/precautions:   o Up in chair  o Bed/Chair-wheels locked  o Call light within reach  o RN notified    Compliance with Program/Exercises: Compliant all of the time. Recommendations/Intent for next treatment session:  Treatment next visit will focus on increasing Mr. Nidhi Lam independence with bed mobility, transfers, gait training, strength/ROM exercises, modalities for pain, and patient education.       Total Treatment Duration:  PT Patient Time In/Time Out  Time In: 0624  Time Out: 0825    Krysten Schaefer, PT

## 2021-03-03 NOTE — PROGRESS NOTES
I have reviewed discharge instructions with the patient. Instructed on last pain medication given and when patient is allowed for next dose, ONLY TO BE TAKEN IF NEEDED. The patient verbalized understanding. Signed discharge paper in paper chart. Meds escribed. Will discharge home with family.

## 2021-03-03 NOTE — PROGRESS NOTES
Problem: Falls - Risk of  Goal: *Absence of Falls  Description: Document Bard Gerard Fall Risk and appropriate interventions in the flowsheet.   Outcome: Progressing Towards Goal  Note: Fall Risk Interventions:  Mobility Interventions: Bed/chair exit alarm, OT consult for ADLs, Patient to call before getting OOB, Utilize walker, cane, or other assistive device    Mentation Interventions: Adequate sleep, hydration, pain control, Eyeglasses and hearing aids, Door open when patient unattended, Evaluate medications/consider consulting pharmacy, Increase mobility, More frequent rounding, Toileting rounds    Medication Interventions: Evaluate medications/consider consulting pharmacy, Patient to call before getting OOB, Teach patient to arise slowly    Elimination Interventions: Call light in reach, Patient to call for help with toileting needs, Stay With Me (per policy), Toilet paper/wipes in reach

## 2021-03-03 NOTE — PROGRESS NOTES
Patient in recliner. Dressing dry and intact, scant old drainage. Pain med given-SEE MAR. Neurovascular status WDL. Palpable pulses. Positive dorsiflexion and plantar flexion. Instructed not to get up by themselves and call for assistance or any needs. Patient verbalized understanding. Call bell within reach. Side rails up x3. Bed low and locked. No distress noted.

## 2021-03-03 NOTE — PROGRESS NOTES
March 3, 2021         Post Op day: 2 Days Post-Op     Admit Date: 3/1/2021  Admit Diagnosis: Acquired deformity of right knee [M21.961]; Primary localized osteoarthritis of right knee [M17.11]; Osteoarthritis of right knee [M17.11]        Subjective: Patient stable. No acute events. Objective:   Visit Vitals  /76 (BP 1 Location: Left upper arm, BP Patient Position: At rest)   Pulse 66   Temp 97.5 °F (36.4 °C)   Resp 18   Ht 5' 8\" (1.727 m)   Wt 234 lb 6.4 oz (106.3 kg)   SpO2 98%   BMI 35.64 kg/m²    Temp (24hrs), Av.6 °F (36.4 °C), Min:97.3 °F (36.3 °C), Max:98.1 °F (36.7 °C)    Lab Results   Component Value Date/Time    HGB 12.7 (L) 2021 04:24 AM     Extremity Exam  Dressing clean and dry   Tibialis Anterior and Gastroc-Soleus functioning normally right lower extremity  Sensation intact to light touch on operative limb  Extremity perfused  TEDS/SCDS in place  No sign of DVT     Assessment / Plan :  WBAT RLE  Continue PT/OT  Continue current DVT prophylaxis in house. Discharge on ASA BID  DIspo-HH  Patient Active Problem List   Diagnosis Code    Trigeminal neuralgia G50.0    Gout M10.9    Erectile dysfunction N52.9    Benign prostatic hyperplasia with lower urinary tract symptoms N40.1    Pure hypercholesterolemia E78.00    Osteoarthritis of right knee M17.11          Signed By: Catherine Esparza MD     I have reviewed the patients controlled substance prescription history, as maintained in the [place] prescription monitoring program, so that the prescription(s) for a  controlled substance can be given.

## 2021-03-04 ENCOUNTER — HOME CARE VISIT (OUTPATIENT)
Dept: SCHEDULING | Facility: HOME HEALTH | Age: 71
End: 2021-03-04
Payer: MEDICARE

## 2021-03-04 VITALS
HEART RATE: 88 BPM | SYSTOLIC BLOOD PRESSURE: 105 MMHG | RESPIRATION RATE: 18 BRPM | TEMPERATURE: 98.5 F | DIASTOLIC BLOOD PRESSURE: 80 MMHG

## 2021-03-04 PROCEDURE — 400018 HH-NO PAY CLAIM PROCEDURE

## 2021-03-04 PROCEDURE — 400013 HH SOC

## 2021-03-04 PROCEDURE — 3331090002 HH PPS REVENUE DEBIT

## 2021-03-04 PROCEDURE — 3331090001 HH PPS REVENUE CREDIT

## 2021-03-04 PROCEDURE — G0151 HHCP-SERV OF PT,EA 15 MIN: HCPCS

## 2021-03-05 PROCEDURE — 3331090002 HH PPS REVENUE DEBIT

## 2021-03-05 PROCEDURE — 3331090001 HH PPS REVENUE CREDIT

## 2021-03-06 PROCEDURE — 3331090002 HH PPS REVENUE DEBIT

## 2021-03-06 PROCEDURE — 3331090001 HH PPS REVENUE CREDIT

## 2021-03-06 NOTE — DISCHARGE SUMMARY
[address] [place] [address]  Total Joint Discharge Summary      Patient ID:  Saima Villatoro  [id number]  65 y.o.  1950    Admit date: 3/1/2021  Discharge date: 3/3/2021 11:15 AM  Admitting Surgeon: Lauren London MD  Admission Diagnoses: Acquired deformity of right knee [M21.961]; Primary localized osteoarthritis of right knee [M17.11]; Osteoarthritis of right knee [M17.11]  Discharge Diagnoses: Active Problems:    Osteoarthritis of right knee (3/1/2021)      Procedure: Procedure(s) (LRB):  RIGHT KNEE ARTHROPLASTY TOTAL ROBOTIC ASSISTED/ ITZEL/ JOSHUA (Right)    Brief History: Was admitted 3/1/2021 for Procedure(s) (LRB):  RIGHT KNEE ARTHROPLASTY TOTAL ROBOTIC ASSISTED/ ITZEL/ JOSHUA (Right)    Hospital Course: Patient underwent surgery 3/1/2021 and under went Procedure(s) (LRB):  RIGHT KNEE ARTHROPLASTY TOTAL ROBOTIC ASSISTED/ ITZEL/ JOSHUA (Right). Patient did well postop. Pain was well controlled postop. Physical therapy and occupational therapy were initiated the day of surgery. The patient was placed on dual both chemical and mechanical DVT prophylaxis after surgery. The patient progressed well with therapy postop and was deemed safe and appropriate for discharge after surgery. Patient was discharged 3/3/2021 40:71 AM.    Complications in Hospital: None                             Perioperative Antibiotics: Antibiotics per administered per protocol based on weight and prior drug allergies. If the patient was undergoing revision surgery or had a positive MRSA nasal swab the patient also received vancomycin. Hospital Medications given:   No current facility-administered medications for this encounter. Current Outpatient Medications   Medication Sig    meloxicam (MOBIC) 7.5 mg tablet Take 1 Tab by mouth two (2) times a day.  acetaminophen (TYLENOL) 500 mg tablet Take 2 Tabs by mouth every six (6) hours as needed for Pain.     aspirin delayed-release 81 mg tablet Take 1 Tab by mouth two (2) times a day.  cyclobenzaprine (FLEXERIL) 5 mg tablet Take 1 Tab by mouth three (3) times daily as needed for Muscle Spasm(s).  HYDROmorphone (DILAUDID) 2 mg tablet Take 1 Tab by mouth every four (4) hours as needed for Pain for up to 7 days. Max Daily Amount: 12 mg.    ondansetron (ZOFRAN ODT) 8 mg disintegrating tablet Take 1 Tab by mouth every eight (8) hours as needed for Nausea or Vomiting.  senna-docusate (PERICOLACE) 8.6-50 mg per tablet Take 1 Tab by mouth daily.  ascorbic acid, vitamin C, (Vitamin C) 500 mg tablet Take 500 mg by mouth daily.  zinc 50 mg tab tablet Take 50 mg by mouth daily.  gabapentin (NEURONTIN) 300 mg capsule Take 1 Cap by mouth three (3) times daily.  multivitamin (ONE A DAY) tablet Take 1 Tab by mouth daily.  loratadine (CLARITIN) 10 mg tablet Take 1 Tab by mouth daily.  tamsulosin (FLOMAX) 0.4 mg capsule TAKE 1 CAPSULE BY MOUTH ONCE DAILY AT BEDTIME FOR 90 DAYS    finasteride (PROSCAR) 5 mg tablet TAKE 1 TABLET BY MOUTH ONCE DAILY (Patient taking differently: nightly. TAKE 1 TABLET BY MOUTH ONCE DAILY)    cholecalciferol (Vitamin D3) (2,000 UNITS /50 MCG) cap capsule Take 2,000 Units by mouth daily.  sildenafil citrate (VIAGRA) 50 mg tablet Take 1 Tab by mouth as needed for Erectile Dysfunction. Postoperative transfusions: None    Objective    Hemoglobin at discharge:   Lab Results   Component Value Date/Time    HGB 12.7 (L) 03/03/2021 04:24 AM       Extremity Exam  Dressing Clean, dry intact. Positive tibialis anterior and gastroc-soleus function in operative extremity. Sensation grossly intact  Positive PT pulse  No sign of DVT  TEDS/SCDS in place    Physical Therapy started on the day of surgery and progressed.    PT/OT:          Assistive Device: Walker (comment)  RLE AROM  R Knee Flexion: 109  R Knee Extension: 7             Discharge instructions:  -WBAT operative extremity  - Anticoagulate for 4 week period  -Resume pre hospital diet -Resume home medications per medical reconciliation form     -Ambulate with walker, appropriate total joint protocol. Follow posterior hip precautions for 6 weeks if patient is status post total hip replacement  -Follow up in office as scheduled     IF YOU HAVE ANY PROBLEMS ONCE YOU ARE  [facility] [address]:   Main office number: [phone]    Take Home Medications     Cannot display discharge medications since this patient is not currently admitted. · It is important that you take the medication exactly as they are prescribed. · Keep your medication in the bottles provided by the pharmacist and keep a list of the medication names, dosages, and times to be taken in your wallet. · Do not take other medications without consulting your doctor. What to do at [address] your prehospital diet. If you have excessive nausea or vomiting call your doctor's office. Home Physical Therapy is arranged. Use rolling walker when walking. Physical therapy will help advise you when it is safe to transition to a cane. Patients who have had a joint replacement should not drive until you are seen for your follow up appointment by Dr. Julio Cesar Hernandez. Total Knee patients keep knee elevated above heart level to prevent and resolve swelling. Continue to ice the surgical site a few times a day until your follow-up appointment    Leave dressing alone until checked by home health. Patients with Prineo wound closure mesh may shower once at home but do not soak the wound (bath, swimming pool, hot tub, etc.). Gently pat dry wound after shower. No aggressive scrubbing as this may rub off adhesive used to keep wound sealed and irritate the wound. Further do not wear tight clothing such a spandex, yoga pants, or biker shorts as this type of clothing may pull the mesh dressing off. Wear loose fitting clothes only. Finally, do not allow anyone to remove this dressing.  Dr Julio Cesar Hernandez will remove the dressing in his office at your first postoperative visit. If your wound was closed with staples Home Health or the nursing facility will assist with dressing changes. Take it easy, you have just had major surgery. Too much activity will cause additional soreness and swelling. When to Call    - Call if you have a temperature greater then 101  - Have increased wound drainage or wound drainage that persists after 7 days.  - Begin to notice increased redness, swelling, or pus coming from the incision  - Unable to keep food down  - Lose control of your bladder or bowel function  - Are unable to bear any weight on operative leg  - Need a pain medication refill - please have pharmacy phone number available. DO NOT WAIT UNTIL YOU ARE OUT OF MEDICATION TO CALL FOR A REFILL! WE OFTEN CANNOT FILL NARCOTIC PRESCRIPTIONS THE SAME DAY! IF YOU ARE GETTING LOW AND FEEL YOU WILL NEED MORE MEDICATION CALL AHEAD OF TIME    I have reviewed the patients controlled substance prescription history, as maintained in the [place] prescription monitoring program, so that the prescription(s) for a  controlled substance can be given.     Signed:  Ade Esqueda MD  3/6/2021  7:44 AM

## 2021-03-07 ENCOUNTER — APPOINTMENT (OUTPATIENT)
Dept: ULTRASOUND IMAGING | Age: 71
End: 2021-03-07
Attending: STUDENT IN AN ORGANIZED HEALTH CARE EDUCATION/TRAINING PROGRAM
Payer: MEDICARE

## 2021-03-07 ENCOUNTER — HOSPITAL ENCOUNTER (EMERGENCY)
Age: 71
Discharge: HOME | End: 2021-03-07
Attending: STUDENT IN AN ORGANIZED HEALTH CARE EDUCATION/TRAINING PROGRAM
Payer: MEDICARE

## 2021-03-07 VITALS
RESPIRATION RATE: 16 BRPM | HEIGHT: 68 IN | WEIGHT: 230 LBS | SYSTOLIC BLOOD PRESSURE: 176 MMHG | OXYGEN SATURATION: 99 % | BODY MASS INDEX: 34.86 KG/M2 | HEART RATE: 74 BPM | DIASTOLIC BLOOD PRESSURE: 80 MMHG | TEMPERATURE: 98.4 F

## 2021-03-07 DIAGNOSIS — I82.459 ACUTE DEEP VEIN THROMBOSIS (DVT) OF PERONEAL VEIN, UNSPECIFIED LATERALITY (HCC): Primary | ICD-10-CM

## 2021-03-07 LAB
ALBUMIN SERPL-MCNC: 2.9 G/DL (ref 3.2–4.6)
ALBUMIN/GLOB SERPL: 0.8 {RATIO} (ref 1.2–3.5)
ALP SERPL-CCNC: 43 U/L (ref 50–136)
ALT SERPL-CCNC: 25 U/L (ref 12–65)
ANION GAP SERPL CALC-SCNC: 4 MMOL/L (ref 7–16)
AST SERPL-CCNC: 22 U/L (ref 15–37)
BASOPHILS # BLD: 0 K/UL (ref 0–0.2)
BASOPHILS NFR BLD: 0 % (ref 0–2)
BILIRUB SERPL-MCNC: 0.6 MG/DL (ref 0.2–1.1)
BUN SERPL-MCNC: 20 MG/DL (ref 8–23)
CALCIUM SERPL-MCNC: 8.3 MG/DL (ref 8.3–10.4)
CHLORIDE SERPL-SCNC: 106 MMOL/L (ref 98–107)
CO2 SERPL-SCNC: 29 MMOL/L (ref 21–32)
CREAT SERPL-MCNC: 0.72 MG/DL (ref 0.8–1.5)
DIFFERENTIAL METHOD BLD: ABNORMAL
EOSINOPHIL # BLD: 0.3 K/UL (ref 0–0.8)
EOSINOPHIL NFR BLD: 4 % (ref 0.5–7.8)
ERYTHROCYTE [DISTWIDTH] IN BLOOD BY AUTOMATED COUNT: 13 % (ref 11.9–14.6)
GLOBULIN SER CALC-MCNC: 3.5 G/DL (ref 2.3–3.5)
GLUCOSE SERPL-MCNC: 96 MG/DL (ref 65–100)
HCT VFR BLD AUTO: 35.9 % (ref 41.1–50.3)
HGB BLD-MCNC: 11.6 G/DL (ref 13.6–17.2)
IMM GRANULOCYTES # BLD AUTO: 0.1 K/UL (ref 0–0.5)
IMM GRANULOCYTES NFR BLD AUTO: 1 % (ref 0–5)
LYMPHOCYTES # BLD: 1.8 K/UL (ref 0.5–4.6)
LYMPHOCYTES NFR BLD: 19 % (ref 13–44)
MCH RBC QN AUTO: 28.9 PG (ref 26.1–32.9)
MCHC RBC AUTO-ENTMCNC: 32.3 G/DL (ref 31.4–35)
MCV RBC AUTO: 89.5 FL (ref 79.6–97.8)
MONOCYTES # BLD: 0.9 K/UL (ref 0.1–1.3)
MONOCYTES NFR BLD: 9 % (ref 4–12)
NEUTS SEG # BLD: 6.4 K/UL (ref 1.7–8.2)
NEUTS SEG NFR BLD: 68 % (ref 43–78)
NRBC # BLD: 0 K/UL (ref 0–0.2)
PLATELET # BLD AUTO: 271 K/UL (ref 150–450)
PMV BLD AUTO: 9.8 FL (ref 9.4–12.3)
POTASSIUM SERPL-SCNC: 4.9 MMOL/L (ref 3.5–5.1)
PROT SERPL-MCNC: 6.4 G/DL (ref 6.3–8.2)
RBC # BLD AUTO: 4.01 M/UL (ref 4.23–5.6)
SODIUM SERPL-SCNC: 139 MMOL/L (ref 136–145)
WBC # BLD AUTO: 9.5 K/UL (ref 4.3–11.1)

## 2021-03-07 PROCEDURE — 80053 COMPREHEN METABOLIC PANEL: CPT

## 2021-03-07 PROCEDURE — 74011250637 HC RX REV CODE- 250/637: Performed by: EMERGENCY MEDICINE

## 2021-03-07 PROCEDURE — 3331090002 HH PPS REVENUE DEBIT

## 2021-03-07 PROCEDURE — 85025 COMPLETE CBC W/AUTO DIFF WBC: CPT

## 2021-03-07 PROCEDURE — 99283 EMERGENCY DEPT VISIT LOW MDM: CPT

## 2021-03-07 PROCEDURE — 3331090001 HH PPS REVENUE CREDIT

## 2021-03-07 PROCEDURE — 93971 EXTREMITY STUDY: CPT

## 2021-03-07 RX ADMIN — APIXABAN 10 MG: 5 TABLET, FILM COATED ORAL at 17:04

## 2021-03-07 NOTE — ED PROVIDER NOTES
49-year-old male patient status post right knee replacement on 3/1/2021 by Dr. Leif Cadet presents to the emergency department with reports of increased pain, swelling and redness to the leg. Patient has been doing therapy since discharge from the hospital.  He states he ambulated around his yard yesterday. He woke this morning and noted swelling diffusely through the leg. His pain has been well controlled with Dilaudid prescribed to him at home. He has been taking acetaminophen as well. Patient denies any falls or trauma to the area. He reports no fever or chills. He has been using ice as instructed by his orthopedic specialist.  His concern is for blood clot. He was referred to this department to rule this issue out. Patient denies any chest pain or shortness of breath at this time.            Past Medical History:   Diagnosis Date    Allergies     environmental    Arthritis     osteo    Bilateral primary osteoarthritis of knee 3/3/2021    Calculus of kidney     Cataracts, bilateral     Headache     History of elevated PSA     BPH. stable w/meds    Sleep apnea     sleep study performed, pt could not tolerate Cpap    Trigeminal neuralgia 2014    right side of face/head       Past Surgical History:   Procedure Laterality Date    HX CATARACT REMOVAL Bilateral     HX COLONOSCOPY      HX HEENT      HX ORTHOPAEDIC Right 2012, 2014    meniscus repair     HX PROSTATE SURGERY  2015    prostate biopsy     HX TONSIL AND ADENOIDECTOMY           Family History:   Problem Relation Age of Onset    Heart Disease Mother         CAD, CABG     Cancer Mother [de-identified]        colon     Diabetes Mother     Elevated Lipids Mother     Kidney Disease Mother     Heart Failure Mother     Diabetes Father     Glaucoma Father     Cancer Sister         liver     Diabetes Brother     Psychiatric Disorder Brother     Suicide Brother     Downs Syndrome Brother     Heart Disease Maternal Grandmother 79        MI     Heart Disease Maternal Grandfather 79        MI     Stroke Paternal Grandmother     Heart Disease Paternal Grandfather 48        MI        Social History     Socioeconomic History    Marital status:      Spouse name: Not on file    Number of children: Not on file    Years of education: Not on file    Highest education level: Not on file   Occupational History    Not on file   Social Needs    Financial resource strain: Not on file    Food insecurity     Worry: Not on file     Inability: Not on file   [employer] needs     Medical: Not on file     Non-medical: Not on file   Tobacco Use    Smoking status: Former Smoker     Years: 0.50    Smokeless tobacco: Never Used    Tobacco comment: former occasinal cigar use; last use 10 yrs    Substance and Sexual Activity    Alcohol use: Yes     Alcohol/week: 4.0 standard drinks     Types: 2 Glasses of wine, 2 Cans of beer per week     Comment: occassionally    Drug use: Never    Sexual activity: Not Currently     Partners: Female   Lifestyle    Physical activity     Days per week: Not on file     Minutes per session: Not on file    Stress: Not on file   Relationships    Social connections     Talks on phone: Not on file     Gets together: Not on file     Attends Christian service: Not on file     Active member of club or organization: Not on file     Attends meetings of clubs or organizations: Not on file     Relationship status: Not on file    Intimate partner violence     Fear of current or ex partner: Not on file     Emotionally abused: Not on file     Physically abused: Not on file     Forced sexual activity: Not on file   Other Topics Concern    Not on file   Social History Narrative    Not on file         ALLERGIES: Patient has no known allergies. Review of Systems   Constitutional: Negative for chills, diaphoresis and fever. HENT: Negative for congestion, sneezing and sore throat. Eyes: Negative for visual disturbance.    Respiratory: Negative for cough, chest tightness, shortness of breath and wheezing. Cardiovascular: Positive for leg swelling. Negative for chest pain. Gastrointestinal: Negative for abdominal pain, blood in stool, diarrhea, nausea and vomiting. Endocrine: Negative for polyuria. Genitourinary: Negative for difficulty urinating, dysuria, flank pain, hematuria and urgency. Musculoskeletal: Negative for back pain, myalgias, neck pain and neck stiffness. Skin: Positive for color change. Negative for rash. Neurological: Negative for dizziness, syncope, speech difficulty, weakness, light-headedness, numbness and headaches. Psychiatric/Behavioral: Negative for behavioral problems. All other systems reviewed and are negative. Vitals:    03/07/21 1433 03/07/21 1435   BP: (!) 180/85 (!) 180/85   Pulse: 78 78   Resp: 16 17   Temp: 97.4 °F (36.3 °C) 97.4 °F (36.3 °C)   SpO2: 98% 99%   Weight: 106.6 kg (235 lb) 104.3 kg (230 lb)   Height:  5' 8\" (1.727 m)            Physical Exam  Vitals signs and nursing note reviewed. Constitutional:       General: He is not in acute distress. Appearance: He is well-developed. He is not diaphoretic. Comments: Alert and oriented to person place and time. No acute distress, speaks in clear, fluid sentences. HENT:      Head: Normocephalic and atraumatic. Right Ear: External ear normal.      Left Ear: External ear normal.      Nose: Nose normal.   Eyes:      Pupils: Pupils are equal, round, and reactive to light. Neck:      Musculoskeletal: Normal range of motion. Cardiovascular:      Rate and Rhythm: Normal rate. Pulmonary:      Effort: Pulmonary effort is normal. No respiratory distress. Breath sounds: No stridor. No decreased breath sounds. Abdominal:      General: There is no distension. Palpations: Abdomen is soft. Musculoskeletal:         General: No tenderness or deformity.       Comments: Right lower extremity is diffusely swollen with tense, nonpitting edema generally through the leg extending from the upper thigh down through the calf to the ankle. There is redness and subacute ecchymosis on the medial aspect the patient's knee and thigh. Surgical dressing overlies anterior aspect of the knee with minimal drainage to the dressing. There is no seepage from this dressing. Distal pulses are intact distant    Left lower extremity is unremarkable. Skin:     General: Skin is warm and dry. Neurological:      Mental Status: He is alert and oriented to person, place, and time. Cranial Nerves: No cranial nerve deficit. MDM  Number of Diagnoses or Management Options  Diagnosis management comments:  We will obtain basic labs and duplex imaging of the right lower extremity       Amount and/or Complexity of Data Reviewed  Clinical lab tests: ordered and reviewed  Tests in the radiology section of CPT®: ordered and reviewed    Risk of Complications, Morbidity, and/or Mortality  Presenting problems: moderate  Diagnostic procedures: low  Management options: moderate    Patient Progress  Patient progress: stable         Procedures

## 2021-03-07 NOTE — DISCHARGE INSTRUCTIONS
Eliquis 10 mg starting again tomorrow morning twice daily for 1 week then 5 mg twice daily thereafter. Call your orthopedic surgeon tomorrow morning regarding physical therapy start date. I would recommend waiting about 3 days but they may feel differently. Return if chest pain with breathing or trouble breathing.

## 2021-03-07 NOTE — ED NOTES
I have reviewed discharge instructions with the patient. The patient verbalized understanding. Patient left ED via Discharge Method: ambulatory to Home with spouse. Opportunity for questions and clarification provided. Patient given 1 scripts. To continue your aftercare when you leave the hospital, you may receive an automated call from our care team to check in on how you are doing. This is a free service and part of our promise to provide the best care and service to meet your aftercare needs.  If you have questions, or wish to unsubscribe from this service please call [phone]. Thank you for Choosing our [facility] Emergency Department.

## 2021-03-08 ENCOUNTER — HOME CARE VISIT (OUTPATIENT)
Dept: HOME HEALTH SERVICES | Facility: HOME HEALTH | Age: 71
End: 2021-03-08
Payer: MEDICARE

## 2021-03-08 PROCEDURE — 3331090002 HH PPS REVENUE DEBIT

## 2021-03-08 PROCEDURE — 3331090001 HH PPS REVENUE CREDIT

## 2021-03-09 PROCEDURE — 3331090001 HH PPS REVENUE CREDIT

## 2021-03-09 PROCEDURE — 3331090002 HH PPS REVENUE DEBIT

## 2021-03-10 ENCOUNTER — HOME CARE VISIT (OUTPATIENT)
Dept: SCHEDULING | Facility: HOME HEALTH | Age: 71
End: 2021-03-10
Payer: MEDICARE

## 2021-03-10 VITALS
OXYGEN SATURATION: 97 % | RESPIRATION RATE: 16 BRPM | DIASTOLIC BLOOD PRESSURE: 84 MMHG | HEART RATE: 88 BPM | TEMPERATURE: 98.4 F | SYSTOLIC BLOOD PRESSURE: 136 MMHG

## 2021-03-10 PROCEDURE — 3331090001 HH PPS REVENUE CREDIT

## 2021-03-10 PROCEDURE — G0157 HHC PT ASSISTANT EA 15: HCPCS

## 2021-03-10 PROCEDURE — 3331090002 HH PPS REVENUE DEBIT

## 2021-03-11 PROCEDURE — 3331090002 HH PPS REVENUE DEBIT

## 2021-03-11 PROCEDURE — 3331090001 HH PPS REVENUE CREDIT

## 2021-03-12 ENCOUNTER — HOME CARE VISIT (OUTPATIENT)
Dept: SCHEDULING | Facility: HOME HEALTH | Age: 71
End: 2021-03-12
Payer: MEDICARE

## 2021-03-12 VITALS
TEMPERATURE: 98.2 F | RESPIRATION RATE: 15 BRPM | DIASTOLIC BLOOD PRESSURE: 86 MMHG | HEART RATE: 92 BPM | OXYGEN SATURATION: 97 % | SYSTOLIC BLOOD PRESSURE: 120 MMHG

## 2021-03-12 PROCEDURE — 3331090002 HH PPS REVENUE DEBIT

## 2021-03-12 PROCEDURE — G0157 HHC PT ASSISTANT EA 15: HCPCS

## 2021-03-12 PROCEDURE — 3331090001 HH PPS REVENUE CREDIT

## 2021-03-13 PROCEDURE — 3331090001 HH PPS REVENUE CREDIT

## 2021-03-13 PROCEDURE — 3331090002 HH PPS REVENUE DEBIT

## 2021-03-14 PROCEDURE — 3331090002 HH PPS REVENUE DEBIT

## 2021-03-14 PROCEDURE — 3331090001 HH PPS REVENUE CREDIT

## 2021-03-15 ENCOUNTER — HOME CARE VISIT (OUTPATIENT)
Dept: SCHEDULING | Facility: HOME HEALTH | Age: 71
End: 2021-03-15
Payer: MEDICARE

## 2021-03-15 VITALS
TEMPERATURE: 98.2 F | SYSTOLIC BLOOD PRESSURE: 136 MMHG | DIASTOLIC BLOOD PRESSURE: 88 MMHG | RESPIRATION RATE: 15 BRPM | OXYGEN SATURATION: 98 % | HEART RATE: 88 BPM

## 2021-03-15 PROCEDURE — 3331090001 HH PPS REVENUE CREDIT

## 2021-03-15 PROCEDURE — G0157 HHC PT ASSISTANT EA 15: HCPCS

## 2021-03-15 PROCEDURE — 3331090002 HH PPS REVENUE DEBIT

## 2021-03-16 PROCEDURE — 3331090002 HH PPS REVENUE DEBIT

## 2021-03-16 PROCEDURE — 3331090001 HH PPS REVENUE CREDIT

## 2021-03-17 ENCOUNTER — HOME CARE VISIT (OUTPATIENT)
Dept: SCHEDULING | Facility: HOME HEALTH | Age: 71
End: 2021-03-17
Payer: MEDICARE

## 2021-03-17 VITALS
HEART RATE: 84 BPM | SYSTOLIC BLOOD PRESSURE: 110 MMHG | TEMPERATURE: 97.9 F | DIASTOLIC BLOOD PRESSURE: 84 MMHG | RESPIRATION RATE: 15 BRPM

## 2021-03-17 PROCEDURE — G0157 HHC PT ASSISTANT EA 15: HCPCS

## 2021-03-17 PROCEDURE — 3331090001 HH PPS REVENUE CREDIT

## 2021-03-17 PROCEDURE — 3331090002 HH PPS REVENUE DEBIT

## 2021-03-18 PROCEDURE — 3331090001 HH PPS REVENUE CREDIT

## 2021-03-18 PROCEDURE — 3331090002 HH PPS REVENUE DEBIT

## 2021-03-19 ENCOUNTER — HOME CARE VISIT (OUTPATIENT)
Dept: SCHEDULING | Facility: HOME HEALTH | Age: 71
End: 2021-03-19
Payer: MEDICARE

## 2021-03-19 VITALS
SYSTOLIC BLOOD PRESSURE: 132 MMHG | RESPIRATION RATE: 15 BRPM | TEMPERATURE: 98.3 F | HEART RATE: 92 BPM | DIASTOLIC BLOOD PRESSURE: 86 MMHG

## 2021-03-19 PROCEDURE — 3331090001 HH PPS REVENUE CREDIT

## 2021-03-19 PROCEDURE — 3331090002 HH PPS REVENUE DEBIT

## 2021-03-19 PROCEDURE — G0157 HHC PT ASSISTANT EA 15: HCPCS

## 2021-03-19 NOTE — PROGRESS NOTES
PTA contacted Dr. Maggie Luis office at 2:41pm and requested an order for OP PT be sent to [facility] on [facility] at [phone], per pt request. Awaiting further necessary correspondence.

## 2021-03-20 PROCEDURE — 3331090001 HH PPS REVENUE CREDIT

## 2021-03-20 PROCEDURE — 3331090002 HH PPS REVENUE DEBIT

## 2021-03-21 PROCEDURE — 3331090001 HH PPS REVENUE CREDIT

## 2021-03-21 PROCEDURE — 3331090002 HH PPS REVENUE DEBIT

## 2021-03-22 PROCEDURE — 3331090002 HH PPS REVENUE DEBIT

## 2021-03-22 PROCEDURE — 3331090001 HH PPS REVENUE CREDIT

## 2021-03-23 ENCOUNTER — HOME CARE VISIT (OUTPATIENT)
Dept: SCHEDULING | Facility: HOME HEALTH | Age: 71
End: 2021-03-23
Payer: MEDICARE

## 2021-03-23 VITALS
SYSTOLIC BLOOD PRESSURE: 110 MMHG | TEMPERATURE: 98.6 F | DIASTOLIC BLOOD PRESSURE: 84 MMHG | RESPIRATION RATE: 16 BRPM | HEART RATE: 84 BPM

## 2021-03-23 PROCEDURE — 3331090001 HH PPS REVENUE CREDIT

## 2021-03-23 PROCEDURE — G0157 HHC PT ASSISTANT EA 15: HCPCS

## 2021-03-23 PROCEDURE — 3331090002 HH PPS REVENUE DEBIT

## 2021-03-24 PROCEDURE — 3331090002 HH PPS REVENUE DEBIT

## 2021-03-24 PROCEDURE — 3331090001 HH PPS REVENUE CREDIT

## 2021-03-25 ENCOUNTER — HOME CARE VISIT (OUTPATIENT)
Dept: SCHEDULING | Facility: HOME HEALTH | Age: 71
End: 2021-03-25
Payer: MEDICARE

## 2021-03-25 VITALS
TEMPERATURE: 98.6 F | DIASTOLIC BLOOD PRESSURE: 84 MMHG | SYSTOLIC BLOOD PRESSURE: 114 MMHG | HEART RATE: 86 BPM | RESPIRATION RATE: 17 BRPM

## 2021-03-25 PROCEDURE — 3331090002 HH PPS REVENUE DEBIT

## 2021-03-25 PROCEDURE — G0151 HHCP-SERV OF PT,EA 15 MIN: HCPCS

## 2021-03-25 PROCEDURE — 3331090001 HH PPS REVENUE CREDIT

## 2021-10-15 ENCOUNTER — APPOINTMENT (OUTPATIENT)
Dept: ULTRASOUND IMAGING | Age: 71
End: 2021-10-15
Attending: PHYSICIAN ASSISTANT
Payer: MEDICARE

## 2021-10-15 ENCOUNTER — HOSPITAL ENCOUNTER (EMERGENCY)
Age: 71
Discharge: HOME | End: 2021-10-15
Attending: EMERGENCY MEDICINE
Payer: MEDICARE

## 2021-10-15 VITALS
WEIGHT: 220 LBS | HEART RATE: 70 BPM | HEIGHT: 68 IN | OXYGEN SATURATION: 96 % | BODY MASS INDEX: 33.34 KG/M2 | RESPIRATION RATE: 16 BRPM | SYSTOLIC BLOOD PRESSURE: 135 MMHG | TEMPERATURE: 98.1 F | DIASTOLIC BLOOD PRESSURE: 90 MMHG

## 2021-10-15 DIAGNOSIS — I82.451 DEEP VENOUS THROMBOSIS (DVT) OF RIGHT PERONEAL VEIN, UNSPECIFIED CHRONICITY (HCC): Primary | ICD-10-CM

## 2021-10-15 PROCEDURE — 99283 EMERGENCY DEPT VISIT LOW MDM: CPT

## 2021-10-15 PROCEDURE — 93971 EXTREMITY STUDY: CPT

## 2021-10-15 NOTE — ED PROVIDER NOTES
78-year-old with prior history of right lower extremity DVT following knee replacement surgery presents with intermittent right calf pain described as a cramping and worse with movement for the last 2 to 3 days. He denies any chest pain or trouble breathing. He was taken off his Eliquis this past June and started aspirin 3 days ago. No redness warmth or discoloration. No numbness tingling or weakness. The history is provided by the patient. Leg Pain   This is a recurrent problem. The current episode started more than 2 days ago. The problem occurs daily. The problem has not changed since onset. Pain location: Right calf. The pain is mild. Pertinent negatives include no numbness and no tingling. He has tried nothing for the symptoms. There has been no history of extremity trauma.         Past Medical History:   Diagnosis Date    Allergies     environmental    Arthritis     osteo    Bilateral primary osteoarthritis of knee 3/3/2021    Calculus of kidney     Cataracts, bilateral     Headache     History of elevated PSA     BPH. stable w/meds    Sleep apnea     sleep study performed, pt could not tolerate Cpap    Trigeminal neuralgia 2014    right side of face/head       Past Surgical History:   Procedure Laterality Date    HX CATARACT REMOVAL Bilateral     HX COLONOSCOPY      HX HEENT      HX ORTHOPAEDIC Right 2012, 2014    meniscus repair     HX PROSTATE SURGERY  2015    prostate biopsy     HX TONSIL AND ADENOIDECTOMY           Family History:   Problem Relation Age of Onset    Heart Disease Mother         CAD, CABG     Cancer Mother [de-identified]        colon     Diabetes Mother     Elevated Lipids Mother     Kidney Disease Mother     Heart Failure Mother     Diabetes Father     Glaucoma Father     Cancer Sister         liver     Diabetes Brother     Psychiatric Disorder Brother     Suicide Brother     Downs Syndrome Brother     Heart Disease Maternal Grandmother 79        MI     Heart Disease Maternal Grandfather 79        MI     Stroke Paternal Grandmother     Heart Disease Paternal Grandfather 48        MI        Social History     Socioeconomic History    Marital status:      Spouse name: Not on file    Number of children: Not on file    Years of education: Not on file    Highest education level: Not on file   Occupational History    Not on file   Tobacco Use    Smoking status: Former Smoker     Years: 0.50    Smokeless tobacco: Never Used    Tobacco comment: former occasinal cigar use; last use 10 yrs    Substance and Sexual Activity    Alcohol use: Yes     Alcohol/week: 4.0 standard drinks     Types: 2 Glasses of wine, 2 Cans of beer per week     Comment: occassionally    Drug use: Never    Sexual activity: Not Currently     Partners: Female   Other Topics Concern    Not on file   Social History Narrative    Not on file     Social Determinants of Health     Financial Resource Strain:     Difficulty of Paying Living Expenses:    Food Insecurity:     Worried About Running Out of Food in the Last Year:     Ran Out of Food in the Last Year:    Transportation Needs:     Lack of Transportation (Medical):  Lack of Transportation (Non-Medical):    Physical Activity:     Days of Exercise per Week:     Minutes of Exercise per Session:    Stress:     Feeling of Stress :    Social Connections:     Frequency of Communication with Friends and Family:     Frequency of Social Gatherings with Friends and Family:     Attends Sikh Services:     Active Member of Clubs or Organizations:     Attends Club or Organization Meetings:     Marital Status:    Intimate Partner Violence:     Fear of Current or Ex-Partner:     Emotionally Abused:     Physically Abused:     Sexually Abused: ALLERGIES: Patient has no known allergies. Review of Systems   Constitutional: Negative for chills and fever. Respiratory: Negative for cough and shortness of breath. Cardiovascular: Positive for leg swelling. Negative for chest pain. Gastrointestinal: Negative for nausea and vomiting. Skin: Negative for color change and rash. Neurological: Negative for tingling, weakness and numbness. Vitals:    10/15/21 1345   BP: (!) 135/90   Pulse: 70   Resp: 16   Temp: 98.1 °F (36.7 °C)   SpO2: 96%   Weight: 99.8 kg (220 lb)   Height: 5' 8\" (1.727 m)            Physical Exam  Vitals and nursing note reviewed. Cardiovascular:      Rate and Rhythm: Normal rate and regular rhythm. Heart sounds: Normal heart sounds. Pulmonary:      Effort: Pulmonary effort is normal.      Breath sounds: Normal breath sounds. Abdominal:      General: There is no distension. Palpations: Abdomen is soft. Tenderness: There is no abdominal tenderness. Musculoskeletal:         General: No swelling or tenderness. Normal range of motion. Right lower leg: No edema. Left lower leg: No edema. Neurological:      Mental Status: He is alert and oriented to person, place, and time. MDM  Number of Diagnoses or Management Options  Diagnosis management comments: Thrombus is seen in the right peroneal vein in the same location as previous DVT. Radiology reads this as possibly more chronic appearing. I have discussed the patient with his PCP Dr. Brian Pierce and we agreed to restart his Eliquis. He will see the patient in the next few days early this coming week and will refer the patient to vascular. I will prescribe Eliquis. Amount and/or Complexity of Data Reviewed  Tests in the radiology section of CPT®: ordered and reviewed (DUPLEX LOWER EXT VENOUS RIGHT    Result Date: 10/15/2021  Right lower extremity venous duplex exam. CLINICAL INDICATION: Right leg swelling and pain. PROCEDURE: Realtime grayscale, color, and spectral Doppler analysis of the right lower extremity deep venous system from the common femoral vein to the posterior tibial and peroneal veins. COMPARISON: Right leg venous duplex exam dated 3/7/2021 FINDINGS: Nonocclusive thrombus is appreciated in the right peroneal vein in the location of the patient's prior deep venous fibrosis. Otherwise, there is normal compressibility and flow dynamics appreciated throughout the right lower extremity deep venous system. No echogenic intraluminal filling defects noted to suggest deep venous thrombosis.      Nonocclusive thrombus in the right peroneal vein likely chronic clot related to the prior DVT.     )  Discuss the patient with other providers: yes    Risk of Complications, Morbidity, and/or Mortality  Presenting problems: moderate  Diagnostic procedures: low  Management options: moderate    Patient Progress  Patient progress: stable         Procedures

## 2021-10-15 NOTE — DISCHARGE INSTRUCTIONS
Restart Eliquis 5 mg twice daily. Call your primary care doctor on Monday for follow-up appointment this week. They may be contacting you as I discussed the case with Dr. Nadine Pace. Stop aspirin.

## 2021-10-15 NOTE — ED TRIAGE NOTES
Pt c/o intermittent right calf pain x1.5 weeks, pt states at times it hurts to the touch. Pt states he has a history of blood clots.

## 2021-10-15 NOTE — ED NOTES
I have reviewed discharge instructions with the patient. The patient verbalized understanding. Patient left ED via Discharge Method: ambulatory to Home with (self). Opportunity for questions and clarification provided. Patient given 1 scripts. To continue your aftercare when you leave the hospital, you may receive an automated call from our care team to check in on how you are doing. This is a free service and part of our promise to provide the best care and service to meet your aftercare needs.  If you have questions, or wish to unsubscribe from this service please call [phone]. Thank you for Choosing our [facility] Emergency Department.

## 2021-11-09 ENCOUNTER — HOSPITAL ENCOUNTER (OUTPATIENT)
Dept: LAB | Age: 71
Discharge: HOME | End: 2021-11-09
Payer: MEDICARE

## 2021-11-09 DIAGNOSIS — I82.551 CHRONIC DEEP VEIN THROMBOSIS (DVT) OF RIGHT PERONEAL VEIN (HCC): ICD-10-CM

## 2021-11-09 LAB
ALBUMIN SERPL-MCNC: 3.7 G/DL (ref 3.2–4.6)
ALBUMIN/GLOB SERPL: 1 {RATIO} (ref 1.2–3.5)
ALP SERPL-CCNC: 58 U/L (ref 50–136)
ALT SERPL-CCNC: 26 U/L (ref 12–65)
ANION GAP SERPL CALC-SCNC: 3 MMOL/L (ref 7–16)
AST SERPL-CCNC: 12 U/L (ref 15–37)
BASOPHILS # BLD: 0 K/UL (ref 0–0.2)
BASOPHILS NFR BLD: 1 % (ref 0–2)
BILIRUB SERPL-MCNC: 0.4 MG/DL (ref 0.2–1.1)
BUN SERPL-MCNC: 21 MG/DL (ref 8–23)
CALCIUM SERPL-MCNC: 8.7 MG/DL (ref 8.3–10.4)
CHLORIDE SERPL-SCNC: 107 MMOL/L (ref 98–107)
CO2 SERPL-SCNC: 30 MMOL/L (ref 21–32)
CREAT SERPL-MCNC: 1 MG/DL (ref 0.8–1.5)
D DIMER PPP FEU-MCNC: 0.33 UG/ML(FEU)
DIFFERENTIAL METHOD BLD: NORMAL
EOSINOPHIL # BLD: 0.1 K/UL (ref 0–0.8)
EOSINOPHIL NFR BLD: 1 % (ref 0.5–7.8)
ERYTHROCYTE [DISTWIDTH] IN BLOOD BY AUTOMATED COUNT: 13.4 % (ref 11.9–14.6)
GLOBULIN SER CALC-MCNC: 3.7 G/DL (ref 2.3–3.5)
GLUCOSE SERPL-MCNC: 96 MG/DL (ref 65–100)
HCT VFR BLD AUTO: 44.1 %
HGB BLD-MCNC: 14.4 G/DL (ref 13.6–17.2)
IMM GRANULOCYTES # BLD AUTO: 0 K/UL (ref 0–0.5)
IMM GRANULOCYTES NFR BLD AUTO: 0 % (ref 0–5)
LYMPHOCYTES # BLD: 2.2 K/UL (ref 0.5–4.6)
LYMPHOCYTES NFR BLD: 25 % (ref 13–44)
MCH RBC QN AUTO: 28.5 PG (ref 26.1–32.9)
MCHC RBC AUTO-ENTMCNC: 32.7 G/DL (ref 31.4–35)
MCV RBC AUTO: 87.3 FL (ref 79.6–97.8)
MONOCYTES # BLD: 0.8 K/UL (ref 0.1–1.3)
MONOCYTES NFR BLD: 9 % (ref 4–12)
NEUTS SEG # BLD: 5.4 K/UL (ref 1.7–8.2)
NEUTS SEG NFR BLD: 64 % (ref 43–78)
NRBC # BLD: 0 K/UL (ref 0–0.2)
PLATELET # BLD AUTO: 250 K/UL (ref 150–450)
PMV BLD AUTO: 9.7 FL (ref 9.4–12.3)
POTASSIUM SERPL-SCNC: 4.2 MMOL/L (ref 3.5–5.1)
PROT SERPL-MCNC: 7.4 G/DL (ref 6.3–8.2)
RBC # BLD AUTO: 5.05 M/UL (ref 4.23–5.6)
SODIUM SERPL-SCNC: 140 MMOL/L (ref 136–145)
WBC # BLD AUTO: 8.5 K/UL (ref 4.3–11.1)

## 2021-11-09 PROCEDURE — 80053 COMPREHEN METABOLIC PANEL: CPT

## 2021-11-09 PROCEDURE — 85025 COMPLETE CBC W/AUTO DIFF WBC: CPT

## 2021-11-09 PROCEDURE — 85379 FIBRIN DEGRADATION QUANT: CPT

## 2021-11-09 PROCEDURE — 36415 COLL VENOUS BLD VENIPUNCTURE: CPT

## 2022-01-13 ENCOUNTER — HOSPITAL ENCOUNTER (OUTPATIENT)
Dept: ULTRASOUND IMAGING | Age: 72
Discharge: HOME | End: 2022-01-13
Attending: INTERNAL MEDICINE
Payer: MEDICARE

## 2022-01-13 DIAGNOSIS — I82.551 CHRONIC DEEP VEIN THROMBOSIS (DVT) OF RIGHT PERONEAL VEIN (HCC): ICD-10-CM

## 2022-01-13 DIAGNOSIS — M79.89 LEG SWELLING: ICD-10-CM

## 2022-01-13 PROCEDURE — 93970 EXTREMITY STUDY: CPT

## 2022-01-19 ENCOUNTER — HOSPITAL ENCOUNTER (OUTPATIENT)
Dept: LAB | Age: 72
Discharge: HOME | End: 2022-01-19
Payer: MEDICARE

## 2022-01-19 DIAGNOSIS — I82.551 CHRONIC DEEP VEIN THROMBOSIS (DVT) OF RIGHT PERONEAL VEIN (HCC): ICD-10-CM

## 2022-01-19 LAB
ALBUMIN SERPL-MCNC: 3.5 G/DL (ref 3.2–4.6)
ALBUMIN/GLOB SERPL: 0.9 {RATIO} (ref 1.2–3.5)
ALP SERPL-CCNC: 58 U/L (ref 50–136)
ALT SERPL-CCNC: 26 U/L (ref 12–65)
ANION GAP SERPL CALC-SCNC: 6 MMOL/L (ref 7–16)
AST SERPL-CCNC: 15 U/L (ref 15–37)
BASOPHILS # BLD: 0 K/UL (ref 0–0.2)
BASOPHILS NFR BLD: 1 % (ref 0–2)
BILIRUB SERPL-MCNC: 0.5 MG/DL (ref 0.2–1.1)
BUN SERPL-MCNC: 18 MG/DL (ref 8–23)
CALCIUM SERPL-MCNC: 8.9 MG/DL (ref 8.3–10.4)
CHLORIDE SERPL-SCNC: 106 MMOL/L (ref 98–107)
CO2 SERPL-SCNC: 27 MMOL/L (ref 21–32)
CREAT SERPL-MCNC: 0.9 MG/DL (ref 0.8–1.5)
D DIMER PPP FEU-MCNC: 0.28 UG/ML(FEU)
DIFFERENTIAL METHOD BLD: NORMAL
EOSINOPHIL # BLD: 0.1 K/UL (ref 0–0.8)
EOSINOPHIL NFR BLD: 1 % (ref 0.5–7.8)
ERYTHROCYTE [DISTWIDTH] IN BLOOD BY AUTOMATED COUNT: 13.4 % (ref 11.9–14.6)
GLOBULIN SER CALC-MCNC: 3.8 G/DL (ref 2.3–3.5)
GLUCOSE SERPL-MCNC: 99 MG/DL (ref 65–100)
HCT VFR BLD AUTO: 44.8 %
HGB BLD-MCNC: 14.3 G/DL (ref 13.6–17.2)
IMM GRANULOCYTES # BLD AUTO: 0 K/UL (ref 0–0.5)
IMM GRANULOCYTES NFR BLD AUTO: 0 % (ref 0–5)
LYMPHOCYTES # BLD: 1.7 K/UL (ref 0.5–4.6)
LYMPHOCYTES NFR BLD: 25 % (ref 13–44)
MCH RBC QN AUTO: 28 PG (ref 26.1–32.9)
MCHC RBC AUTO-ENTMCNC: 31.9 G/DL (ref 31.4–35)
MCV RBC AUTO: 87.8 FL (ref 79.6–97.8)
MONOCYTES # BLD: 0.5 K/UL (ref 0.1–1.3)
MONOCYTES NFR BLD: 8 % (ref 4–12)
NEUTS SEG # BLD: 4.4 K/UL (ref 1.7–8.2)
NEUTS SEG NFR BLD: 65 % (ref 43–78)
NRBC # BLD: 0 K/UL (ref 0–0.2)
PLATELET # BLD AUTO: 261 K/UL (ref 150–450)
PMV BLD AUTO: 9.4 FL (ref 9.4–12.3)
POTASSIUM SERPL-SCNC: 4.1 MMOL/L (ref 3.5–5.1)
PROT SERPL-MCNC: 7.3 G/DL (ref 6.3–8.2)
RBC # BLD AUTO: 5.1 M/UL (ref 4.23–5.6)
SODIUM SERPL-SCNC: 139 MMOL/L (ref 136–145)
WBC # BLD AUTO: 6.7 K/UL (ref 4.3–11.1)

## 2022-01-19 PROCEDURE — 36415 COLL VENOUS BLD VENIPUNCTURE: CPT

## 2022-01-19 PROCEDURE — 85025 COMPLETE CBC W/AUTO DIFF WBC: CPT

## 2022-01-19 PROCEDURE — 80053 COMPREHEN METABOLIC PANEL: CPT

## 2022-01-19 PROCEDURE — 85379 FIBRIN DEGRADATION QUANT: CPT

## 2022-03-18 PROBLEM — E78.00 PURE HYPERCHOLESTEROLEMIA: Status: ACTIVE | Noted: 2020-11-06

## 2022-03-19 PROBLEM — G50.0 TRIGEMINAL NEURALGIA: Status: ACTIVE | Noted: 2020-10-09

## 2022-03-19 PROBLEM — N52.9 ERECTILE DYSFUNCTION: Status: ACTIVE | Noted: 2020-11-06

## 2022-03-19 PROBLEM — N40.1 BENIGN PROSTATIC HYPERPLASIA WITH LOWER URINARY TRACT SYMPTOMS: Status: ACTIVE | Noted: 2020-11-06

## 2022-03-19 PROBLEM — M10.9 GOUT: Status: ACTIVE | Noted: 2020-10-09

## 2022-03-19 PROBLEM — M17.11 OSTEOARTHRITIS OF RIGHT KNEE: Status: ACTIVE | Noted: 2021-03-01

## 2022-03-20 PROBLEM — M17.0 BILATERAL PRIMARY OSTEOARTHRITIS OF KNEE: Status: ACTIVE | Noted: 2021-03-03

## 2022-04-20 ENCOUNTER — HOSPITAL ENCOUNTER (OUTPATIENT)
Dept: LAB | Age: 72
Discharge: HOME | End: 2022-04-20
Payer: MEDICARE

## 2022-04-20 DIAGNOSIS — D68.59 THROMBOPHILIA (HCC): Primary | ICD-10-CM

## 2022-04-20 DIAGNOSIS — I82.551 CHRONIC DEEP VEIN THROMBOSIS (DVT) OF RIGHT PERONEAL VEIN (HCC): ICD-10-CM

## 2022-04-20 LAB
ALBUMIN SERPL-MCNC: 3.5 G/DL (ref 3.2–4.6)
ALBUMIN/GLOB SERPL: 1 {RATIO} (ref 1.2–3.5)
ALP SERPL-CCNC: 58 U/L (ref 50–136)
ALT SERPL-CCNC: 26 U/L (ref 12–65)
ANION GAP SERPL CALC-SCNC: 5 MMOL/L (ref 7–16)
AST SERPL-CCNC: 17 U/L (ref 15–37)
BASOPHILS # BLD: 0 K/UL (ref 0–0.2)
BASOPHILS NFR BLD: 0 % (ref 0–2)
BILIRUB SERPL-MCNC: 0.5 MG/DL (ref 0.2–1.1)
BUN SERPL-MCNC: 21 MG/DL (ref 8–23)
CALCIUM SERPL-MCNC: 8.8 MG/DL (ref 8.3–10.4)
CHLORIDE SERPL-SCNC: 105 MMOL/L (ref 98–107)
CO2 SERPL-SCNC: 29 MMOL/L (ref 21–32)
CREAT SERPL-MCNC: 1 MG/DL (ref 0.8–1.5)
D DIMER PPP FEU-MCNC: 0.53 UG/ML(FEU)
DIFFERENTIAL METHOD BLD: NORMAL
EOSINOPHIL # BLD: 0.1 K/UL (ref 0–0.8)
EOSINOPHIL NFR BLD: 2 % (ref 0.5–7.8)
ERYTHROCYTE [DISTWIDTH] IN BLOOD BY AUTOMATED COUNT: 13.4 % (ref 11.9–14.6)
GLOBULIN SER CALC-MCNC: 3.5 G/DL (ref 2.3–3.5)
GLUCOSE SERPL-MCNC: 138 MG/DL (ref 65–100)
HCT VFR BLD AUTO: 44.2 %
HGB BLD-MCNC: 14.2 G/DL (ref 13.6–17.2)
IMM GRANULOCYTES # BLD AUTO: 0 K/UL (ref 0–0.5)
IMM GRANULOCYTES NFR BLD AUTO: 0 % (ref 0–5)
LYMPHOCYTES # BLD: 2.1 K/UL (ref 0.5–4.6)
LYMPHOCYTES NFR BLD: 27 % (ref 13–44)
MCH RBC QN AUTO: 28.3 PG (ref 26.1–32.9)
MCHC RBC AUTO-ENTMCNC: 32.1 G/DL (ref 31.4–35)
MCV RBC AUTO: 88.2 FL (ref 79.6–97.8)
MONOCYTES # BLD: 0.6 K/UL (ref 0.1–1.3)
MONOCYTES NFR BLD: 7 % (ref 4–12)
NEUTS SEG # BLD: 5 K/UL (ref 1.7–8.2)
NEUTS SEG NFR BLD: 64 % (ref 43–78)
NRBC # BLD: 0 K/UL (ref 0–0.2)
PLATELET # BLD AUTO: 248 K/UL (ref 150–450)
PMV BLD AUTO: 9.8 FL (ref 9.4–12.3)
POTASSIUM SERPL-SCNC: 4.2 MMOL/L (ref 3.5–5.1)
PROT SERPL-MCNC: 7 G/DL (ref 6.3–8.2)
RBC # BLD AUTO: 5.01 M/UL (ref 4.23–5.6)
SODIUM SERPL-SCNC: 139 MMOL/L (ref 136–145)
WBC # BLD AUTO: 7.8 K/UL (ref 4.3–11.1)

## 2022-04-20 PROCEDURE — 36415 COLL VENOUS BLD VENIPUNCTURE: CPT

## 2022-04-20 PROCEDURE — 80053 COMPREHEN METABOLIC PANEL: CPT

## 2022-04-20 PROCEDURE — 85025 COMPLETE CBC W/AUTO DIFF WBC: CPT

## 2022-04-20 PROCEDURE — 85379 FIBRIN DEGRADATION QUANT: CPT

## 2022-06-25 ENCOUNTER — HOSPITAL ENCOUNTER (EMERGENCY)
Age: 72
Discharge: HOME | End: 2022-06-25
Attending: EMERGENCY MEDICINE
Payer: MEDICARE

## 2022-06-25 ENCOUNTER — HOSPITAL ENCOUNTER (EMERGENCY)
Dept: ULTRASOUND IMAGING | Age: 72
Discharge: HOME | End: 2022-06-28
Payer: MEDICARE

## 2022-06-25 VITALS
DIASTOLIC BLOOD PRESSURE: 77 MMHG | OXYGEN SATURATION: 96 % | HEIGHT: 68 IN | BODY MASS INDEX: 33.34 KG/M2 | WEIGHT: 220 LBS | TEMPERATURE: 97.8 F | RESPIRATION RATE: 17 BRPM | SYSTOLIC BLOOD PRESSURE: 146 MMHG | HEART RATE: 70 BPM

## 2022-06-25 DIAGNOSIS — M79.604 PAIN OF RIGHT LEG: ICD-10-CM

## 2022-06-25 DIAGNOSIS — M79.604 RIGHT LEG PAIN: Primary | ICD-10-CM

## 2022-06-25 PROCEDURE — 93971 EXTREMITY STUDY: CPT

## 2022-06-25 PROCEDURE — 99282 EMERGENCY DEPT VISIT SF MDM: CPT

## 2022-06-25 ASSESSMENT — PAIN DESCRIPTION - ONSET: ONSET: ON-GOING

## 2022-06-25 ASSESSMENT — PAIN DESCRIPTION - LOCATION: LOCATION: LEG

## 2022-06-25 ASSESSMENT — PAIN - FUNCTIONAL ASSESSMENT
PAIN_FUNCTIONAL_ASSESSMENT: 0-10
PAIN_FUNCTIONAL_ASSESSMENT: ACTIVITIES ARE NOT PREVENTED

## 2022-06-25 ASSESSMENT — PAIN DESCRIPTION - ORIENTATION: ORIENTATION: RIGHT

## 2022-06-25 ASSESSMENT — ENCOUNTER SYMPTOMS
VOMITING: 0
DIARRHEA: 0

## 2022-06-25 ASSESSMENT — PAIN DESCRIPTION - PAIN TYPE: TYPE: ACUTE PAIN

## 2022-06-25 ASSESSMENT — PAIN SCALES - GENERAL: PAINLEVEL_OUTOF10: 2

## 2022-06-25 ASSESSMENT — PAIN DESCRIPTION - FREQUENCY: FREQUENCY: INTERMITTENT

## 2022-06-25 ASSESSMENT — PAIN DESCRIPTION - DESCRIPTORS: DESCRIPTORS: ACHING

## 2022-06-25 NOTE — ED TRIAGE NOTES
Pt states that for the past few days he has experienced right lower leg pain and is thinking it may be a blood clot due to hx of a blood clot in the right lower leg. Denies warmth or redness.

## 2022-06-25 NOTE — ED PROVIDER NOTES
[facility] Emergency Department Provider Note                   PCP:                David Andre DO               Age: 66 y.o. Sex: male       ICD-10-CM    1. Right leg pain  M79.604    2. Pain of right leg  M79.604 Vascular duplex lower extremity venous right     Vascular duplex lower extremity venous right       DISPOSITION Decision To Discharge 06/25/2022 03:20:40 PM       New Prescriptions    No medications on file       No orders of the defined types were placed in this encounter. Mia Serrano is a 66 y.o. male who presents to the Emergency Department with chief complaint of    Chief Complaint   Patient presents with    Leg Pain      Patient is a 68-year-old male with a history of osteoarthritis, neck kidney stones and BPH who presents with right calf pain for the past couple days. He describes it as an aching sharp pain in the proximal aspect of his right calf. He denies any trauma or obvious precipitating event. He denies any swelling or redness. He has had similar pain in the past with DVTs. He got concerned about another DVT, states he will be driving back to [place] soon and thus came here for evaluation. Review of Systems   Constitutional: Negative for chills and fever. Gastrointestinal: Negative for diarrhea and vomiting. All other systems reviewed and are negative. All other systems reviewed and are negative.       Past Medical History:   Diagnosis Date    Allergies     environmental    Arthritis     osteo    Bilateral primary osteoarthritis of knee 3/3/2021    Calculus of kidney     Cataracts, bilateral     Headache     History of elevated PSA     BPH. stable w/meds    Sleep apnea     sleep study performed, pt could not tolerate Cpap    Trigeminal neuralgia 2014    right side of face/head        Past Surgical History:   Procedure Laterality Date    CATARACT REMOVAL Bilateral     COLONOSCOPY      HEENT      ORTHOPEDIC SURGERY Right 2012, 2014 meniscus repair     PROSTATE SURGERY  2015    prostate biopsy     TONSILLECTOMY AND ADENOIDECTOMY          Family History   Problem Relation Age of Onset    Heart Disease Mother         CAD, CABG     Cancer Mother [de-identified]        colon     Diabetes Mother     Elevated Lipids Mother     Kidney Disease Mother     Heart Failure Mother     Diabetes Father     Glaucoma Father     Cancer Sister         liver     Diabetes Brother     Psychiatric Disorder Brother     Suicide Brother     Down Syndrome Brother     Heart Disease Maternal Grandmother 79        MI     Heart Disease Maternal Grandfather 79        MI     Stroke Paternal Grandmother     Heart Disease Paternal Grandfather 48        MI            Social Connections:     Frequency of Communication with Friends and Family: Not on file    Frequency of Social Gatherings with Friends and Family: Not on file    Attends Jewish Services: Not on file    Active Member of Clubs or Organizations: Not on file    Attends Club or Organization Meetings: Not on file    Marital Status: Not on file        No Known Allergies     Vitals signs and nursing note reviewed. Patient Vitals for the past 4 hrs:   Temp Pulse Resp BP SpO2   06/25/22 1154 97.8 °F (36.6 °C) 70 17 (!) 146/77 96 %          Physical Exam  Vitals and nursing note reviewed. Constitutional:       General: He is not in acute distress. Appearance: Normal appearance. HENT:      Head: Normocephalic and atraumatic. Eyes:      General:         Right eye: No discharge. Left eye: No discharge. Conjunctiva/sclera: Conjunctivae normal.   Cardiovascular:      Rate and Rhythm: Normal rate and regular rhythm. Pulmonary:      Effort: Pulmonary effort is normal. No respiratory distress. Abdominal:      General: There is no distension. Palpations: Abdomen is soft. Tenderness: There is no abdominal tenderness. Musculoskeletal:         General: No swelling or tenderness. Right lower leg: No edema. Left lower leg: No edema. Skin:     General: Skin is warm. Neurological:      Mental Status: He is alert. Psychiatric:         Mood and Affect: Mood normal.         Behavior: Behavior normal.          MDM  Number of Diagnoses or Management Options  Right leg pain: new, no workup  Diagnosis management comments: 3:21 PM discussed results with patient, no DVT seen. He does have a clot in his peroneal veins below the knee but no DVT above the knee. Amount and/or Complexity of Data Reviewed  Tests in the radiology section of CPT®: ordered and reviewed    Risk of Complications, Morbidity, and/or Mortality  Presenting problems: moderate  Diagnostic procedures: moderate  Management options: moderate    Patient Progress  Patient progress: stable      Procedures    Labs Reviewed - No data to display     Vascular duplex lower extremity venous right   Final Result   1. No evidence of above-knee deep venous thrombosis in the right lower   extremity   2. 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 Chelsie Coma Scale  Eye Opening: Spontaneous  Best Verbal Response: Oriented  Best Motor Response: Obeys commands  Chelsie Coma Scale Score: 15                     Voice dictation software was used during the making of this note. This software is not perfect and grammatical and other typographical errors may be present. This note has not been completely proofread for errors.        Jesus Casillas MD  06/25/22 3932

## 2023-06-01 DIAGNOSIS — E03.8 SUBCLINICAL HYPOTHYROIDISM: ICD-10-CM

## 2023-06-01 DIAGNOSIS — R39.12 BENIGN PROSTATIC HYPERPLASIA WITH WEAK URINARY STREAM: ICD-10-CM

## 2023-06-01 DIAGNOSIS — R73.03 PREDIABETES: ICD-10-CM

## 2023-06-01 DIAGNOSIS — E78.00 PURE HYPERCHOLESTEROLEMIA: ICD-10-CM

## 2023-06-01 DIAGNOSIS — N40.1 BENIGN PROSTATIC HYPERPLASIA WITH WEAK URINARY STREAM: ICD-10-CM

## 2023-06-01 LAB
ALBUMIN SERPL-MCNC: 3.4 G/DL (ref 3.2–4.6)
ALBUMIN/GLOB SERPL: 1.2 (ref 0.4–1.6)
ALP SERPL-CCNC: 60 U/L (ref 50–136)
ALT SERPL-CCNC: 22 U/L (ref 12–65)
ANION GAP SERPL CALC-SCNC: 5 MMOL/L (ref 2–11)
AST SERPL-CCNC: 13 U/L (ref 15–37)
BASOPHILS # BLD: 0 K/UL (ref 0–0.2)
BASOPHILS NFR BLD: 1 % (ref 0–2)
BILIRUB SERPL-MCNC: 0.4 MG/DL (ref 0.2–1.1)
BUN SERPL-MCNC: 17 MG/DL (ref 8–23)
CALCIUM SERPL-MCNC: 8.4 MG/DL (ref 8.3–10.4)
CHLORIDE SERPL-SCNC: 109 MMOL/L (ref 101–110)
CHOLEST SERPL-MCNC: 153 MG/DL
CO2 SERPL-SCNC: 27 MMOL/L (ref 21–32)
CREAT SERPL-MCNC: 0.9 MG/DL (ref 0.8–1.5)
DIFFERENTIAL METHOD BLD: NORMAL
EOSINOPHIL # BLD: 0.1 K/UL (ref 0–0.8)
EOSINOPHIL NFR BLD: 2 % (ref 0.5–7.8)
ERYTHROCYTE [DISTWIDTH] IN BLOOD BY AUTOMATED COUNT: 13.4 % (ref 11.9–14.6)
GLOBULIN SER CALC-MCNC: 2.9 G/DL (ref 2.8–4.5)
GLUCOSE SERPL-MCNC: 101 MG/DL (ref 65–100)
HCT VFR BLD AUTO: 44.9 % (ref 41.1–50.3)
HDLC SERPL-MCNC: 51 MG/DL (ref 40–60)
HDLC SERPL: 3
HGB BLD-MCNC: 14.6 G/DL (ref 13.6–17.2)
IMM GRANULOCYTES # BLD AUTO: 0 K/UL (ref 0–0.5)
IMM GRANULOCYTES NFR BLD AUTO: 0 % (ref 0–5)
LDLC SERPL CALC-MCNC: 89 MG/DL
LYMPHOCYTES # BLD: 2.1 K/UL (ref 0.5–4.6)
LYMPHOCYTES NFR BLD: 29 % (ref 13–44)
MCH RBC QN AUTO: 29.3 PG (ref 26.1–32.9)
MCHC RBC AUTO-ENTMCNC: 32.5 G/DL (ref 31.4–35)
MCV RBC AUTO: 90.2 FL (ref 82–102)
MONOCYTES # BLD: 0.6 K/UL (ref 0.1–1.3)
MONOCYTES NFR BLD: 8 % (ref 4–12)
NEUTS SEG # BLD: 4.4 K/UL (ref 1.7–8.2)
NEUTS SEG NFR BLD: 60 % (ref 43–78)
NRBC # BLD: 0 K/UL (ref 0–0.2)
PLATELET # BLD AUTO: 240 K/UL (ref 150–450)
PMV BLD AUTO: 10.3 FL (ref 9.4–12.3)
POTASSIUM SERPL-SCNC: 4.5 MMOL/L (ref 3.5–5.1)
PROT SERPL-MCNC: 6.3 G/DL (ref 6.3–8.2)
PSA SERPL-MCNC: 7.9 NG/ML
RBC # BLD AUTO: 4.98 M/UL (ref 4.23–5.6)
SODIUM SERPL-SCNC: 141 MMOL/L (ref 133–143)
TRIGL SERPL-MCNC: 65 MG/DL (ref 35–150)
TSH W FREE THYROID IF ABNORMAL: 3.55 UIU/ML (ref 0.36–3.74)
VLDLC SERPL CALC-MCNC: 13 MG/DL (ref 6–23)
WBC # BLD AUTO: 7.2 K/UL (ref 4.3–11.1)

## 2023-06-02 LAB
EST. AVERAGE GLUCOSE BLD GHB EST-MCNC: 123 MG/DL
HBA1C MFR BLD: 5.9 % (ref 4.8–5.6)

## 2023-06-03 LAB
TESTOST FREE SERPL-MCNC: 5.6 PG/ML (ref 6.6–18.1)
TESTOST SERPL-MCNC: 301 NG/DL (ref 264–916)

## 2023-06-06 ENCOUNTER — OFFICE VISIT (OUTPATIENT)
Dept: INTERNAL MEDICINE CLINIC | Facility: CLINIC | Age: 73
End: 2023-06-06
Payer: MEDICARE

## 2023-06-06 VITALS
TEMPERATURE: 97.1 F | HEART RATE: 77 BPM | SYSTOLIC BLOOD PRESSURE: 124 MMHG | BODY MASS INDEX: 30.65 KG/M2 | WEIGHT: 202.25 LBS | OXYGEN SATURATION: 96 % | HEIGHT: 68 IN | DIASTOLIC BLOOD PRESSURE: 84 MMHG

## 2023-06-06 DIAGNOSIS — R73.03 PREDIABETES: ICD-10-CM

## 2023-06-06 DIAGNOSIS — G89.29 CHRONIC BILATERAL LOW BACK PAIN WITHOUT SCIATICA: ICD-10-CM

## 2023-06-06 DIAGNOSIS — N40.1 BENIGN PROSTATIC HYPERPLASIA WITH WEAK URINARY STREAM: ICD-10-CM

## 2023-06-06 DIAGNOSIS — F32.A DEPRESSION, UNSPECIFIED DEPRESSION TYPE: ICD-10-CM

## 2023-06-06 DIAGNOSIS — R13.19 ESOPHAGEAL DYSPHAGIA: ICD-10-CM

## 2023-06-06 DIAGNOSIS — R97.20 ELEVATED PSA MEASUREMENT: ICD-10-CM

## 2023-06-06 DIAGNOSIS — Z00.00 MEDICARE ANNUAL WELLNESS VISIT, SUBSEQUENT: Primary | ICD-10-CM

## 2023-06-06 DIAGNOSIS — R39.12 BENIGN PROSTATIC HYPERPLASIA WITH WEAK URINARY STREAM: ICD-10-CM

## 2023-06-06 DIAGNOSIS — Z86.718 HISTORY OF RECURRENT DEEP VEIN THROMBOSIS (DVT): ICD-10-CM

## 2023-06-06 DIAGNOSIS — G50.0 TRIGEMINAL NEURALGIA: ICD-10-CM

## 2023-06-06 DIAGNOSIS — M54.50 CHRONIC BILATERAL LOW BACK PAIN WITHOUT SCIATICA: ICD-10-CM

## 2023-06-06 DIAGNOSIS — R42 LIGHTHEADEDNESS: ICD-10-CM

## 2023-06-06 DIAGNOSIS — M19.041 PRIMARY OSTEOARTHRITIS OF RIGHT HAND: ICD-10-CM

## 2023-06-06 DIAGNOSIS — Z23 ENCOUNTER FOR IMMUNIZATION: ICD-10-CM

## 2023-06-06 DIAGNOSIS — Z12.83 SKIN CANCER SCREENING: ICD-10-CM

## 2023-06-06 PROCEDURE — G8417 CALC BMI ABV UP PARAM F/U: HCPCS | Performed by: STUDENT IN AN ORGANIZED HEALTH CARE EDUCATION/TRAINING PROGRAM

## 2023-06-06 PROCEDURE — G0439 PPPS, SUBSEQ VISIT: HCPCS | Performed by: STUDENT IN AN ORGANIZED HEALTH CARE EDUCATION/TRAINING PROGRAM

## 2023-06-06 PROCEDURE — 1036F TOBACCO NON-USER: CPT | Performed by: STUDENT IN AN ORGANIZED HEALTH CARE EDUCATION/TRAINING PROGRAM

## 2023-06-06 PROCEDURE — G8427 DOCREV CUR MEDS BY ELIG CLIN: HCPCS | Performed by: STUDENT IN AN ORGANIZED HEALTH CARE EDUCATION/TRAINING PROGRAM

## 2023-06-06 PROCEDURE — 1123F ACP DISCUSS/DSCN MKR DOCD: CPT | Performed by: STUDENT IN AN ORGANIZED HEALTH CARE EDUCATION/TRAINING PROGRAM

## 2023-06-06 PROCEDURE — 99214 OFFICE O/P EST MOD 30 MIN: CPT | Performed by: STUDENT IN AN ORGANIZED HEALTH CARE EDUCATION/TRAINING PROGRAM

## 2023-06-06 PROCEDURE — 3017F COLORECTAL CA SCREEN DOC REV: CPT | Performed by: STUDENT IN AN ORGANIZED HEALTH CARE EDUCATION/TRAINING PROGRAM

## 2023-06-06 RX ORDER — METHYLPREDNISOLONE 4 MG/1
TABLET ORAL
Qty: 1 KIT | Refills: 0 | Status: SHIPPED | OUTPATIENT
Start: 2023-06-06 | End: 2023-06-12

## 2023-06-06 RX ORDER — ZOSTER VACCINE RECOMBINANT, ADJUVANTED 50 MCG/0.5
0.5 KIT INTRAMUSCULAR ONCE
Qty: 0.5 ML | Refills: 0 | Status: SHIPPED | OUTPATIENT
Start: 2023-06-06 | End: 2023-06-06

## 2023-06-06 SDOH — ECONOMIC STABILITY: INCOME INSECURITY: HOW HARD IS IT FOR YOU TO PAY FOR THE VERY BASICS LIKE FOOD, HOUSING, MEDICAL CARE, AND HEATING?: NOT HARD AT ALL

## 2023-06-06 SDOH — ECONOMIC STABILITY: HOUSING INSECURITY
IN THE LAST 12 MONTHS, WAS THERE A TIME WHEN YOU DID NOT HAVE A STEADY PLACE TO SLEEP OR SLEPT IN A SHELTER (INCLUDING NOW)?: NO

## 2023-06-06 SDOH — ECONOMIC STABILITY: FOOD INSECURITY: WITHIN THE PAST 12 MONTHS, YOU WORRIED THAT YOUR FOOD WOULD RUN OUT BEFORE YOU GOT MONEY TO BUY MORE.: NEVER TRUE

## 2023-06-06 SDOH — ECONOMIC STABILITY: FOOD INSECURITY: WITHIN THE PAST 12 MONTHS, THE FOOD YOU BOUGHT JUST DIDN'T LAST AND YOU DIDN'T HAVE MONEY TO GET MORE.: NEVER TRUE

## 2023-06-06 ASSESSMENT — ENCOUNTER SYMPTOMS
SHORTNESS OF BREATH: 0
BACK PAIN: 1
TROUBLE SWALLOWING: 1

## 2023-06-06 ASSESSMENT — PATIENT HEALTH QUESTIONNAIRE - PHQ9
SUM OF ALL RESPONSES TO PHQ QUESTIONS 1-9: 1
1. LITTLE INTEREST OR PLEASURE IN DOING THINGS: 0
SUM OF ALL RESPONSES TO PHQ9 QUESTIONS 1 & 2: 1
2. FEELING DOWN, DEPRESSED OR HOPELESS: 1
SUM OF ALL RESPONSES TO PHQ QUESTIONS 1-9: 1

## 2023-06-06 ASSESSMENT — LIFESTYLE VARIABLES
HOW OFTEN DO YOU HAVE A DRINK CONTAINING ALCOHOL: 2-4 TIMES A MONTH
HOW MANY STANDARD DRINKS CONTAINING ALCOHOL DO YOU HAVE ON A TYPICAL DAY: 1 OR 2

## 2024-07-12 DIAGNOSIS — R73.03 PREDIABETES: ICD-10-CM

## 2024-07-12 DIAGNOSIS — E78.00 PURE HYPERCHOLESTEROLEMIA: ICD-10-CM

## 2024-07-12 DIAGNOSIS — N40.1 BENIGN PROSTATIC HYPERPLASIA WITH WEAK URINARY STREAM: Primary | ICD-10-CM

## 2024-07-12 DIAGNOSIS — E03.8 SUBCLINICAL HYPOTHYROIDISM: ICD-10-CM

## 2024-07-12 DIAGNOSIS — R39.12 BENIGN PROSTATIC HYPERPLASIA WITH WEAK URINARY STREAM: Primary | ICD-10-CM

## 2024-07-18 ENCOUNTER — LAB (OUTPATIENT)
Dept: INTERNAL MEDICINE CLINIC | Facility: CLINIC | Age: 74
End: 2024-07-18

## 2024-07-18 DIAGNOSIS — E03.8 SUBCLINICAL HYPOTHYROIDISM: ICD-10-CM

## 2024-07-18 DIAGNOSIS — R39.12 BENIGN PROSTATIC HYPERPLASIA WITH WEAK URINARY STREAM: ICD-10-CM

## 2024-07-18 DIAGNOSIS — N40.1 BENIGN PROSTATIC HYPERPLASIA WITH WEAK URINARY STREAM: ICD-10-CM

## 2024-07-18 DIAGNOSIS — E78.00 PURE HYPERCHOLESTEROLEMIA: ICD-10-CM

## 2024-07-18 DIAGNOSIS — R73.03 PREDIABETES: ICD-10-CM

## 2024-07-18 LAB
ALBUMIN SERPL-MCNC: 3.5 G/DL (ref 3.2–4.6)
ALBUMIN/GLOB SERPL: 1.2 (ref 1–1.9)
ALP SERPL-CCNC: 63 U/L (ref 40–129)
ALT SERPL-CCNC: 10 U/L (ref 12–65)
ANION GAP SERPL CALC-SCNC: 10 MMOL/L (ref 9–18)
AST SERPL-CCNC: 15 U/L (ref 15–37)
BASOPHILS # BLD: 0 K/UL (ref 0–0.2)
BASOPHILS NFR BLD: 1 % (ref 0–2)
BILIRUB SERPL-MCNC: 0.3 MG/DL (ref 0–1.2)
BUN SERPL-MCNC: 26 MG/DL (ref 8–23)
CALCIUM SERPL-MCNC: 9 MG/DL (ref 8.8–10.2)
CHLORIDE SERPL-SCNC: 109 MMOL/L (ref 98–107)
CHOLEST SERPL-MCNC: 170 MG/DL (ref 0–200)
CO2 SERPL-SCNC: 24 MMOL/L (ref 20–28)
CREAT SERPL-MCNC: 0.96 MG/DL (ref 0.8–1.3)
DIFFERENTIAL METHOD BLD: ABNORMAL
EOSINOPHIL # BLD: 0.1 K/UL (ref 0–0.8)
EOSINOPHIL NFR BLD: 2 % (ref 0.5–7.8)
ERYTHROCYTE [DISTWIDTH] IN BLOOD BY AUTOMATED COUNT: 15.9 % (ref 11.9–14.6)
EST. AVERAGE GLUCOSE BLD GHB EST-MCNC: 139 MG/DL
GLOBULIN SER CALC-MCNC: 2.9 G/DL (ref 2.3–3.5)
GLUCOSE SERPL-MCNC: 111 MG/DL (ref 70–99)
HBA1C MFR BLD: 6.5 % (ref 0–5.6)
HCT VFR BLD AUTO: 44.4 % (ref 41.1–50.3)
HDLC SERPL-MCNC: 54 MG/DL (ref 40–60)
HDLC SERPL: 3.1 (ref 0–5)
HGB BLD-MCNC: 14 G/DL (ref 13.6–17.2)
IMM GRANULOCYTES # BLD AUTO: 0 K/UL (ref 0–0.5)
IMM GRANULOCYTES NFR BLD AUTO: 0 % (ref 0–5)
LDLC SERPL CALC-MCNC: 103 MG/DL (ref 0–100)
LYMPHOCYTES # BLD: 2 K/UL (ref 0.5–4.6)
LYMPHOCYTES NFR BLD: 31 % (ref 13–44)
MCH RBC QN AUTO: 26.4 PG (ref 26.1–32.9)
MCHC RBC AUTO-ENTMCNC: 31.5 G/DL (ref 31.4–35)
MCV RBC AUTO: 83.6 FL (ref 82–102)
MONOCYTES # BLD: 0.6 K/UL (ref 0.1–1.3)
MONOCYTES NFR BLD: 10 % (ref 4–12)
NEUTS SEG # BLD: 3.6 K/UL (ref 1.7–8.2)
NEUTS SEG NFR BLD: 56 % (ref 43–78)
NRBC # BLD: 0 K/UL (ref 0–0.2)
PLATELET # BLD AUTO: 246 K/UL (ref 150–450)
PMV BLD AUTO: 10.8 FL (ref 9.4–12.3)
POTASSIUM SERPL-SCNC: 4.4 MMOL/L (ref 3.5–5.1)
PROT SERPL-MCNC: 6.4 G/DL (ref 6.3–8.2)
PSA SERPL-MCNC: 6.3 NG/ML (ref 0–4)
RBC # BLD AUTO: 5.31 M/UL (ref 4.23–5.6)
SODIUM SERPL-SCNC: 143 MMOL/L (ref 136–145)
T4 FREE SERPL-MCNC: 1.1 NG/DL (ref 0.9–1.7)
TRIGL SERPL-MCNC: 62 MG/DL (ref 0–150)
TSH, 3RD GENERATION: 4.25 UIU/ML (ref 0.27–4.2)
VLDLC SERPL CALC-MCNC: 12 MG/DL (ref 6–23)
WBC # BLD AUTO: 6.4 K/UL (ref 4.3–11.1)

## 2024-07-21 NOTE — PROGRESS NOTES
RVSF  -no interatrial shunt  -Severely dilated ascneding aorta at 5.1cm, dilated aortic arch  S/p pulmonary artery catheter directed aspiration thrombectomy on 2/20/24  Discharged on therapeutic lovenox. Subsequently transitioned to eliquis on 5/13/24  Given patient with multiple VTE (both DVT and PE) as well as most recently with saddle PE, suspect patient will require lifelong anticoagulation to minimize risk of recurrence.   New referral to in-state hematology placed     - [facility]    5. Neuropathy  EMG/NCV as above  Patient relatively asymptomatic, does not desire surgical intervention for carpal tunnel syndrome at this time, monitor     6. Pulmonary nodule  CTA chest on 2/20/24 with 12mm subpleural LLL nodule  Recommendations per radiology were for nonemergent characterization with PET/CT  Remote occasional cigar use   Referral to pulmonology placed for further evaluation     - [facility] [place] [facility]    7. Aneurysm of ascending aorta without rupture (HCC)  Echo on 2/20/24:  -Severely dilated ascneding aorta at 5.1cm, dilated aortic arch  Surprisingly no mention of this made on CTA chest done on the same day   Refer to cardiology for additional evaluation and surveillance     - [facility] [place]    8. Benign prostatic hyperplasia with weak urinary stream  9. Elevated psa   History of elevated psa, most recently 6.3 on 7/18/24  Prior intolerance to finasteride with decreased libido  S/p TURP since last seen (by urologist in [place] in 2/2024)  S/p cystoscopy on 2/27/24 for hematuria (clot removed and fulgurated and vaporized prostate to stop bleeding)  Not currently on any medication. Per patient most recent psa is down from prior (last reportedly in the 7's per patient)  Consider urology referral in the future     10. Impaired fasting glucose  A1c 6.5% on 7/18/24  Counseled on lifestyle modifications, hold off regular home blood sugar

## 2024-07-25 ENCOUNTER — OFFICE VISIT (OUTPATIENT)
Dept: INTERNAL MEDICINE CLINIC | Facility: CLINIC | Age: 74
End: 2024-07-25

## 2024-07-25 ENCOUNTER — TELEPHONE (OUTPATIENT)
Dept: ONCOLOGY | Age: 74
End: 2024-07-25

## 2024-07-25 VITALS
BODY MASS INDEX: 30.92 KG/M2 | SYSTOLIC BLOOD PRESSURE: 128 MMHG | OXYGEN SATURATION: 97 % | HEIGHT: 68 IN | WEIGHT: 204 LBS | HEART RATE: 66 BPM | DIASTOLIC BLOOD PRESSURE: 84 MMHG | TEMPERATURE: 98.2 F

## 2024-07-25 DIAGNOSIS — G50.0 TRIGEMINAL NEURALGIA: ICD-10-CM

## 2024-07-25 DIAGNOSIS — I71.21 ANEURYSM OF ASCENDING AORTA WITHOUT RUPTURE (HCC): ICD-10-CM

## 2024-07-25 DIAGNOSIS — Z79.01 CURRENT USE OF LONG TERM ANTICOAGULATION: ICD-10-CM

## 2024-07-25 DIAGNOSIS — R73.01 IMPAIRED FASTING GLUCOSE: ICD-10-CM

## 2024-07-25 DIAGNOSIS — I26.92 ACUTE SADDLE PULMONARY EMBOLISM, UNSPECIFIED WHETHER ACUTE COR PULMONALE PRESENT (HCC): ICD-10-CM

## 2024-07-25 DIAGNOSIS — R91.1 PULMONARY NODULE: ICD-10-CM

## 2024-07-25 DIAGNOSIS — G20.C PARKINSONISM, UNSPECIFIED PARKINSONISM TYPE (HCC): Primary | ICD-10-CM

## 2024-07-25 DIAGNOSIS — R39.12 BENIGN PROSTATIC HYPERPLASIA WITH WEAK URINARY STREAM: ICD-10-CM

## 2024-07-25 DIAGNOSIS — Z86.718 HISTORY OF RECURRENT DEEP VEIN THROMBOSIS (DVT): ICD-10-CM

## 2024-07-25 DIAGNOSIS — R97.20 ELEVATED PSA: ICD-10-CM

## 2024-07-25 DIAGNOSIS — N40.1 BENIGN PROSTATIC HYPERPLASIA WITH WEAK URINARY STREAM: ICD-10-CM

## 2024-07-25 DIAGNOSIS — G62.9 NEUROPATHY: ICD-10-CM

## 2024-07-25 RX ORDER — OXYBUTYNIN CHLORIDE 2.5 MG/1
TABLET ORAL
COMMUNITY
Start: 2024-03-13 | End: 2024-07-25

## 2024-07-25 RX ORDER — CARBAMAZEPINE 100 MG/1
100 TABLET, EXTENDED RELEASE ORAL 2 TIMES DAILY
COMMUNITY
End: 2024-07-25

## 2024-07-25 RX ORDER — SENNOSIDES A AND B 8.6 MG/1
1 TABLET, FILM COATED ORAL 4 TIMES DAILY PRN
COMMUNITY

## 2024-07-25 RX ORDER — PREGABALIN 100 MG/1
100 CAPSULE ORAL 3 TIMES DAILY
Qty: 90 CAPSULE | Refills: 1 | Status: SHIPPED | OUTPATIENT
Start: 2024-07-25 | End: 2024-09-23

## 2024-07-25 RX ORDER — GABAPENTIN 300 MG/1
900 CAPSULE ORAL 2 TIMES DAILY
COMMUNITY
Start: 2024-03-22 | End: 2024-07-25 | Stop reason: ALTCHOICE

## 2024-07-25 RX ORDER — ENOXAPARIN SODIUM 100 MG/ML
1 INJECTION SUBCUTANEOUS
COMMUNITY
Start: 2024-02-22 | End: 2024-07-25

## 2024-07-25 RX ORDER — APIXABAN 5 MG/1
5 TABLET, FILM COATED ORAL 2 TIMES DAILY
COMMUNITY
Start: 2024-07-09

## 2024-07-25 RX ORDER — OXYBUTYNIN CHLORIDE 5 MG/1
5 TABLET ORAL AS NEEDED
COMMUNITY
End: 2024-07-25

## 2024-07-25 ASSESSMENT — ENCOUNTER SYMPTOMS
ABDOMINAL PAIN: 0
ANAL BLEEDING: 0
BLOOD IN STOOL: 0
BACK PAIN: 1
CONSTIPATION: 1
SHORTNESS OF BREATH: 0

## 2024-07-31 ENCOUNTER — TELEPHONE (OUTPATIENT)
Dept: PULMONOLOGY | Age: 74
End: 2024-07-31

## 2024-07-31 NOTE — TELEPHONE ENCOUNTER
Note:  Ref by Dr. Cecilio Kimble for Pulmonary nodule. CTA chest PE 4/26/24 IMPRESSION:   Subtle irregularity involving the distal subsegmental branches bilaterally, likely representing sequela of prior known large bilateral pulmonary emboli. No large central pulmonary emboli. No evidence of right heart strain.   Redemonstration of soft tissue nodule involving the left posterior lower lobe, measuring 1.1 x 1.3 cm, unchanged.   Ascending aortic aneurysm measuring 4.9 cm in the greatest dimension.      Imaging being requested from [facility]. CTA PE 4/26/24 2/20/24 CXR 2/20/24    Report from 2/20/2024 CT chest-12 mm subpleural left lower lobe nodule     4/26/2024 CT chest report-Redemonstration of soft tissue nodule involving the left posterior lower lobe, measuring 1.1 x 1.3 cm, unchanged.     Dr Persaud, office has not yet received images from [facility], please advise if this office needs additional image prior to appointment with last CT chest 4/26/2024?

## 2024-08-09 ENCOUNTER — OFFICE VISIT (OUTPATIENT)
Dept: PULMONOLOGY | Age: 74
End: 2024-08-09
Payer: MEDICARE

## 2024-08-09 VITALS
DIASTOLIC BLOOD PRESSURE: 90 MMHG | HEIGHT: 68 IN | OXYGEN SATURATION: 97 % | BODY MASS INDEX: 30.77 KG/M2 | SYSTOLIC BLOOD PRESSURE: 147 MMHG | HEART RATE: 58 BPM | WEIGHT: 203 LBS | RESPIRATION RATE: 14 BRPM

## 2024-08-09 DIAGNOSIS — R91.1 PULMONARY NODULE: Primary | ICD-10-CM

## 2024-08-09 DIAGNOSIS — Z86.711 HISTORY OF PULMONARY EMBOLISM: ICD-10-CM

## 2024-08-09 LAB
EXPIRATORY TIME: NORMAL
FEF 25-75% %PRED-PRE: NORMAL
FEF 25-75% PRED: NORMAL
FEF 25-75-PRE: NORMAL
FEV1 %PRED-PRE: 84 %
FEV1 PRED: NORMAL
FEV1/FVC %PRED-PRE: NORMAL
FEV1/FVC PRED: NORMAL
FEV1/FVC: 78 %
FEV1: 2.38 L
FVC %PRED-PRE: 81 %
FVC PRED: NORMAL
FVC: 3.04 L
PEF %PRED-PRE: NORMAL
PEF PRED: NORMAL
PEF-PRE: NORMAL

## 2024-08-09 PROCEDURE — G8417 CALC BMI ABV UP PARAM F/U: HCPCS | Performed by: INTERNAL MEDICINE

## 2024-08-09 PROCEDURE — 3017F COLORECTAL CA SCREEN DOC REV: CPT | Performed by: INTERNAL MEDICINE

## 2024-08-09 PROCEDURE — G8427 DOCREV CUR MEDS BY ELIG CLIN: HCPCS | Performed by: INTERNAL MEDICINE

## 2024-08-09 PROCEDURE — 1123F ACP DISCUSS/DSCN MKR DOCD: CPT | Performed by: INTERNAL MEDICINE

## 2024-08-09 PROCEDURE — 99204 OFFICE O/P NEW MOD 45 MIN: CPT | Performed by: INTERNAL MEDICINE

## 2024-08-09 PROCEDURE — 94010 BREATHING CAPACITY TEST: CPT | Performed by: INTERNAL MEDICINE

## 2024-08-09 PROCEDURE — 1036F TOBACCO NON-USER: CPT | Performed by: INTERNAL MEDICINE

## 2024-08-09 ASSESSMENT — PULMONARY FUNCTION TESTS
FEV1_PERCENT_PREDICTED_PRE: 84
FEV1/FVC: 78
FVC: 3.04
FVC_PERCENT_PREDICTED_PRE: 81
FEV1: 2.38

## 2024-08-09 NOTE — PROGRESS NOTES
2008, 2017    Zoster Live (Zostavax) 2008    Zoster Recombinant (Shingrix) 2023     Past Medical History:   Diagnosis Date    Allergies     environmental    Arthritis     osteo    Bilateral primary osteoarthritis of knee 3/3/2021    Calculus of kidney     Cataracts, bilateral     Headache     History of elevated PSA     BPH. stable w/meds    Parkinson's disease (HCC)     Pulmonary embolism (HCC) 2024    Sleep apnea     sleep study performed, pt could not tolerate Cpap    Trigeminal neuralgia     right side of face/head        Tobacco Use      Smoking status: Former        Types: Cigars, Pipe        Start date:         Quit date:         Years since quittin.6        Passive exposure: Never      Smokeless tobacco: Never      Tobacco comments: Quit smoking: former occasinal cigar AND PIPE use; last use 10 yrs    No Known Allergies  Current Outpatient Medications   Medication Instructions    carbidopa-levodopa (SINEMET)  MG per tablet Take 0.5 tablets by mouth in the morning and 0.5 tablets in the evening.    ELIQUIS 5 MG TABS tablet Take 1 tablet by mouth in the morning and 1 tablet in the evening.    loratadine (CLARITIN) 10 mg, Oral, DAILY PRN    pregabalin (LYRICA) 100 mg, Oral, 3 TIMES DAILY, TAKE IN PLACE OF GABAPENTIN. DO NOT STOP MEDICATION WITHOUT FIRST TALKING TO YOUR DOCTOR. DO NOT DRINK ALCOHOL, DRIVE OR OPERATE HEAVY MACHINERY AFTER USE OF THIS MEDICATION AS IT MAY CAUSE DROWSINESS.    senna (SENOKOT) 8.6 MG tablet 1 tablet, Oral, 4 TIMES DAILY PRN    tamsulosin (FLOMAX) 0.4 mg, 2 TIMES DAILY

## 2024-08-23 DIAGNOSIS — N40.1 BENIGN PROSTATIC HYPERPLASIA WITH LOWER URINARY TRACT SYMPTOMS, SYMPTOM DETAILS UNSPECIFIED: Primary | ICD-10-CM

## 2024-08-23 DIAGNOSIS — I82.629 DEEP VEIN THROMBOSIS (DVT) OF OTHER VEIN OF UPPER EXTREMITY, UNSPECIFIED CHRONICITY, UNSPECIFIED LATERALITY (HCC): ICD-10-CM

## 2024-08-26 ENCOUNTER — HOSPITAL ENCOUNTER (OUTPATIENT)
Dept: LAB | Age: 74
Discharge: HOME | End: 2024-08-29
Payer: MEDICARE

## 2024-08-26 ENCOUNTER — OFFICE VISIT (OUTPATIENT)
Dept: ONCOLOGY | Age: 74
End: 2024-08-26
Payer: MEDICARE

## 2024-08-26 VITALS
HEIGHT: 68 IN | DIASTOLIC BLOOD PRESSURE: 90 MMHG | RESPIRATION RATE: 16 BRPM | SYSTOLIC BLOOD PRESSURE: 130 MMHG | OXYGEN SATURATION: 99 % | BODY MASS INDEX: 31.05 KG/M2 | HEART RATE: 65 BPM | WEIGHT: 204.9 LBS | TEMPERATURE: 97.9 F

## 2024-08-26 DIAGNOSIS — I82.629 DEEP VEIN THROMBOSIS (DVT) OF OTHER VEIN OF UPPER EXTREMITY, UNSPECIFIED CHRONICITY, UNSPECIFIED LATERALITY (HCC): ICD-10-CM

## 2024-08-26 DIAGNOSIS — Z86.718 HISTORY OF DVT IN ADULTHOOD: Primary | ICD-10-CM

## 2024-08-26 LAB
ALBUMIN SERPL-MCNC: 3.5 G/DL (ref 3.2–4.6)
ALBUMIN/GLOB SERPL: 1.2 (ref 1–1.9)
ALP SERPL-CCNC: 62 U/L (ref 40–129)
ALT SERPL-CCNC: 14 U/L (ref 12–65)
ANION GAP SERPL CALC-SCNC: 10 MMOL/L (ref 9–18)
AST SERPL-CCNC: 17 U/L (ref 15–37)
BASOPHILS # BLD: 0 K/UL (ref 0–0.2)
BASOPHILS NFR BLD: 1 % (ref 0–2)
BILIRUB SERPL-MCNC: 0.4 MG/DL (ref 0–1.2)
BUN SERPL-MCNC: 19 MG/DL (ref 8–23)
CALCIUM SERPL-MCNC: 8.8 MG/DL (ref 8.8–10.2)
CHLORIDE SERPL-SCNC: 108 MMOL/L (ref 98–107)
CO2 SERPL-SCNC: 25 MMOL/L (ref 20–28)
CREAT SERPL-MCNC: 0.92 MG/DL (ref 0.8–1.3)
D DIMER PPP FEU-MCNC: 0.28 UG/ML(FEU)
DIFFERENTIAL METHOD BLD: ABNORMAL
EOSINOPHIL # BLD: 0.1 K/UL (ref 0–0.8)
EOSINOPHIL NFR BLD: 2 % (ref 0.5–7.8)
ERYTHROCYTE [DISTWIDTH] IN BLOOD BY AUTOMATED COUNT: 17.8 % (ref 11.9–14.6)
GLOBULIN SER CALC-MCNC: 3 G/DL (ref 2.3–3.5)
GLUCOSE SERPL-MCNC: 101 MG/DL (ref 70–99)
HCT VFR BLD AUTO: 44.3 % (ref 41.1–50.3)
HGB BLD-MCNC: 13.9 G/DL (ref 13.6–17.2)
IMM GRANULOCYTES # BLD AUTO: 0 K/UL (ref 0–0.5)
IMM GRANULOCYTES NFR BLD AUTO: 0 % (ref 0–5)
LYMPHOCYTES # BLD: 1.7 K/UL (ref 0.5–4.6)
LYMPHOCYTES NFR BLD: 26 % (ref 13–44)
MCH RBC QN AUTO: 26 PG (ref 26.1–32.9)
MCHC RBC AUTO-ENTMCNC: 31.4 G/DL (ref 31.4–35)
MCV RBC AUTO: 82.8 FL (ref 82–102)
MONOCYTES # BLD: 0.6 K/UL (ref 0.1–1.3)
MONOCYTES NFR BLD: 9 % (ref 4–12)
NEUTS SEG # BLD: 4.1 K/UL (ref 1.7–8.2)
NEUTS SEG NFR BLD: 62 % (ref 43–78)
NRBC # BLD: 0 K/UL (ref 0–0.2)
PLATELET # BLD AUTO: 263 K/UL (ref 150–450)
PMV BLD AUTO: 9.5 FL (ref 9.4–12.3)
POTASSIUM SERPL-SCNC: 4.1 MMOL/L (ref 3.5–5.1)
PROT SERPL-MCNC: 6.5 G/DL (ref 6.3–8.2)
RBC # BLD AUTO: 5.35 M/UL (ref 4.23–5.6)
SODIUM SERPL-SCNC: 143 MMOL/L (ref 136–145)
WBC # BLD AUTO: 6.5 K/UL (ref 4.3–11.1)

## 2024-08-26 PROCEDURE — 99214 OFFICE O/P EST MOD 30 MIN: CPT | Performed by: INTERNAL MEDICINE

## 2024-08-26 PROCEDURE — 3017F COLORECTAL CA SCREEN DOC REV: CPT | Performed by: INTERNAL MEDICINE

## 2024-08-26 PROCEDURE — G8428 CUR MEDS NOT DOCUMENT: HCPCS | Performed by: INTERNAL MEDICINE

## 2024-08-26 PROCEDURE — 85025 COMPLETE CBC W/AUTO DIFF WBC: CPT

## 2024-08-26 PROCEDURE — 85379 FIBRIN DEGRADATION QUANT: CPT

## 2024-08-26 PROCEDURE — G8417 CALC BMI ABV UP PARAM F/U: HCPCS | Performed by: INTERNAL MEDICINE

## 2024-08-26 PROCEDURE — 1036F TOBACCO NON-USER: CPT | Performed by: INTERNAL MEDICINE

## 2024-08-26 PROCEDURE — 80053 COMPREHEN METABOLIC PANEL: CPT

## 2024-08-26 PROCEDURE — 1123F ACP DISCUSS/DSCN MKR DOCD: CPT | Performed by: INTERNAL MEDICINE

## 2024-08-26 ASSESSMENT — PATIENT HEALTH QUESTIONNAIRE - PHQ9
SUM OF ALL RESPONSES TO PHQ QUESTIONS 1-9: 0
SUM OF ALL RESPONSES TO PHQ9 QUESTIONS 1 & 2: 0
1. LITTLE INTEREST OR PLEASURE IN DOING THINGS: NOT AT ALL
SUM OF ALL RESPONSES TO PHQ QUESTIONS 1-9: 0
2. FEELING DOWN, DEPRESSED OR HOPELESS: NOT AT ALL

## 2024-08-26 NOTE — PROGRESS NOTES
(Patient not taking: Reported on 2024)       No current facility-administered medications for this visit.       Social History     Socioeconomic History    Marital status:      Spouse name: None    Number of children: None    Years of education: None    Highest education level: None   Tobacco Use    Smoking status: Former     Types: Cigars, Pipe     Start date:      Quit date:      Years since quittin.6     Passive exposure: Never    Smokeless tobacco: Never    Tobacco comments:     Quit smoking: former occasinal cigar AND PIPE use; last use 10 yrs   Substance and Sexual Activity    Alcohol use: Yes     Alcohol/week: 4.0 standard drinks of alcohol    Drug use: Never     Social Determinants of Health     Financial Resource Strain: Low Risk  (2024)    Received from [facility], [facility] and formerly [facility]    Overall Financial Resource Strain (CARDIA)     Difficulty of Paying Living Expenses: Not hard at all   Food Insecurity: No Food Insecurity (2024)    Received from [facility], [facility]    Hunger Vital Sign     Worried About Running Out of Food in the Last Year: Never true     Ran Out of Food in the Last Year: Never true   Transportation Needs: No Transportation Needs (2024)    Received from [facility], [facility]    PRAPARE - Transportation     Lack of Transportation (Medical): No     Lack of Transportation (Non-Medical): No   Physical Activity: Inactive (2023)    Exercise Vital Sign     Days of Exercise per Week: 0 days     Minutes of Exercise per Session: 0 min   Social Connections: Unknown (3/20/2021)    Received from [facility], [facility]    Social Connections     Frequency of Communication with Friends and Family: Not asked     Frequency of Social Gatherings with Friends and Family: Not asked   Intimate Partner Violence: Unknown (2024)    Received from [facility], [facility] and  workup though doubt will : Will order prior to next visit.  Blood work today without anemia, and normal range D-dimer.  Recently PSA has been borderline high, follows with urology.  He will continue with apixaban indefinitely.  We will see him back in 4 months with labs, D-dimer, hypercoagulable workup, bilateral LE Dopplers.  Likely reduce his apixaban dose at that point.        1.  Recurrent thrombophilia (RLE distal DVT 3/21, and saddle PE 2/24)    PLAN:  - as above.  Continue apixaban at 5 mg twice daily  - Serial D dimer  - Compression stockings recommended   - Age-appropriate cancer screening through primary care.     RTC in 4 months with repeat labs, D-dimer, hypercoagulable workup, bilateral LE Dopplers    Thank you Dr Kimble for allowing us to paticipate in care of this pleasant patient. Please call w/ any quesions      Debra Garcia MD  [facility]  Hematology Oncology  [address] [place] [address]  Office : [phone]  Fax : [phone]

## 2024-08-26 NOTE — PATIENT INSTRUCTIONS
Patient Information from Today's Visit    The members of your Oncology Medical Home are listed below:    Physician Provider: Debra Garcia Medical Oncologist  Advanced Practice Clinician: Kathy Ocampo NP  Registered Nurse: Beverly GRIFFITH RN  Navigator: N/A  Medical Assistant: Sona GRIFFITH MA  : Sobeida DEL ANGEL   Supportive Care Services: Thelma BREWSTER LMSW    Diagnosis: DVT      Follow Up Instructions:   Reviewed labs  Continue to take your blood thinner  Labs one week prior to your next appt  Treatment Summary has been discussed and given to patient:N/A      Current Labs:   Hospital Outpatient Visit on 08/26/2024   Component Date Value Ref Range Status    WBC 08/26/2024 6.5  4.3 - 11.1 K/uL Final    RBC 08/26/2024 5.35  4.23 - 5.6 M/uL Final    Hemoglobin 08/26/2024 13.9  13.6 - 17.2 g/dL Final    Hematocrit 08/26/2024 44.3  41.1 - 50.3 % Final    MCV 08/26/2024 82.8  82.0 - 102.0 FL Final    MCH 08/26/2024 26.0 (L)  26.1 - 32.9 PG Final    MCHC 08/26/2024 31.4  31.4 - 35.0 g/dL Final    RDW 08/26/2024 17.8 (H)  11.9 - 14.6 % Final    Platelets 08/26/2024 263  150 - 450 K/uL Final    MPV 08/26/2024 9.5  9.4 - 12.3 FL Final    nRBC 08/26/2024 0.00  0.0 - 0.2 K/uL Final    **Note: Absolute NRBC parameter is now reported with Hemogram**    Neutrophils % 08/26/2024 62  43 - 78 % Final    Lymphocytes % 08/26/2024 26  13 - 44 % Final    Monocytes % 08/26/2024 9  4.0 - 12.0 % Final    Eosinophils % 08/26/2024 2  0.5 - 7.8 % Final    Basophils % 08/26/2024 1  0.0 - 2.0 % Final    Immature Granulocytes % 08/26/2024 0  0.0 - 5.0 % Final    Neutrophils Absolute 08/26/2024 4.1  1.7 - 8.2 K/UL Final    Lymphocytes Absolute 08/26/2024 1.7  0.5 - 4.6 K/UL Final    Monocytes Absolute 08/26/2024 0.6  0.1 - 1.3 K/UL Final    Eosinophils Absolute 08/26/2024 0.1  0.0 - 0.8 K/UL Final    Basophils Absolute 08/26/2024 0.0  0.0 - 0.2 K/UL Final    Immature Granulocytes Absolute 08/26/2024 0.0  0.0 - 0.5 K/UL Final    Differential Type  PE has a negative predictive value   of %.  The positive predictive value is 50% or less.                   Please refer to After Visit Summary or QuesComhart for upcoming appointment information. Please call our office for rescheduling needs at least 24 hours before your scheduled appointment time.If you have any questions regarding your upcoming schedule please reach out to your care team through [employer] or call [phone].     Please notify your assigned Nurse Navigator of any unplanned hospital admissions or Emergency Room visits within 24 hours of discharge.    -------------------------------------------------------------------------------------------------------------------  Please call our office at [phone] if you have any  of the following symptoms:   Fever of 100.5 or greater  Chills  Shortness of breath  Swelling or pain in one leg    After office hours an answering service is available and will contact a provider for emergencies or if you are experiencing any of the above symptoms.        IVÁN VERDUGO RN

## 2024-09-06 ENCOUNTER — INITIAL CONSULT (OUTPATIENT)
Age: 74
End: 2024-09-06
Payer: MEDICARE

## 2024-09-06 VITALS
SYSTOLIC BLOOD PRESSURE: 130 MMHG | WEIGHT: 205.6 LBS | HEIGHT: 68 IN | DIASTOLIC BLOOD PRESSURE: 78 MMHG | BODY MASS INDEX: 31.16 KG/M2 | HEART RATE: 72 BPM

## 2024-09-06 DIAGNOSIS — E78.00 PURE HYPERCHOLESTEROLEMIA: ICD-10-CM

## 2024-09-06 DIAGNOSIS — I71.21 ANEURYSM OF ASCENDING AORTA WITHOUT RUPTURE (HCC): Primary | ICD-10-CM

## 2024-09-06 PROCEDURE — 1036F TOBACCO NON-USER: CPT | Performed by: INTERNAL MEDICINE

## 2024-09-06 PROCEDURE — G8417 CALC BMI ABV UP PARAM F/U: HCPCS | Performed by: INTERNAL MEDICINE

## 2024-09-06 PROCEDURE — G8428 CUR MEDS NOT DOCUMENT: HCPCS | Performed by: INTERNAL MEDICINE

## 2024-09-06 PROCEDURE — 1123F ACP DISCUSS/DSCN MKR DOCD: CPT | Performed by: INTERNAL MEDICINE

## 2024-09-06 PROCEDURE — 93000 ELECTROCARDIOGRAM COMPLETE: CPT | Performed by: INTERNAL MEDICINE

## 2024-09-06 PROCEDURE — 3017F COLORECTAL CA SCREEN DOC REV: CPT | Performed by: INTERNAL MEDICINE

## 2024-09-06 PROCEDURE — 99204 OFFICE O/P NEW MOD 45 MIN: CPT | Performed by: INTERNAL MEDICINE

## 2024-09-11 ASSESSMENT — ENCOUNTER SYMPTOMS
SHORTNESS OF BREATH: 0
ABDOMINAL PAIN: 0

## 2024-09-27 DIAGNOSIS — G50.0 TRIGEMINAL NEURALGIA: ICD-10-CM

## 2024-09-30 DIAGNOSIS — G50.0 TRIGEMINAL NEURALGIA: ICD-10-CM

## 2024-09-30 RX ORDER — PREGABALIN 100 MG/1
100 CAPSULE ORAL 3 TIMES DAILY
Qty: 90 CAPSULE | Refills: 1 | Status: SHIPPED | OUTPATIENT
Start: 2024-10-05 | End: 2024-10-03 | Stop reason: SDUPTHER

## 2024-09-30 RX ORDER — PREGABALIN 100 MG/1
100 CAPSULE ORAL 3 TIMES DAILY
Qty: 90 CAPSULE | Refills: 1 | Status: SHIPPED | OUTPATIENT
Start: 2024-10-05 | End: 2024-09-30 | Stop reason: SDUPTHER

## 2024-09-30 NOTE — TELEPHONE ENCOUNTER
Received alert that lyrica refill to pharmacy failed to transmit. New prescription sent to pharmacy.     Orders Placed This Encounter    pregabalin (LYRICA) 100 MG capsule     Sig: Take 1 capsule by mouth 3 times daily for 60 days. TAKE IN PLACE OF GABAPENTIN. DO NOT STOP MEDICATION WITHOUT FIRST TALKING TO YOUR DOCTOR. DO NOT DRINK ALCOHOL, DRIVE OR OPERATE HEAVY MACHINERY AFTER USE OF THIS MEDICATION AS IT MAY CAUSE DROWSINESS. DO NOT FILL PRIOR TO 10/5/24 Max Daily Amount: 300 mg     Dispense:  90 capsule     Refill:  1

## 2024-09-30 NOTE — TELEPHONE ENCOUNTER
Refill request received for lyrica. Last seen in office on 7/25/24. Follow up visit scheduled for 12/4/24. PDMP reviewed but no medications listed (I have seen this with lyrica in the past for other patients). Dispense report showed pregabalin 100mg caps, #90, 30 day supply last filled on 9/5/24. Predated prescription sent to pharmacy on record.     Orders Placed This Encounter    pregabalin (LYRICA) 100 MG capsule     Sig: Take 1 capsule by mouth 3 times daily for 60 days. TAKE IN PLACE OF GABAPENTIN. DO NOT STOP MEDICATION WITHOUT FIRST TALKING TO YOUR DOCTOR. DO NOT DRINK ALCOHOL, DRIVE OR OPERATE HEAVY MACHINERY AFTER USE OF THIS MEDICATION AS IT MAY CAUSE DROWSINESS. DO NOT FILL PRIOR TO 10/5/24 Max Daily Amount: 300 mg     Dispense:  90 capsule     Refill:  1

## 2024-10-03 RX ORDER — PREGABALIN 100 MG/1
100 CAPSULE ORAL 3 TIMES DAILY
Qty: 90 CAPSULE | Refills: 2 | Status: SHIPPED | OUTPATIENT
Start: 2024-10-03 | End: 2025-01-01

## 2024-10-03 NOTE — TELEPHONE ENCOUNTER
Spoke to [facility] pharmacy who states they did not receive original prescription. Pharmacy states that they do have current prescription strength in stock and will need a new prescription sent over.

## 2024-10-03 NOTE — TELEPHONE ENCOUNTER
PDMP reviewed showing no medications filled however I have had this happen with other patients on Lyrica before.  Medication dispense report shows pregabalin 100 mg capsules, quantity #90, 30-day supply last filled on 9/5/2024.  New prescription sent to pharmacy.    Orders Placed This Encounter    DISCONTD: pregabalin (LYRICA) 100 MG capsule     Sig: Take 1 capsule by mouth 3 times daily for 60 days. TAKE IN PLACE OF GABAPENTIN. DO NOT STOP MEDICATION WITHOUT FIRST TALKING TO YOUR DOCTOR. DO NOT DRINK ALCOHOL, DRIVE OR OPERATE HEAVY MACHINERY AFTER USE OF THIS MEDICATION AS IT MAY CAUSE DROWSINESS. DO NOT FILL PRIOR TO 10/5/24 Max Daily Amount: 300 mg     Dispense:  90 capsule     Refill:  1    pregabalin (LYRICA) 100 MG capsule     Sig: Take 1 capsule by mouth 3 times daily for 90 days. TAKE IN PLACE OF GABAPENTIN. DO NOT STOP MEDICATION WITHOUT FIRST TALKING TO YOUR DOCTOR. DO NOT DRINK ALCOHOL, DRIVE OR OPERATE HEAVY MACHINERY AFTER USE OF THIS MEDICATION AS IT MAY CAUSE DROWSINESS Max Daily Amount: 300 mg     Dispense:  90 capsule     Refill:  2

## 2024-12-13 ENCOUNTER — OFFICE VISIT (OUTPATIENT)
Dept: INTERNAL MEDICINE CLINIC | Facility: CLINIC | Age: 74
End: 2024-12-13

## 2024-12-13 VITALS
SYSTOLIC BLOOD PRESSURE: 138 MMHG | DIASTOLIC BLOOD PRESSURE: 88 MMHG | OXYGEN SATURATION: 100 % | WEIGHT: 212 LBS | BODY MASS INDEX: 32.13 KG/M2 | TEMPERATURE: 97.7 F | HEART RATE: 64 BPM | HEIGHT: 68 IN

## 2024-12-13 DIAGNOSIS — Z00.00 MEDICARE ANNUAL WELLNESS VISIT, SUBSEQUENT: Primary | ICD-10-CM

## 2024-12-13 DIAGNOSIS — Z90.79 HISTORY OF TRANSURETHRAL RESECTION OF PROSTATE: ICD-10-CM

## 2024-12-13 DIAGNOSIS — R97.20 ELEVATED PSA: ICD-10-CM

## 2024-12-13 DIAGNOSIS — R73.03 PREDIABETES: ICD-10-CM

## 2024-12-13 DIAGNOSIS — Z86.718 HISTORY OF RECURRENT DEEP VEIN THROMBOSIS (DVT): ICD-10-CM

## 2024-12-13 DIAGNOSIS — Z79.01 CURRENT USE OF LONG TERM ANTICOAGULATION: ICD-10-CM

## 2024-12-13 DIAGNOSIS — Z86.711 HISTORY OF PULMONARY EMBOLUS (PE): ICD-10-CM

## 2024-12-13 DIAGNOSIS — R32 URINARY INCONTINENCE, UNSPECIFIED TYPE: ICD-10-CM

## 2024-12-13 DIAGNOSIS — G20.A1 PARKINSON'S DISEASE, UNSPECIFIED WHETHER DYSKINESIA PRESENT, UNSPECIFIED WHETHER MANIFESTATIONS FLUCTUATE (HCC): ICD-10-CM

## 2024-12-13 DIAGNOSIS — G50.0 TRIGEMINAL NEURALGIA OF RIGHT SIDE OF FACE: ICD-10-CM

## 2024-12-13 DIAGNOSIS — Z99.89 USE OF CANE AS AMBULATORY AID: ICD-10-CM

## 2024-12-13 DIAGNOSIS — Z98.890 HISTORY OF TRANSURETHRAL RESECTION OF PROSTATE: ICD-10-CM

## 2024-12-13 RX ORDER — RASAGILINE 1 MG/1
1 TABLET ORAL DAILY
COMMUNITY
Start: 2024-09-27 | End: 2025-09-23

## 2024-12-13 SDOH — HEALTH STABILITY: PHYSICAL HEALTH: ON AVERAGE, HOW MANY DAYS PER WEEK DO YOU ENGAGE IN MODERATE TO STRENUOUS EXERCISE (LIKE A BRISK WALK)?: 0 DAYS

## 2024-12-13 SDOH — ECONOMIC STABILITY: FOOD INSECURITY: WITHIN THE PAST 12 MONTHS, YOU WORRIED THAT YOUR FOOD WOULD RUN OUT BEFORE YOU GOT MONEY TO BUY MORE.: NEVER TRUE

## 2024-12-13 SDOH — ECONOMIC STABILITY: INCOME INSECURITY: HOW HARD IS IT FOR YOU TO PAY FOR THE VERY BASICS LIKE FOOD, HOUSING, MEDICAL CARE, AND HEATING?: NOT HARD AT ALL

## 2024-12-13 SDOH — ECONOMIC STABILITY: TRANSPORTATION INSECURITY
IN THE PAST 12 MONTHS, HAS LACK OF TRANSPORTATION KEPT YOU FROM MEETINGS, WORK, OR FROM GETTING THINGS NEEDED FOR DAILY LIVING?: NO

## 2024-12-13 SDOH — ECONOMIC STABILITY: FOOD INSECURITY: WITHIN THE PAST 12 MONTHS, THE FOOD YOU BOUGHT JUST DIDN'T LAST AND YOU DIDN'T HAVE MONEY TO GET MORE.: NEVER TRUE

## 2024-12-13 ASSESSMENT — PATIENT HEALTH QUESTIONNAIRE - PHQ9
SUM OF ALL RESPONSES TO PHQ QUESTIONS 1-9: 0
2. FEELING DOWN, DEPRESSED OR HOPELESS: NOT AT ALL
1. LITTLE INTEREST OR PLEASURE IN DOING THINGS: NOT AT ALL
SUM OF ALL RESPONSES TO PHQ9 QUESTIONS 1 & 2: 0
SUM OF ALL RESPONSES TO PHQ QUESTIONS 1-9: 0

## 2024-12-13 ASSESSMENT — LIFESTYLE VARIABLES
HOW MANY STANDARD DRINKS CONTAINING ALCOHOL DO YOU HAVE ON A TYPICAL DAY: 1
HOW MANY STANDARD DRINKS CONTAINING ALCOHOL DO YOU HAVE ON A TYPICAL DAY: 1 OR 2
HOW OFTEN DO YOU HAVE A DRINK CONTAINING ALCOHOL: 2-4 TIMES A MONTH
HOW OFTEN DO YOU HAVE SIX OR MORE DRINKS ON ONE OCCASION: 1
HOW OFTEN DO YOU HAVE A DRINK CONTAINING ALCOHOL: 3

## 2024-12-13 NOTE — PROGRESS NOTES
Medicare Annual Wellness Visit    Nilay Sprague is here for Medicare AWV    Assessment & Plan   Medicare annual wellness visit, subsequent  Elevated PSA  -     [facility]  History of transurethral resection of prostate  Comments:  2024  Orders:  -     [facility]  Urinary incontinence, unspecified type  -     [facility]  Parkinson's disease, unspecified whether dyskinesia present, unspecified whether manifestations fluctuate (HCC)  Comments:  managed by neurology  Trigeminal neuralgia of right side of face  Comments:  pain controlled with lyrica  BMI 32.0-32.9,adult  Use of cane as ambulatory aid  Current use of long term anticoagulation  History of recurrent deep vein thrombosis (DVT)  Comments:  taking eliquis indefinitely  History of pulmonary embolus (PE)  Comments:  02/24  Recommendations for Preventive Services Due: see orders and patient instructions/AVS.  Recommended screening schedule for the next 5-10 years is provided to the patient in written form: see Patient Instructions/AVS.     Return in 6 months (on 6/13/2025) for med refills, get fasting labs prior to appt.     Subjective   The following acute and/or chronic problems were also addressed today:  Elevated PSA- most recently 6.3 on 7/18/24  Prior intolerance to finasteride with decreased libido  S/P TURP since last seen by urologist in [place] in 2/2024  S/P cystoscopy on 2/27/24 for hematuria (clot removed and fulgurated and vaporized prostate to stop bleeding)  Not currently on any medication. Per patient most recent psa is down from prior (last reportedly in the 7's per patient). Currently sees urologist in [place] but needs referral to local urology group for follow up. He has urinary incontinence and uses pads/depends all the time. Also endorses frequency, urgency, no dysuria, hematuria. No hesitancy, no weak urine stream. No hx of prostatitis.

## 2024-12-19 DIAGNOSIS — Z86.718 HISTORY OF DVT IN ADULTHOOD: Primary | ICD-10-CM

## 2024-12-19 DIAGNOSIS — D68.59 THROMBOPHILIA (HCC): ICD-10-CM

## 2024-12-29 NOTE — PROGRESS NOTES
disturbance:  He would benefit from outpatient physical therapy for his gait disturbance and we will arrange this at [facility] per patient request.     Trigeminal neuralgia:  He will continue pregabalin as prescribed for trigeminal neuralgia, which is currently well-controlled.      Follow up in 4 months.      Signature: Pete Kimble MD      Date:  1/1/2025    [address], [address] [place] [address]  Ph: [phone]  Fax: [phone]         I have personally interviewed and examined Mr. Nilay Sprague and I have personally reviewed all relevant records including labs and imaging as noted above. I have written all aspects of this note. More than 50% of this time was used for counseling regarding my diagnosis, prognosis, and plans for management. Total visit time: 52 minutes.

## 2024-12-30 ENCOUNTER — OFFICE VISIT (OUTPATIENT)
Dept: NEUROLOGY | Age: 74
End: 2024-12-30
Payer: MEDICARE

## 2024-12-30 VITALS
HEART RATE: 69 BPM | SYSTOLIC BLOOD PRESSURE: 136 MMHG | DIASTOLIC BLOOD PRESSURE: 89 MMHG | OXYGEN SATURATION: 98 % | BODY MASS INDEX: 33.15 KG/M2 | WEIGHT: 218 LBS

## 2024-12-30 DIAGNOSIS — G20.C PRIMARY PARKINSONISM (HCC): Primary | ICD-10-CM

## 2024-12-30 DIAGNOSIS — R26.9 GAIT DISTURBANCE: ICD-10-CM

## 2024-12-30 DIAGNOSIS — G50.0 TRIGEMINAL NEURALGIA OF RIGHT SIDE OF FACE: ICD-10-CM

## 2024-12-30 PROCEDURE — 99204 OFFICE O/P NEW MOD 45 MIN: CPT | Performed by: PSYCHIATRY & NEUROLOGY

## 2024-12-30 PROCEDURE — 1123F ACP DISCUSS/DSCN MKR DOCD: CPT | Performed by: PSYCHIATRY & NEUROLOGY

## 2024-12-30 PROCEDURE — G8427 DOCREV CUR MEDS BY ELIG CLIN: HCPCS | Performed by: PSYCHIATRY & NEUROLOGY

## 2024-12-30 PROCEDURE — G8417 CALC BMI ABV UP PARAM F/U: HCPCS | Performed by: PSYCHIATRY & NEUROLOGY

## 2024-12-30 PROCEDURE — G8482 FLU IMMUNIZE ORDER/ADMIN: HCPCS | Performed by: PSYCHIATRY & NEUROLOGY

## 2024-12-30 PROCEDURE — 1036F TOBACCO NON-USER: CPT | Performed by: PSYCHIATRY & NEUROLOGY

## 2024-12-30 PROCEDURE — 1159F MED LIST DOCD IN RCRD: CPT | Performed by: PSYCHIATRY & NEUROLOGY

## 2024-12-30 RX ORDER — SENNOSIDES A AND B 8.6 MG/1
1 TABLET, FILM COATED ORAL DAILY
COMMUNITY

## 2024-12-31 ENCOUNTER — HOSPITAL ENCOUNTER (OUTPATIENT)
Dept: LAB | Age: 74
Discharge: HOME | End: 2024-12-31
Payer: MEDICARE

## 2024-12-31 DIAGNOSIS — D68.59 THROMBOPHILIA (HCC): ICD-10-CM

## 2024-12-31 LAB
ALBUMIN SERPL-MCNC: 3.6 G/DL (ref 3.2–4.6)
ALBUMIN/GLOB SERPL: 1.1 (ref 1–1.9)
ALP SERPL-CCNC: 65 U/L (ref 40–129)
ALT SERPL-CCNC: 11 U/L (ref 8–55)
ANION GAP SERPL CALC-SCNC: 10 MMOL/L (ref 7–16)
AST SERPL-CCNC: 17 U/L (ref 15–37)
BASOPHILS # BLD: 0 K/UL (ref 0–0.2)
BASOPHILS NFR BLD: 1 % (ref 0–2)
BILIRUB SERPL-MCNC: 0.6 MG/DL (ref 0–1.2)
BUN SERPL-MCNC: 19 MG/DL (ref 8–23)
CALCIUM SERPL-MCNC: 8.8 MG/DL (ref 8.8–10.2)
CHLORIDE SERPL-SCNC: 106 MMOL/L (ref 98–107)
CO2 SERPL-SCNC: 25 MMOL/L (ref 20–29)
CREAT SERPL-MCNC: 0.91 MG/DL (ref 0.8–1.3)
D DIMER PPP FEU-MCNC: <0.27 UG/ML(FEU)
DIFFERENTIAL METHOD BLD: NORMAL
EOSINOPHIL # BLD: 0.1 K/UL (ref 0–0.8)
EOSINOPHIL NFR BLD: 2 % (ref 0.5–7.8)
ERYTHROCYTE [DISTWIDTH] IN BLOOD BY AUTOMATED COUNT: 13.3 % (ref 11.9–14.6)
GLOBULIN SER CALC-MCNC: 3.3 G/DL (ref 2.3–3.5)
GLUCOSE SERPL-MCNC: 96 MG/DL (ref 70–99)
HCT VFR BLD AUTO: 45 % (ref 41.1–50.3)
HGB BLD-MCNC: 14.4 G/DL (ref 13.6–17.2)
IMM GRANULOCYTES # BLD AUTO: 0 K/UL (ref 0–0.5)
IMM GRANULOCYTES NFR BLD AUTO: 1 % (ref 0–5)
LYMPHOCYTES # BLD: 1.8 K/UL (ref 0.5–4.6)
LYMPHOCYTES NFR BLD: 29 % (ref 13–44)
MCH RBC QN AUTO: 28 PG (ref 26.1–32.9)
MCHC RBC AUTO-ENTMCNC: 32 G/DL (ref 31.4–35)
MCV RBC AUTO: 87.5 FL (ref 82–102)
MONOCYTES # BLD: 0.4 K/UL (ref 0.1–1.3)
MONOCYTES NFR BLD: 7 % (ref 4–12)
NEUTS SEG # BLD: 3.8 K/UL (ref 1.7–8.2)
NEUTS SEG NFR BLD: 60 % (ref 43–78)
NRBC # BLD: 0 K/UL (ref 0–0.2)
PLATELET # BLD AUTO: 251 K/UL (ref 150–450)
PMV BLD AUTO: 10 FL (ref 9.4–12.3)
POTASSIUM SERPL-SCNC: 4.1 MMOL/L (ref 3.5–5.1)
PROT SERPL-MCNC: 6.9 G/DL (ref 6.3–8.2)
RBC # BLD AUTO: 5.14 M/UL (ref 4.23–5.6)
SODIUM SERPL-SCNC: 141 MMOL/L (ref 136–145)
WBC # BLD AUTO: 6.1 K/UL (ref 4.3–11.1)

## 2024-12-31 PROCEDURE — 85306 CLOT INHIBIT PROT S FREE: CPT

## 2024-12-31 PROCEDURE — 36415 COLL VENOUS BLD VENIPUNCTURE: CPT

## 2024-12-31 PROCEDURE — 86146 BETA-2 GLYCOPROTEIN ANTIBODY: CPT

## 2024-12-31 PROCEDURE — 86147 CARDIOLIPIN ANTIBODY EA IG: CPT

## 2024-12-31 PROCEDURE — 85303 CLOT INHIBIT PROT C ACTIVITY: CPT

## 2024-12-31 PROCEDURE — 85300 ANTITHROMBIN III ACTIVITY: CPT

## 2024-12-31 PROCEDURE — 85732 THROMBOPLASTIN TIME PARTIAL: CPT

## 2024-12-31 PROCEDURE — 85613 RUSSELL VIPER VENOM DILUTED: CPT

## 2024-12-31 PROCEDURE — 85025 COMPLETE CBC W/AUTO DIFF WBC: CPT

## 2024-12-31 PROCEDURE — 80053 COMPREHEN METABOLIC PANEL: CPT

## 2024-12-31 PROCEDURE — 85379 FIBRIN DEGRADATION QUANT: CPT

## 2025-01-01 LAB
B2 GLYCOPROT1 IGA SER-ACNC: <9 GPI IGA UNITS (ref 0–25)
B2 GLYCOPROT1 IGG SER-ACNC: <9 GPI IGG UNITS (ref 0–20)
B2 GLYCOPROT1 IGM SER-ACNC: <9 GPI IGM UNITS (ref 0–32)
CARDIOLIPIN IGA SER IA-ACNC: <9 APL U/ML (ref 0–11)
CARDIOLIPIN IGG SER IA-ACNC: <9 GPL U/ML (ref 0–14)
CARDIOLIPIN IGM SER IA-ACNC: <9 MPL U/ML (ref 0–12)

## 2025-01-01 ASSESSMENT — ENCOUNTER SYMPTOMS
VOICE CHANGE: 0
COUGH: 0
ABDOMINAL PAIN: 0

## 2025-01-02 ENCOUNTER — HOSPITAL ENCOUNTER (OUTPATIENT)
Dept: ULTRASOUND IMAGING | Age: 75
Discharge: HOME | End: 2025-01-02
Attending: INTERNAL MEDICINE
Payer: MEDICARE

## 2025-01-02 DIAGNOSIS — Z86.718 HISTORY OF DVT IN ADULTHOOD: ICD-10-CM

## 2025-01-02 LAB
AT III PPP CHRO-ACNC: 124 % (ref 75–135)
DRVVT RATIO: 0.9 RATIO (ref 0.8–1.2)
LA 2 SCREEN W REFLEX-IMP: ABNORMAL
MIXING DRVVT: 43.7 SEC (ref 0–40.4)
PROT C PPP-ACNC: 93 % (ref 73–180)
PROT S ACT/NOR PPP: 105 % (ref 63–140)
SCREEN APTT: 37.5 SEC (ref 0–43.5)
SCREEN DRVVT: 51 SEC (ref 0–47)

## 2025-01-02 PROCEDURE — 93970 EXTREMITY STUDY: CPT | Performed by: RADIOLOGY

## 2025-01-02 PROCEDURE — 93970 EXTREMITY STUDY: CPT

## 2025-01-06 ENCOUNTER — OFFICE VISIT (OUTPATIENT)
Dept: ONCOLOGY | Age: 75
End: 2025-01-06
Payer: MEDICARE

## 2025-01-06 VITALS
OXYGEN SATURATION: 98 % | RESPIRATION RATE: 16 BRPM | DIASTOLIC BLOOD PRESSURE: 82 MMHG | SYSTOLIC BLOOD PRESSURE: 138 MMHG | BODY MASS INDEX: 33.04 KG/M2 | HEART RATE: 67 BPM | WEIGHT: 218 LBS | TEMPERATURE: 97.7 F | HEIGHT: 68 IN

## 2025-01-06 DIAGNOSIS — Z86.718 HISTORY OF DVT IN ADULTHOOD: Primary | ICD-10-CM

## 2025-01-06 LAB
F2 GENE MUT ANL BLD/T: NORMAL
F5 P.R506Q BLD/T QL: NORMAL
IMP & REVIEW OF LAB RESULTS: NORMAL
IMP & REVIEW OF LAB RESULTS: NORMAL

## 2025-01-06 PROCEDURE — G8428 CUR MEDS NOT DOCUMENT: HCPCS | Performed by: INTERNAL MEDICINE

## 2025-01-06 PROCEDURE — 1036F TOBACCO NON-USER: CPT | Performed by: INTERNAL MEDICINE

## 2025-01-06 PROCEDURE — 1123F ACP DISCUSS/DSCN MKR DOCD: CPT | Performed by: INTERNAL MEDICINE

## 2025-01-06 PROCEDURE — G8417 CALC BMI ABV UP PARAM F/U: HCPCS | Performed by: INTERNAL MEDICINE

## 2025-01-06 PROCEDURE — 1126F AMNT PAIN NOTED NONE PRSNT: CPT | Performed by: INTERNAL MEDICINE

## 2025-01-06 PROCEDURE — 99214 OFFICE O/P EST MOD 30 MIN: CPT | Performed by: INTERNAL MEDICINE

## 2025-01-06 PROCEDURE — M1308 PR FLU IMMUNIZE NO ADMIN: HCPCS | Performed by: INTERNAL MEDICINE

## 2025-01-06 ASSESSMENT — PATIENT HEALTH QUESTIONNAIRE - PHQ9
SUM OF ALL RESPONSES TO PHQ QUESTIONS 1-9: 0
1. LITTLE INTEREST OR PLEASURE IN DOING THINGS: NOT AT ALL
2. FEELING DOWN, DEPRESSED OR HOPELESS: NOT AT ALL
SUM OF ALL RESPONSES TO PHQ9 QUESTIONS 1 & 2: 0
SUM OF ALL RESPONSES TO PHQ QUESTIONS 1-9: 0

## 2025-01-06 NOTE — PATIENT INSTRUCTIONS
Patient Information from Today's Visit    Diagnosis: blood clots      Follow Up Instructions:   - we'll wait for one more lab test to come back and then we can decrease your eliquis to 2.5 mg twice a day. You can finish up what eliquis you already have and then start new prescription  - follow up in 6 months with labs one week prior       Treatment Summary has been discussed and given to patient:No      Current Labs:   Hospital Outpatient Visit on 12/31/2024   Component Date Value Ref Range Status    Protein S, Functional 12/31/2024 105  63 - 140 % Final    Comment: (NOTE)  Protein S activity may be falsely increased (masking an abnormal, low  result) in patients receiving direct Xa inhibitor (e.g.,  rivaroxaban, apixaban, edoxaban) or a direct thrombin inhibitor  (e.g., dabigatran) anticoagulant treatment due to assay interference  by these drugs.  Performed At:  [facility] [id number]  Mikey Verdin MD Ph:[phone]      PTT-LA 12/31/2024 37.5  0.0 - 43.5 sec Final    dRVVT 12/31/2024 51.0 (H)  0.0 - 47.0 sec Final    Interpretation 12/31/2024 Comment:   Final    Comment: (NOTE)  No lupus anticoagulant was detected. These results are consistent with  specific inhibitors to one or more common pathway factors (X, V, II or  fibrinogen).  Performed At:  [facility] [id number]  Mikey Verdin MD Ph:[phone]      Factor V Leiden Mutation 12/31/2024 Comment    Final    Comment: (NOTE)  Result: c.1601G>A ([id number]) - Not Detected  This result is not associated with an increased risk for venous  thromboembolism. See Additional Clinical Information and  Comments.  Additional Clinical Information:  Venous thromboembolism is a multifactorial disease influenced by  genetic, environmental, and circumstantial risk factors. The  c.1601G>A (p. Lam540Jrp) variant in the F5 gene, commonly referred to  as Factor V Leiden, is a genetic risk

## 2025-01-06 NOTE — PROGRESS NOTES
Data Source: Patient, [facility] record.    1/6/2025    12:24 PM    Nilay Sprague [id number]    74 y.o.      Patient Encounter: [facility] Visit    Heme Diagnosis:  DVT  Heme History (Copied from prior):   71 male, ex-smoker, denies anabolic steroid use, history of LIZETTE, elevated PSA, nephrolithiasis, tonsillectomy, prostate biopsy, colonoscopy approximately 3 years ago with benign polyp, multiple skin cancers including recent melanoma resection (follows regularly with dermatologist), more recently underwent right knee replacement 3/1/2021 with Dr. Dwyer.  He subsequently presented to ED with right calf discomfort and swelling 3/7/2021.  RLE Dopplers revealed occlusive DVT in the distal peroneal vein.  Patient was subsequently started on apixaban.  No D-dimer in system.  He discontinued his anticoagulation in 6/21.  However more recently reports travelling to [place] from 7/21-9/21 by road. He was not on any antiplatelet therapy. He presented to ED 10/15/2021 with complaints of right calf discomfort described as cramping.  Repeat RLE Dopplers showed nonocclusive DVT in the right peroneal vein likely chronic clot.  He was placed back on apixaban, and is now referred to us for further work-up and management through his primary care.  Interval History:  1/6/2025: Patient reports doing reasonably.  Has remained on apixaban.  Denies any bleeding, falls or trauma.  Blood work with D-dimer negative, hypercoagulable workup has come back unremarkable except prothrombin gene mutation analysis which is still pending.  Recent bilateral LE Dopplers came back negative.  As such if prothrombin gene mutation comes back negative, he will reduce his apixaban dose to 2.5 mg twice daily.  We will thereafter see him back in 6 months.  REVIEW OF SYSTEMS:  As mentioned above, all other systems were reviewed in full and are negative.    Past Medical History:   Diagnosis Date    Allergies     environmental

## 2025-01-09 ENCOUNTER — TELEPHONE (OUTPATIENT)
Dept: ONCOLOGY | Age: 75
End: 2025-01-09

## 2025-01-09 NOTE — TELEPHONE ENCOUNTER
Labs reviewed by Dr. Garcia. Okay to decrease eliquis to 2.5 mg twice a day once he finishes his current prescription. Patient notified. Has has about 2 months left of current rx. [facility]. Patient aware to let us know if he needs prescription prior to this.

## 2025-01-13 ENCOUNTER — PATIENT MESSAGE (OUTPATIENT)
Dept: INTERNAL MEDICINE CLINIC | Facility: CLINIC | Age: 75
End: 2025-01-13

## 2025-01-14 RX ORDER — LORATADINE 10 MG/1
10 TABLET ORAL DAILY
Qty: 90 TABLET | Refills: 2 | Status: SHIPPED | OUTPATIENT
Start: 2025-01-14

## 2025-01-16 ENCOUNTER — HOSPITAL ENCOUNTER (OUTPATIENT)
Dept: NUCLEAR MEDICINE | Age: 75
Discharge: HOME | End: 2025-01-19
Payer: MEDICARE

## 2025-01-16 DIAGNOSIS — G20.C PRIMARY PARKINSONISM (HCC): ICD-10-CM

## 2025-01-16 DIAGNOSIS — R26.9 GAIT DISTURBANCE: ICD-10-CM

## 2025-01-16 PROCEDURE — 78803 RP LOCLZJ TUM SPECT 1 AREA: CPT

## 2025-01-16 PROCEDURE — 3430000000 HC RX DIAGNOSTIC RADIOPHARMACEUTICAL: Performed by: PSYCHIATRY & NEUROLOGY

## 2025-01-16 PROCEDURE — A9584 IODINE I-123 IOFLUPANE: HCPCS | Performed by: PSYCHIATRY & NEUROLOGY

## 2025-01-16 RX ADMIN — IOFLUPANE I-123 4.5 MILLICURIE: 2 INJECTION, SOLUTION INTRAVENOUS at 10:04

## 2025-01-20 ENCOUNTER — PATIENT MESSAGE (OUTPATIENT)
Dept: NEUROLOGY | Age: 75
End: 2025-01-20

## 2025-01-20 DIAGNOSIS — G20.C PRIMARY PARKINSONISM (HCC): Primary | ICD-10-CM

## 2025-01-20 RX ORDER — CARBIDOPA AND LEVODOPA 25; 100 MG/1; MG/1
1 TABLET, EXTENDED RELEASE ORAL 2 TIMES DAILY
Qty: 60 TABLET | Refills: 5 | Status: SHIPPED | OUTPATIENT
Start: 2025-01-20

## 2025-01-20 NOTE — TELEPHONE ENCOUNTER
Physician provider: Dr. Garcia  Reason for today's call (Please detail here patients chief complaint): Pt calling to discuss message sent by Beverly  Last office visit:n/a  Patient Callback Number: [phone]  Was callback number verified?: Yes  Preferred pharmacy (If refill request): [facility] delivery address is [address] [place] [id number]  Calls to office within the last 48 hours?:No    Patient notified that their information will be routed to the [facility] clinical triage team for review. Patient is advised that they will receive a phone call from the triage department. If symptom related and symptoms worsen before receiving a call back, the patient has been advised to proceed to the nearest ED.         Pt calling to discuss previous message sent by Beverly regarding [facility].    [phone]

## 2025-01-20 NOTE — TELEPHONE ENCOUNTER
DaTscan reviewed.  Starting Sinemet CR for idiopathic PD.  Poor tolerance to regular Sinemet in the past.

## 2025-01-29 ENCOUNTER — OFFICE VISIT (OUTPATIENT)
Dept: UROLOGY | Age: 75
End: 2025-01-29
Payer: MEDICARE

## 2025-01-29 DIAGNOSIS — Z98.890 HISTORY OF TRANSURETHRAL RESECTION OF PROSTATE: ICD-10-CM

## 2025-01-29 DIAGNOSIS — R97.20 ELEVATED PSA: Primary | ICD-10-CM

## 2025-01-29 DIAGNOSIS — R32 URINARY INCONTINENCE, UNSPECIFIED TYPE: ICD-10-CM

## 2025-01-29 DIAGNOSIS — Z90.79 HISTORY OF TRANSURETHRAL RESECTION OF PROSTATE: ICD-10-CM

## 2025-01-29 DIAGNOSIS — Z87.442 HISTORY OF KIDNEY STONES: ICD-10-CM

## 2025-01-29 PROBLEM — L90.9 HYPERTROPHIC AND ATROPHIC CONDITION OF SKIN: Status: ACTIVE | Noted: 2024-01-12

## 2025-01-29 PROBLEM — R05.3 CHRONIC COUGH: Status: ACTIVE | Noted: 2024-03-13

## 2025-01-29 PROBLEM — M19.90 OSTEOARTHROSIS: Status: ACTIVE | Noted: 2024-01-12

## 2025-01-29 PROBLEM — Z48.89 ENCOUNTER FOR POSTOPERATIVE CARE: Status: ACTIVE | Noted: 2024-01-12

## 2025-01-29 PROBLEM — N52.9 MALE ERECTILE DISORDER: Status: ACTIVE | Noted: 2020-11-06

## 2025-01-29 PROBLEM — D62 ACUTE POSTHEMORRHAGIC ANEMIA: Status: ACTIVE | Noted: 2024-02-29

## 2025-01-29 PROBLEM — G50.0 TRIGEMINAL NEURALGIA: Status: ACTIVE | Noted: 2020-10-09

## 2025-01-29 PROBLEM — R09.82 POSTNASAL DRIP: Status: ACTIVE | Noted: 2024-01-12

## 2025-01-29 PROBLEM — L85.3 XEROSIS CUTIS: Status: ACTIVE | Noted: 2024-01-12

## 2025-01-29 PROBLEM — H18.839 RECURRENT EROSION OF CORNEA: Status: ACTIVE | Noted: 2024-01-12

## 2025-01-29 PROBLEM — D64.89 OTHER SPECIFIED ANEMIAS: Status: ACTIVE | Noted: 2024-02-21

## 2025-01-29 PROBLEM — H52.00 HYPEROPIA: Status: ACTIVE | Noted: 2024-01-12

## 2025-01-29 PROBLEM — H18.599 ANTERIOR CORNEAL DYSTROPHY: Status: ACTIVE | Noted: 2024-01-12

## 2025-01-29 PROBLEM — H52.4 PRESBYOPIA: Status: ACTIVE | Noted: 2024-01-12

## 2025-01-29 PROBLEM — M1A.09X0 IDIOPATHIC CHRONIC GOUT, MULTIPLE SITES, WITHOUT TOPHUS (TOPHI): Status: ACTIVE | Noted: 2024-03-13

## 2025-01-29 PROBLEM — N30.01 ACUTE CYSTITIS WITH HEMATURIA: Status: ACTIVE | Noted: 2024-03-18

## 2025-01-29 PROBLEM — R31.0 GROSS HEMATURIA: Status: ACTIVE | Noted: 2024-03-01

## 2025-01-29 PROBLEM — E78.00 PURE HYPERCHOLESTEROLEMIA, UNSPECIFIED: Status: ACTIVE | Noted: 2020-11-06

## 2025-01-29 PROBLEM — R42 VERTIGO: Status: ACTIVE | Noted: 2024-01-12

## 2025-01-29 PROBLEM — H91.90 HEARING LOSS: Status: ACTIVE | Noted: 2024-01-12

## 2025-01-29 PROBLEM — R09.02 HYPOXEMIA: Status: ACTIVE | Noted: 2024-02-21

## 2025-01-29 PROBLEM — N12 TUBULO-INTERSTITIAL NEPHRITIS, NOT SPECIFIED AS ACUTE OR CHRONIC: Status: ACTIVE | Noted: 2024-02-27

## 2025-01-29 PROBLEM — R06.00 DYSPNEA, UNSPECIFIED: Status: ACTIVE | Noted: 2024-01-12

## 2025-01-29 PROBLEM — H31.009 CHORIORETINAL SCAR: Status: ACTIVE | Noted: 2024-01-12

## 2025-01-29 PROBLEM — R91.1 PULMONARY NODULE: Status: ACTIVE | Noted: 2024-02-21

## 2025-01-29 PROBLEM — Z79.01 LONG TERM (CURRENT) USE OF ANTICOAGULANTS: Status: ACTIVE | Noted: 2024-02-27

## 2025-01-29 PROBLEM — Z76.89 PERSONS ENCOUNTERING HEALTH SERVICES IN OTHER SPECIFIED CIRCUMSTANCES: Status: ACTIVE | Noted: 2024-03-13

## 2025-01-29 PROBLEM — I71.22 ANEURYSM OF AORTIC ARCH WITHOUT RUPTURE (HCC): Status: ACTIVE | Noted: 2024-02-21

## 2025-01-29 PROBLEM — I26.92 SADDLE EMBOLUS OF PULMONARY ARTERY (HCC): Status: ACTIVE | Noted: 2024-02-27

## 2025-01-29 PROBLEM — D36.9 BENIGN NEOPLASM: Status: ACTIVE | Noted: 2024-01-12

## 2025-01-29 PROBLEM — D48.5 NEOPLASM OF UNCERTAIN BEHAVIOR OF SKIN: Status: ACTIVE | Noted: 2024-01-12

## 2025-01-29 PROBLEM — I5A MYOCARDIAL INJURY: Status: ACTIVE | Noted: 2024-02-21

## 2025-01-29 PROBLEM — L91.9 HYPERTROPHIC AND ATROPHIC CONDITION OF SKIN: Status: ACTIVE | Noted: 2024-01-12

## 2025-01-29 PROBLEM — J30.9 ALLERGIC RHINITIS: Status: ACTIVE | Noted: 2024-01-12

## 2025-01-29 PROBLEM — H35.30 MACULAR DEGENERATION: Status: ACTIVE | Noted: 2024-01-12

## 2025-01-29 PROBLEM — N13.30 UNSPECIFIED HYDRONEPHROSIS: Status: ACTIVE | Noted: 2024-02-27

## 2025-01-29 PROBLEM — I45.10 INCOMPLETE RIGHT BUNDLE BRANCH BLOCK (RBBB): Status: ACTIVE | Noted: 2024-01-12

## 2025-01-29 PROBLEM — R35.0 FREQUENCY OF MICTURITION: Status: ACTIVE | Noted: 2024-03-13

## 2025-01-29 PROBLEM — M11.269 CHONDROCALCINOSIS OF KNEE: Status: ACTIVE | Noted: 2024-05-10

## 2025-01-29 PROBLEM — M23.305 DERANGEMENT OF MEDIAL MENISCUS: Status: ACTIVE | Noted: 2024-01-12

## 2025-01-29 PROBLEM — R68.82 REDUCED LIBIDO: Status: ACTIVE | Noted: 2024-01-12

## 2025-01-29 PROBLEM — N39.490 OVERFLOW INCONTINENCE OF URINE: Status: ACTIVE | Noted: 2024-03-13

## 2025-01-29 PROBLEM — C61 CARCINOMA OF PROSTATE (HCC): Status: ACTIVE | Noted: 2024-01-12

## 2025-01-29 PROBLEM — J32.9 SINUSITIS: Status: ACTIVE | Noted: 2025-01-29

## 2025-01-29 PROBLEM — M77.10 LATERAL EPICONDYLITIS (TENNIS ELBOW): Status: ACTIVE | Noted: 2024-01-12

## 2025-01-29 PROBLEM — E66.9 OBESITY: Status: ACTIVE | Noted: 2024-01-12

## 2025-01-29 PROBLEM — R33.8 OTHER RETENTION OF URINE: Status: ACTIVE | Noted: 2024-03-18

## 2025-01-29 PROBLEM — Z00.00 EXAMINATION: Status: ACTIVE | Noted: 2025-01-29

## 2025-01-29 PROBLEM — M25.569 ARTHRALGIA OF KNEE: Status: ACTIVE | Noted: 2024-01-12

## 2025-01-29 PROBLEM — H69.90 EUSTACHIAN TUBE DYSFUNCTION: Status: ACTIVE | Noted: 2024-01-12

## 2025-01-29 PROBLEM — E29.1 HYPOGONADISM IN MALE: Status: ACTIVE | Noted: 2024-02-02

## 2025-01-29 PROBLEM — N40.1 BENIGN PROSTATIC HYPERPLASIA WITH LOWER URINARY TRACT SYMPTOMS: Status: ACTIVE | Noted: 2020-11-06

## 2025-01-29 PROBLEM — M79.673 PAIN OF FOOT: Status: ACTIVE | Noted: 2024-01-12

## 2025-01-29 PROBLEM — R31.9 HEMATURIA, UNSPECIFIED: Status: ACTIVE | Noted: 2024-03-01

## 2025-01-29 PROBLEM — R05.8 COUGH PRODUCTIVE OF YELLOW SPUTUM: Status: ACTIVE | Noted: 2024-01-12

## 2025-01-29 PROBLEM — L57.0 ACTINIC KERATOSIS: Status: ACTIVE | Noted: 2024-01-12

## 2025-01-29 LAB
BILIRUBIN, URINE, POC: NEGATIVE
BLOOD URINE, POC: NORMAL
GLUCOSE URINE, POC: NEGATIVE MG/DL
KETONES, URINE, POC: NEGATIVE MG/DL
LEUKOCYTE ESTERASE, URINE, POC: NORMAL
NITRITE, URINE, POC: NEGATIVE
PH, URINE, POC: 5.5 (ref 4.6–8)
PROTEIN,URINE, POC: 30 MG/DL
PVR, POC: 0 CC
SPECIFIC GRAVITY, URINE, POC: 1.02 (ref 1–1.03)
URINALYSIS CLARITY, POC: NORMAL
URINALYSIS COLOR, POC: NORMAL
UROBILINOGEN, POC: NORMAL MG/DL

## 2025-01-29 PROCEDURE — 74018 RADEX ABDOMEN 1 VIEW: CPT | Performed by: UROLOGY

## 2025-01-29 PROCEDURE — 1123F ACP DISCUSS/DSCN MKR DOCD: CPT | Performed by: UROLOGY

## 2025-01-29 PROCEDURE — 81003 URINALYSIS AUTO W/O SCOPE: CPT | Performed by: UROLOGY

## 2025-01-29 PROCEDURE — G8427 DOCREV CUR MEDS BY ELIG CLIN: HCPCS | Performed by: UROLOGY

## 2025-01-29 PROCEDURE — G8417 CALC BMI ABV UP PARAM F/U: HCPCS | Performed by: UROLOGY

## 2025-01-29 PROCEDURE — 1159F MED LIST DOCD IN RCRD: CPT | Performed by: UROLOGY

## 2025-01-29 PROCEDURE — 1036F TOBACCO NON-USER: CPT | Performed by: UROLOGY

## 2025-01-29 PROCEDURE — 99204 OFFICE O/P NEW MOD 45 MIN: CPT | Performed by: UROLOGY

## 2025-01-29 PROCEDURE — 51798 US URINE CAPACITY MEASURE: CPT | Performed by: UROLOGY

## 2025-01-29 RX ORDER — MIRABEGRON 50 MG/1
50 TABLET, FILM COATED, EXTENDED RELEASE ORAL DAILY
Qty: 90 TABLET | Refills: 3 | Status: SHIPPED | OUTPATIENT
Start: 2025-01-29

## 2025-01-29 ASSESSMENT — ENCOUNTER SYMPTOMS
SKIN LESIONS: 0
SHORTNESS OF BREATH: 0
ABDOMINAL PAIN: 0
DIARRHEA: 0
VOMITING: 0
CONSTIPATION: 0
EYE DISCHARGE: 0
BLOOD IN STOOL: 0
WHEEZING: 0
BACK PAIN: 0
INDIGESTION: 0
COUGH: 0
EYE PAIN: 0
HEARTBURN: 0
NAUSEA: 0

## 2025-01-29 NOTE — PROGRESS NOTES
Colon    Diabetes Mother     Elevated Lipids Mother     Kidney Disease Mother     Heart Failure Mother     Diabetes Father     Glaucoma Father     Cancer Sister         Liver    Diabetes Brother     Psychiatric Disorder Brother     Suicide Brother     Down Syndrome Brother     Heart Disease Maternal Grandmother 70        MI     Heart Disease Maternal Grandfather 70        MI     Stroke Paternal Grandmother     Heart Disease Paternal Grandfather 50        MI        Review of Systems  Constitutional:   Negative for fever, chills, appetite change, malaise/fatigue, headaches and weight loss.  Skin:  Negative for skin lesions, rash and itching.  Eyes:  Negative for visual disturbance, eye pain and eye discharge.  ENT:  Negative for difficulty articulating words, pain swallowing, high frequency hearing loss and dry mouth.  Respiratory:  Negative for cough, blood in sputum, shortness of breath and wheezing.  Cardiovascular:  Negative for chest pain, hypertension, irregular heartbeat, leg pain, leg swelling, regular rate and rhythm and varicose veins.  GI:  Negative for nausea, vomiting, abdominal pain, blood in stool, constipation, diarrhea, indigestion and heartburn.  Genitourinary: Positive for flank pain, history of urolithiasis, nocturia, urgency, leakage w/ urge, frequent urination and erectile dysfunction. Negative for urinary burning, hematuria, recurrent UTIs, slower stream, straining, incomplete emptying, testicular pain, sexually transmitted disease, discharge and urethral stricture.  Musculoskeletal:  Negative for back pain, bone pain, arthralgias, tenderness, muscle weakness and neck pain.  Neurological:  Negative for dizziness, focal weakness, numbness, seizures and tremors.  Psychological:  Negative for depression and psychiatric problem.  Endocrine:  Negative for cold intolerance, thirst, excessive urination, fatigue and heat intolerance.  Hem/Lymphatic:  Negative for easy bleeding, easy bruising and

## 2025-02-28 ENCOUNTER — TELEPHONE (OUTPATIENT)
Dept: UROLOGY | Age: 75
End: 2025-02-28

## 2025-02-28 ENCOUNTER — HOSPITAL ENCOUNTER (OUTPATIENT)
Dept: CT IMAGING | Age: 75
Discharge: HOME | End: 2025-02-28
Attending: INTERNAL MEDICINE
Payer: MEDICARE

## 2025-02-28 DIAGNOSIS — R91.1 PULMONARY NODULE: ICD-10-CM

## 2025-02-28 PROBLEM — J32.9 SINUSITIS: Status: RESOLVED | Noted: 2025-01-29 | Resolved: 2025-02-28

## 2025-02-28 PROBLEM — Z00.00 EXAMINATION: Status: RESOLVED | Noted: 2025-01-29 | Resolved: 2025-02-28

## 2025-02-28 PROCEDURE — 71250 CT THORAX DX C-: CPT

## 2025-02-28 NOTE — TELEPHONE ENCOUNTER
mirabegron (MYRBETRIQ) 50 MG needs a PA completed prior to dispensing Rx to Pt per [facility].    PA can be completed on Cover My Meds, but if wanting to complete over the phone, [employer] states you will need to call [phone] and press 0 x 2 for the PA Department.

## 2025-03-04 ENCOUNTER — HOSPITAL ENCOUNTER (OUTPATIENT)
Dept: PHYSICAL THERAPY | Age: 75
Setting detail: RECURRING SERIES
Discharge: HOME | End: 2025-03-07
Payer: MEDICARE

## 2025-03-04 DIAGNOSIS — R26.89 OTHER ABNORMALITIES OF GAIT AND MOBILITY: Primary | ICD-10-CM

## 2025-03-04 DIAGNOSIS — R26.89 BALANCE DISORDER: ICD-10-CM

## 2025-03-04 DIAGNOSIS — R29.3 ABNORMAL POSTURE: ICD-10-CM

## 2025-03-04 DIAGNOSIS — R32 URINARY INCONTINENCE, UNSPECIFIED TYPE: ICD-10-CM

## 2025-03-04 PROCEDURE — 97162 PT EVAL MOD COMPLEX 30 MIN: CPT

## 2025-03-04 PROCEDURE — 97110 THERAPEUTIC EXERCISES: CPT

## 2025-03-04 ASSESSMENT — PAIN SCALES - GENERAL: PAINLEVEL_OUTOF10: 0

## 2025-03-04 NOTE — PROGRESS NOTES
Trevino-Mame exercises           Canalith Repositioning treatment/Epley Maneuver  for BPPV (Benign Paroxysmal Positional Vertigo)           Smart Equitest Training: See scanned report.               Treatment/Session Summary:    Treatment Assessment:   Patient tolerated session well. He reported that back muscles felt tight after working on standing posture. Next session: work on rolling in bed.  Communication/Consultation:  Therapy Evaluation sent to referring provider  Equipment provided today:  HEP  Recommendations/Intent for next treatment session: Next visit will focus on strengthening, mobility and gait training, balance training.    >Total Treatment Billable Duration:  15 minutes + evaluation   Time In: 0900  Time Out: 0953     YAYA RICH PT         Charge Capture  Events  "[employer] Portal  Appt Desk  Attendance Report     Future Appointments   Date Time Provider Department Center   3/7/2025  3:50 PM King Michaels MD PPS [facility] AMB   3/11/2025  1:00 PM Yaya Rich, PT [facility]   3/14/2025 10:30 AM Yaya Rich, PT [facility]   3/18/2025  2:30 PM Kelly Pickering, PT [facility]   3/25/2025  1:00 PM aYya Rich, PT [facility]   3/27/2025  1:45 PM Yaya Rich, PT [facility]   4/2/2025 10:15 AM Deon Mohamud MD [id number] [facility]   4/30/2025  2:30 PM Pete Kimble MD BSNI [facility] AMB   6/10/2025 10:00 AM MAT LAB MAT [facility] E[facility] DEP   6/17/2025 10:00 AM Cecilio Kimble DO MAT [facility] DEP   7/1/2025  2:00 PM PERIPHERAL GCCOIG GCC   7/8/2025 11:00 AM Kathy Ocampo NP-C [facility] AMB

## 2025-03-04 NOTE — THERAPY EVALUATION
assist    Strength:   LE STRENGTH:  4/5 hip flexion, 4/5 hip abduction, 4/5 hip adduction, 4/5 hip extension, 4/5 knee extension, 4/5 knee flexion, 4/5 ankle dorsiflexion, 4/5 ankle plantarflexion       Sensation: decreased B LEs, neuropathy.      Outcome Measure:   Tool Used: Hope Balance Scale  Score:  Initial: 40/56 Most Recent: X/56 (Date: -- )   Interpretation of Score: Each section is scored on a 0-4 scale, 0 representing the patient’s inability to perform the task and 4 representing independence.  The scores of each section are added together for a total score of 56.  The higher the patient’s score, the more independent the patient is.  Any score below 45 indicates increased risk for falls.      Tool Used: Five Times Sit to Stand (FTSST or 5xSST)   Score:  Initial: 39 seconds with UE assist Most Recent: X seconds (Date: -- )   Interpretation of Score: This is a measure of functional lower limb muscle strength and quantifying functional change of transitional movements.  A score of 12 seconds or less indicates a normal level of function for community dwelling older individuals, a score of 15 seconds or more is at risk for fall.       Tool Used: Timed “Up and Go” (TUG)  Score:  Initial: 13 seconds with UE assist for sit<>stand, no AD Most Recent: X seconds (Date: -- )   Interpretation of Score: The test measures, in seconds, the time taken by an individual to stand up from a standard arm chair (seat height 46 cm [18 in], arm height 65 cm [25.6 in]), walk a distance of 3 meters (118 in, approx 10 ft), turn, walk back to the chair and sit down.  If the individual takes longer than 14 seconds to complete TUG, this indicates risk for falls.    ASSESSMENT   Initial Assessment:  Patient presents with abnormal posture, imbalance, decreased gait and mobility due to symptoms of Parkinson's disease. Patient has not had PT previously for this diagnosis. Patient was educated today in postural exercises and the importance

## 2025-03-06 NOTE — PROGRESS NOTES
(SENOKOT) 8.6 MG tablet 1 tablet, Oral, DAILY    [facility]

## 2025-03-07 ENCOUNTER — OFFICE VISIT (OUTPATIENT)
Dept: PULMONOLOGY | Age: 75
End: 2025-03-07
Payer: MEDICARE

## 2025-03-07 VITALS
SYSTOLIC BLOOD PRESSURE: 132 MMHG | HEIGHT: 68 IN | BODY MASS INDEX: 32.74 KG/M2 | OXYGEN SATURATION: 96 % | HEART RATE: 74 BPM | TEMPERATURE: 97.9 F | RESPIRATION RATE: 20 BRPM | WEIGHT: 216 LBS | DIASTOLIC BLOOD PRESSURE: 78 MMHG

## 2025-03-07 DIAGNOSIS — R91.1 PULMONARY NODULE: Primary | ICD-10-CM

## 2025-03-07 PROCEDURE — 99214 OFFICE O/P EST MOD 30 MIN: CPT | Performed by: INTERNAL MEDICINE

## 2025-03-07 PROCEDURE — 1159F MED LIST DOCD IN RCRD: CPT | Performed by: INTERNAL MEDICINE

## 2025-03-07 PROCEDURE — 1123F ACP DISCUSS/DSCN MKR DOCD: CPT | Performed by: INTERNAL MEDICINE

## 2025-03-07 PROCEDURE — 1036F TOBACCO NON-USER: CPT | Performed by: INTERNAL MEDICINE

## 2025-03-07 PROCEDURE — G8417 CALC BMI ABV UP PARAM F/U: HCPCS | Performed by: INTERNAL MEDICINE

## 2025-03-07 PROCEDURE — G8427 DOCREV CUR MEDS BY ELIG CLIN: HCPCS | Performed by: INTERNAL MEDICINE

## 2025-03-11 ENCOUNTER — HOSPITAL ENCOUNTER (OUTPATIENT)
Dept: PHYSICAL THERAPY | Age: 75
Setting detail: RECURRING SERIES
Discharge: HOME | End: 2025-03-14
Payer: MEDICARE

## 2025-03-11 DIAGNOSIS — G50.0 TRIGEMINAL NEURALGIA: ICD-10-CM

## 2025-03-11 PROCEDURE — 97110 THERAPEUTIC EXERCISES: CPT

## 2025-03-11 RX ORDER — PREGABALIN 100 MG/1
100 CAPSULE ORAL 3 TIMES DAILY
Qty: 90 CAPSULE | Refills: 2 | Status: SHIPPED | OUTPATIENT
Start: 2025-03-11 | End: 2025-06-09

## 2025-03-11 ASSESSMENT — PAIN SCALES - GENERAL: PAINLEVEL_OUTOF10: 0

## 2025-03-11 NOTE — TELEPHONE ENCOUNTER
Patient came into the office for a Medication Refill Request    Name of Medication : pregabalin (LYRICA)     Strength of Medication: 100 MG capsule     Directions: Take 1 capsule by mouth 3 times daily for 90 days.     30 day or 90 day supply: 90 days    Preferred Pharmacy: [id number] [address] - P [phone]     Last Appt. Date: 12/13/2024    Next Appt. Date: 6/17/2025    Additional Information For Provider: Pt needs a PA to refill medication.    Pt stated he only has one days worth on the medication.

## 2025-03-11 NOTE — TELEPHONE ENCOUNTER
Verified through covermymeds that a PA is not needed for rx. Pt is needing a refill of pregabalin.

## 2025-03-11 NOTE — TELEPHONE ENCOUNTER
Personally called and spoke with patient who confirmed identity via date of birth.  Patient has noticed resolution of his trigeminal neuralgia pain with use of Lyrica.  Does feel little tired and may not be as sharp mentally as he once was but is not certain how much of this is from the medications for his trigeminal neuralgia and/or his Parkinson's.  At this time benefit outweighs risk of medication.  PDMP reviewed but no dispensation's for Lyrica seen.  Unfortunately this is a common theme I've seen in patients who are on Lyrica. Personally spoke with patient's pharmacy who confirmed medication last filled on 1/21/25, quantity #90 with no active prescriptions on file.  New prescription sent to pharmacy.    Orders Placed This Encounter    pregabalin (LYRICA) 100 MG capsule     Sig: Take 1 capsule by mouth 3 times daily for 90 days. TAKE IN PLACE OF GABAPENTIN. DO NOT STOP MEDICATION WITHOUT FIRST TALKING TO YOUR DOCTOR. DO NOT DRINK ALCOHOL, DRIVE OR OPERATE HEAVY MACHINERY AFTER USE OF THIS MEDICATION AS IT MAY CAUSE DROWSINESS Max Daily Amount: 300 mg     Dispense:  90 capsule     Refill:  2

## 2025-03-11 NOTE — PROGRESS NOTES
mechanics.  Progressed resistance, range, repetitions, and complexity of movement as indicated.   Date:  3/5/25 Date:  3/11/25 Date:     Activity/Exercise Parameters Parameters Parameters   Standing against wall  Working on upright posture; head about 4.5 inches from wall; with rows blue band x 10 reps B     Nustep  To address muscular imbalance with progressive resistance  Advancement of increased activity tolerance   Level 2 x 6 minutes    Sit<>stand From chair with UE assist x 10 reps From elevated mat table x 10 reps    Seated rows Blue band x 10 reps with upright posture Blue band 2 x 10 reps with upright posture    Chin tucks In supine for HEP 5 sec holds x 10 reps          Bed mobility  Practiced rolling R/L and supine<>sit to improve independence; did well on mat but told pt non slip socks may make things easier in bed.     PWR! Supine  Supine:  PWR! Moves, all 4 movements, 10 reps posture exercise then x 5 reps each side each last 3 exercises    Supine hooklying core press  With red ball 5 sec holds x 10 reps; then showed pt how to do this exercise with hands on knees in sitting to do at home.    Pt education Movement is important; walking in house with upright posture; drinking water; discussed other possible referrals         NEUROMUSCULAR RE-EDUCATION: (0 minutes):    Exercise/activities per grid below to improve balance, coordination, kinesthetic sense, posture, and proprioception.  Required minimal verbal cues to promote static and dynamic balance in standing.  Activity   Date   Date Date Date Date Date   Activity/Exercise   Sets/reps/equipment Sets/reps/  equipment Sets/reps/  equipment Sets/reps/  equipment Sets/reps/  equipment Sets/reps/  equipment   Walking with head turns             Walking with head up & down             Step ups             Step taps             Marching           Sidestepping           Crossovers           [place]           Walking  backwards             Tandem walking

## 2025-03-14 ENCOUNTER — HOSPITAL ENCOUNTER (OUTPATIENT)
Dept: PHYSICAL THERAPY | Age: 75
Setting detail: RECURRING SERIES
Discharge: HOME | End: 2025-03-17
Payer: MEDICARE

## 2025-03-14 PROCEDURE — 97110 THERAPEUTIC EXERCISES: CPT

## 2025-03-14 PROCEDURE — 97112 NEUROMUSCULAR REEDUCATION: CPT

## 2025-03-14 ASSESSMENT — PAIN SCALES - GENERAL: PAINLEVEL_OUTOF10: 7

## 2025-03-14 NOTE — PROGRESS NOTES
Nilay Sprague  : 1950  Primary: Medicare Part A And B (Medicare)  Secondary:  FOR LIFE MEDICARE SUPP [facility] @ [address]  [facility] [address]  Phone: [phone]  Fax: [phone] Plan Frequency: 1-2 times per week for 8-12 weeks    Plan of Care/Certification Expiration Date: 25        Plan of Care/Certification Expiration Date:  Plan of Care/Certification Expiration Date: 25    Frequency/Duration: Plan Frequency: 1-2 times per week for 8-12 weeks      Time In/Out:   Time In: 1032  Time Out: 1115      PT Visit Info:    Progress Note Due Date: 25  Total # of Visits to Date: 3  Progress Note Counter: 3      Visit Count:  3    OUTPATIENT PHYSICAL THERAPY:   Treatment Note 3/14/2025       Episode  (PT: Parkinsons)               Treatment Diagnosis:    Other abnormalities of gait and mobility  Balance disorder  Abnormal posture  Medical/Referring Diagnosis:    Primary parkinsonism (HCC)  Gait disturbance      Referring Physician:  Pete Kimble MD MD Orders:  PT Eval and Treat   Return MD Appt:  25   Date of Onset:  Onset Date:  (May 2024)     Allergies:   Patient has no known allergies.  Restrictions/Precautions:   Fall Precautions:        Interventions Planned (Treatment may consist of any combination of the following):     See Assessment Note    Subjective Comments: Did my HEP, harder on the bed than on the mat.       Tend to lean forward.  No falls.  Problems:  Rolling over in bed.  Putting on a seatbelt.  Standing up straight.  Standing up from sitting down.   Walking   Initial Pain Level:     7/10  Post Session Pain Level:      7/10  Medications Last Reviewed: 3/14/2025  Updated Objective Findings:  None Today  Treatment   THERAPEUTIC EXERCISE: (33 minutes):    Exercises per grid below to improve mobility and strength.  Required minimal verbal cues to promote proper body alignment, promote proper body posture, and

## 2025-03-17 RX ORDER — MIRABEGRON 50 MG/1
50 TABLET, FILM COATED, EXTENDED RELEASE ORAL DAILY
Qty: 30 TABLET | Refills: 5 | Status: SHIPPED | OUTPATIENT
Start: 2025-03-17

## 2025-03-18 ENCOUNTER — HOSPITAL ENCOUNTER (OUTPATIENT)
Dept: PHYSICAL THERAPY | Age: 75
Setting detail: RECURRING SERIES
Discharge: HOME | End: 2025-03-21
Payer: MEDICARE

## 2025-03-18 PROCEDURE — 97110 THERAPEUTIC EXERCISES: CPT

## 2025-03-18 PROCEDURE — 97112 NEUROMUSCULAR REEDUCATION: CPT

## 2025-03-18 ASSESSMENT — PAIN SCALES - GENERAL: PAINLEVEL_OUTOF10: 5

## 2025-03-18 NOTE — PROGRESS NOTES
today:  HEP  Recommendations/Intent for next treatment session: Next visit will focus on strengthening, mobility and gait training, balance training.    >Total Treatment Billable Duration:  45 minutes   Time In: 1430  Time Out: 1515     DORCAS CERVANTES, PT         Charge Capture  Events  [employer] Portal  Appt Desk  Attendance Report     Future Appointments   Date Time Provider Department Center   3/25/2025  1:00 PM Rahel Rich, PT [facility]   3/27/2025  1:45 PM Rahel Rich, PT [facility]   4/2/2025 10:15 AM Deon Mohamud MD [id number] [facility] AMB   4/30/2025  2:30 PM Pete Kimble MD BS[facility] AMB   6/10/2025 10:00 AM MAT LAB MAT [facility] DEP   6/17/2025 10:00 AM Cecilio Kimble DO MAT [facility] DEP   7/1/2025  2:00 PM PERIPHERAL [facility] GCC   7/8/2025 11:00 AM Kathy Ocampo, NP-C [facility] AM[facility]

## 2025-03-25 ENCOUNTER — HOSPITAL ENCOUNTER (OUTPATIENT)
Dept: PHYSICAL THERAPY | Age: 75
Setting detail: RECURRING SERIES
Discharge: HOME | End: 2025-03-28
Payer: MEDICARE

## 2025-03-25 PROCEDURE — 97110 THERAPEUTIC EXERCISES: CPT

## 2025-03-25 PROCEDURE — 97112 NEUROMUSCULAR REEDUCATION: CPT

## 2025-03-25 ASSESSMENT — PAIN SCALES - GENERAL: PAINLEVEL_OUTOF10: 6

## 2025-03-25 NOTE — PROGRESS NOTES
Standing with feet tandem           Single leg stance           X1/X2 Viewing exercises             Hallpike-Gordon testing for BPPV (Benign Paroxysmal Positional Vertigo)             Trevino-Daroff exercises           Canalith Repositioning treatment/Epley Maneuver  for BPPV (Benign Paroxysmal Positional Vertigo)           Smart Equitest Training: See scanned report.               Treatment/Session Summary:    Treatment Assessment:   Patient had no complaints with exercises. He needs cuing to maintain upright posture. When standing against the door to try to attain upright posture, he reports feeling like he is going to fall forward with weight on balls of feet.   Communication/Consultation:  None today  Equipment provided today:  None  Recommendations/Intent for next treatment session: Next visit will focus on strengthening, mobility and gait training, balance training.    >Total Treatment Billable Duration:  43 minutes   Time In: 1301  Time Out: 1344     YAYA RICH PT         Charge Capture  Events  [employer] Portal  Appt Desk  Attendance Report     Future Appointments   Date Time Provider Department Center   3/27/2025  1:45 PM Yaya Rich PT [facility]   4/2/2025 10:15 AM Deon Mohamud MD [id number] [facility] AMB   4/30/2025  2:30 PM Pete Kimble MD BS[facility] AMB   6/10/2025 10:00 AM MAT LAB MAT [facility] DEP   6/17/2025 10:00 AM Cecilio Kimble DO MAT [facility] DEP   7/1/2025  2:00 PM PERIPHERAL GCCOIG GCC   7/8/2025 11:00 AM Kathy Ocampo, NP-C [facility] AMB

## 2025-03-27 ENCOUNTER — HOSPITAL ENCOUNTER (OUTPATIENT)
Dept: PHYSICAL THERAPY | Age: 75
Setting detail: RECURRING SERIES
Discharge: HOME | End: 2025-03-30
Payer: MEDICARE

## 2025-03-27 PROCEDURE — 97112 NEUROMUSCULAR REEDUCATION: CPT

## 2025-03-27 PROCEDURE — 97110 THERAPEUTIC EXERCISES: CPT

## 2025-03-27 ASSESSMENT — PAIN SCALES - GENERAL: PAINLEVEL_OUTOF10: 0

## 2025-03-27 NOTE — PROGRESS NOTES
Nilay Sprague  : 1950  Primary: Medicare Part A And B (Medicare)  Secondary:  FOR LIFE MEDICARE SUPP [facility] @ [address]  [facility] [address]  Phone: [phone]  Fax: [phone] Plan Frequency: 1-2 times per week for 8-12 weeks    Plan of Care/Certification Expiration Date: 25        Plan of Care/Certification Expiration Date:  Plan of Care/Certification Expiration Date: 25    Frequency/Duration: Plan Frequency: 1-2 times per week for 8-12 weeks      Time In/Out:   Time In: 1353 (late due to parking)  Time Out: 1438      PT Visit Info:     Progress Note Due Date: 25  Total # of Visits to Date: 6  Progress Note Counter: 6      Visit Count:  6    OUTPATIENT PHYSICAL THERAPY:   Treatment Note 3/27/2025       Episode  (PT: Parkinsons)               Treatment Diagnosis:    Other abnormalities of gait and mobility  Balance disorder  Abnormal posture  Medical/Referring Diagnosis:    Primary parkinsonism (HCC)  Gait disturbance      Referring Physician:  Pete Kimble MD MD Orders:  PT Eval and Treat   Return MD Appt:  25   Date of Onset:  Onset Date:  (May 2024)     Allergies:   Patient has no known allergies.  Restrictions/Precautions:   Fall Precautions:        Interventions Planned (Treatment may consist of any combination of the following):     See Assessment Note    Subjective Comments:  late b/c had to walk a long way from parking spot      Tend to lean forward.  No falls.  Problems:  Rolling over in bed.  Putting on a seatbelt.  Standing up straight.  Standing up from sitting down.   Walking   Initial Pain Level:     0 (6/10 if twist the wrong way)/10   Post Session Pain Level:      0/10   Medications Last Reviewed: 3/27/2025  Updated Objective Findings:  None Today  Treatment   THERAPEUTIC EXERCISE: (35 minutes):    Exercises per grid below to improve mobility and strength.  Required minimal verbal cues to promote

## 2025-04-01 ENCOUNTER — HOSPITAL ENCOUNTER (OUTPATIENT)
Dept: PHYSICAL THERAPY | Age: 75
Setting detail: RECURRING SERIES
Discharge: HOME | End: 2025-04-04
Payer: MEDICARE

## 2025-04-01 PROCEDURE — 97110 THERAPEUTIC EXERCISES: CPT

## 2025-04-01 ASSESSMENT — PAIN SCALES - GENERAL: PAINLEVEL_OUTOF10: 0

## 2025-04-01 NOTE — PROGRESS NOTES
Nilay Sprague  : 1950  Primary: Medicare Part A And B (Medicare)  Secondary:  FOR LIFE MEDICARE SUPP [facility] @ [address]  [facility] [address]  Phone: [phone]  Fax: [phone] Plan Frequency: 1-2 times per week for 8-12 weeks    Plan of Care/Certification Expiration Date: 25        Plan of Care/Certification Expiration Date:  Plan of Care/Certification Expiration Date: 25    Frequency/Duration: Plan Frequency: 1-2 times per week for 8-12 weeks      Time In/Out:   Time In: 1030  Time Out: 1115      PT Visit Info:     Progress Note Due Date: 25  Total # of Visits to Date: 7  Progress Note Counter: 7      Visit Count:  7    OUTPATIENT PHYSICAL THERAPY:   Treatment Note 2025       Episode  (PT: Parkinsons)               Treatment Diagnosis:    Other abnormalities of gait and mobility  Balance disorder  Abnormal posture  Medical/Referring Diagnosis:    Primary parkinsonism (HCC)  Gait disturbance      Referring Physician:  Pete Kimble MD MD Orders:  PT Eval and Treat   Return MD Appt:  25   Date of Onset:  Onset Date:  (May 2024)     Allergies:   Patient has no known allergies.  Restrictions/Precautions:   Fall Precautions:        Interventions Planned (Treatment may consist of any combination of the following):     See Assessment Note    Subjective Comments:  Practiced some HEP at home. Feel more tired.      Tend to lean forward.  No falls.  Problems:  Rolling over in bed.  Putting on a seatbelt.  Standing up straight.  Standing up from sitting down.   Walking   Initial Pain Level:     0/10   Post Session Pain Level:      0/10   Medications Last Reviewed: 2025  Updated Objective Findings:  None Today  Treatment   THERAPEUTIC EXERCISE: (45 minutes):    Exercises per grid below to improve mobility and strength.  Required minimal verbal cues to promote proper body alignment, promote proper body posture, and promote

## 2025-04-02 ENCOUNTER — OFFICE VISIT (OUTPATIENT)
Dept: UROLOGY | Age: 75
End: 2025-04-02
Payer: MEDICARE

## 2025-04-02 DIAGNOSIS — Z87.442 HISTORY OF KIDNEY STONES: ICD-10-CM

## 2025-04-02 DIAGNOSIS — Z90.79 HISTORY OF TRANSURETHRAL RESECTION OF PROSTATE: ICD-10-CM

## 2025-04-02 DIAGNOSIS — R97.20 ELEVATED PSA: Primary | ICD-10-CM

## 2025-04-02 DIAGNOSIS — Z98.890 HISTORY OF TRANSURETHRAL RESECTION OF PROSTATE: ICD-10-CM

## 2025-04-02 DIAGNOSIS — R32 URINARY INCONTINENCE, UNSPECIFIED TYPE: ICD-10-CM

## 2025-04-02 PROCEDURE — G8427 DOCREV CUR MEDS BY ELIG CLIN: HCPCS | Performed by: UROLOGY

## 2025-04-02 PROCEDURE — 1123F ACP DISCUSS/DSCN MKR DOCD: CPT | Performed by: UROLOGY

## 2025-04-02 PROCEDURE — 99213 OFFICE O/P EST LOW 20 MIN: CPT | Performed by: UROLOGY

## 2025-04-02 PROCEDURE — 1159F MED LIST DOCD IN RCRD: CPT | Performed by: UROLOGY

## 2025-04-02 PROCEDURE — G8417 CALC BMI ABV UP PARAM F/U: HCPCS | Performed by: UROLOGY

## 2025-04-02 PROCEDURE — 1036F TOBACCO NON-USER: CPT | Performed by: UROLOGY

## 2025-04-02 ASSESSMENT — ENCOUNTER SYMPTOMS
BACK PAIN: 0
NAUSEA: 0

## 2025-04-02 NOTE — PROGRESS NOTES
I will  [facility]  [address] [place] [id number]  [phone]          Nilay Sprague  : 1950    Chief Complaint   Patient presents with    2 Month Follow Up          HPI     Nilay Sprague is a 74 y.o. male    The patient never got Myrbetriq so his situation with his bladder is unchanged.  Right      Past Medical History:   Diagnosis Date    Allergies     environmental    Arthritis     osteo    Bilateral primary osteoarthritis of knee 3/3/2021    Calculus of kidney     Cancer (HCC)     skin    Cataracts, bilateral     Chronic back pain     Erectile dysfunction     Headache     History of elevated PSA     BPH. stable w/meds    History of kidney stones     Parkinson's disease     Pulmonary embolism 2024    Sleep apnea     sleep study performed, pt could not tolerate Cpap    Trigeminal neuralgia     right side of face/head    Urinary incontinence      Past Surgical History:   Procedure Laterality Date    CATARACT REMOVAL Bilateral     COLONOSCOPY      CYSTOSCOPY  2024    EYE SURGERY      Cataracts    HEENT      JOINT REPLACEMENT  3.    Knee    ORTHOPEDIC SURGERY Right ,     meniscus repair     PROSTATE SURGERY  2015    prostate biopsy     THROMBECTOMY  2024    TONSILLECTOMY AND ADENOIDECTOMY      TURP  2024     Current Outpatient Medications   Medication Sig Dispense Refill    MYRBETRIQ 50 MG TB24 Take 50 mg by mouth daily 30 tablet 5    pregabalin (LYRICA) 100 MG capsule Take 1 capsule by mouth 3 times daily for 90 days. TAKE IN PLACE OF GABAPENTIN. DO NOT STOP MEDICATION WITHOUT FIRST TALKING TO YOUR DOCTOR. DO NOT DRINK ALCOHOL, DRIVE OR OPERATE HEAVY MACHINERY AFTER USE OF THIS MEDICATION AS IT MAY CAUSE DROWSINESS Max Daily Amount: 300 mg 90 capsule 2    mirabegron (MYRBETRIQ) 50 MG TB24 Take 50 mg by mouth daily (Patient not taking: Reported on 2025) 90 tablet 3    [facility]      apixaban

## 2025-04-04 ENCOUNTER — HOSPITAL ENCOUNTER (OUTPATIENT)
Dept: PHYSICAL THERAPY | Age: 75
Setting detail: RECURRING SERIES
Discharge: HOME | End: 2025-04-07
Payer: MEDICARE

## 2025-04-04 PROCEDURE — 97110 THERAPEUTIC EXERCISES: CPT

## 2025-04-04 PROCEDURE — 97112 NEUROMUSCULAR REEDUCATION: CPT

## 2025-04-04 ASSESSMENT — PAIN SCALES - GENERAL: PAINLEVEL_OUTOF10: 0

## 2025-04-04 NOTE — PROGRESS NOTES
Nilay Sprague  : 1950  Primary: Medicare Part A And B (Medicare)  Secondary:  FOR LIFE MEDICARE SUPP [facility] @ [address]  [facility] [address]  Phone: [phone]  Fax: [phone] Plan Frequency: 1-2 times per week for 8-12 weeks    Plan of Care/Certification Expiration Date: 25        Plan of Care/Certification Expiration Date:  Plan of Care/Certification Expiration Date: 25    Frequency/Duration: Plan Frequency: 1-2 times per week for 8-12 weeks      Time In/Out:   Time In: 1259  Time Out: 1344      PT Visit Info:    Progress Note Due Date: 25  Total # of Visits to Date: 8  Progress Note Counter: 1      Visit Count:  8                OUTPATIENT PHYSICAL THERAPY:             Progress Report 2025               Episode (PT: Parkinsons)         Treatment Diagnosis:     Other abnormalities of gait and mobility  Balance disorder  Abnormal posture  Medical/Referring Diagnosis:    Primary parkinsonism (HCC)  Gait disturbance       Referring Physician:  Pete Kimble MD MD Orders:  PT Eval and Treat   Return MD Appt:  25  Date of Onset:  Onset Date:  (May 2024)     Allergies:  Patient has no known allergies.  Restrictions/Precautions:    Fall Precautions:        Medications Last Reviewed: 2025     SUBJECTIVE   History of Injury/Illness (Reason for Referral):  May 2024 started on meds. Officially diagnosed with testing recently    Tend to lean forward.  No falls.    Rolling over in bed.  Standing up straight.  Standing up from sitting down.   Walking is a problem      Patient Stated Goal(s):  \"To improve posture, mobility, balance, gait\"  Initial Pain Level:      0/10   Post Session Pain Level:     0/10  Past Medical History/Comorbidities:   Mr. Sprague  has a past medical history of Allergies, Arthritis, Bilateral primary osteoarthritis of knee, Calculus of kidney, Cancer (HCC), Cataracts, bilateral, Chronic back 
Sets/reps/  equipment Sets/reps/  equipment   Walking with head turns            Walking with head up & down            Step ups     With no rails x 5 reps each leg with CGA With no rails x 5 reps each leg with CGA With no rails x 5 reps each leg with CGA     Step overs     Over 1/2 foam roll x 10 reps each leg fwd and laterally with no UE assist   Step taps     6 inch step x 10 reps each leg fwd and cross with CGA with no UE assist.  6 inch step x 10 reps each leg fwd and cross with CGA with no UE assist. Lob x 1 6 inch step x 10 reps each leg fwd and cross with CGA with no UE assist.   6 inch step x 10 reps each leg fwd and cross with SBA with no UE assist.    Marching      Marching fwd/walking back x 4 laps with CGA    Sidestepping          Crossovers          [place]          Walking  backwards            Tandem walking          Weaving in/out of cones            Brain chart #1        X 3 sets to increase cognitive-functional motor planning and coordination   Standing reaching for random targets on wall          X 2 minutes to increase cognitive-functional motor planning and coordination   Ball toss          Kick ball          Figure 8s           Circles right/left          Walking with 360 degree turns          Spirals          Weight shifting:    Left & Right            Weight shifting:  Forward & Backward        On rockerboard.  On rockerboard.     Static Standing Balance            Standing with feet apart            Standing with feet together            Standing with feet semitandem     On blue foams eyes open and closed both positions. On blue foams eyes open and closed both positions    Standing with feet tandem          Single leg stance          X1/X2 Viewing exercises            Hallpike-Raven testing for BPPV (Benign Paroxysmal Positional Vertigo)            Uriel-Mame exercises          Canalith Repositioning treatment/Epley Maneuver  for BPPV (Benign Paroxysmal Positional Vertigo)          Smart

## 2025-04-08 ENCOUNTER — HOSPITAL ENCOUNTER (OUTPATIENT)
Dept: PHYSICAL THERAPY | Age: 75
Setting detail: RECURRING SERIES
Discharge: HOME | End: 2025-04-11
Payer: MEDICARE

## 2025-04-08 PROCEDURE — 97110 THERAPEUTIC EXERCISES: CPT

## 2025-04-08 PROCEDURE — 97112 NEUROMUSCULAR REEDUCATION: CPT

## 2025-04-08 ASSESSMENT — PAIN SCALES - GENERAL: PAINLEVEL_OUTOF10: 0

## 2025-04-08 NOTE — PROGRESS NOTES
and promote proper body mechanics.  Progressed resistance, range, repetitions, and complexity of movement as indicated.   Date:  3/18/25 Date:  3/11/25 Date:  3/14/25 Date   3/25/25 Date   3/27/25 Date   4/1/25 Date   4/4/25 Date   4/8/25   Activity/Exercise Parameters Parameters Parameters        Standing against wall  Working on upright posture: 1) with B shoulder ER orange band 2 x 10 reps B  Working on upright posture: 1) with B shoulder ER orange band 2 x 10 reps B; 2) x 5 reps each UE D2 pattern with orange band, reaching up to hold the torch. Working on upright posture: 1) with B shoulder ER orange band 2 x 10 reps B Working on upright posture: 1) with B shoulder ER orange band 2 x 10 reps B Working on upright posture: 1) with B shoulder ER orange band 2 x 10 reps B  2)Single UE flexion with orange band x 10 reps each UE Standing in bars working on active upright posture, with rows blue band 2 x 10 reps B Working on upright posture: 1) with B shoulder ER orange band 2 x 10 reps B  2)Single UE flexion with orange band x 10 reps each UE    Standing in bars working on active upright posture, with rows blue band 2 x 10 reps B   Seated edge of mat \"putting on seatbelt\"   PT behind with orange band, pt reaches over shoulder and pulls seatbelt across to hip, x 6 reps in each direction (each UE) PT behind with orange band, pt reaches over shoulder and pulls seatbelt across to hip, x 6 reps in each direction (each UE)       Nustep  To address muscular imbalance with progressive resistance  Advancement of increased activity tolerance  Level 2 x 8 minutes Level 2 x 6 minutes  Level 3 x 8 minutes Level 4 x 8 minutes Level 4 x 8 minutes Level 4 x 8 minutes Level 4 x 8 minutes   Sit<>stand From chair with UE assist x 10 reps From elevated mat table x 10 reps From black mat table at lowest level 2 x 10 reps From black mat table at lowest level 2 x 10 reps From black mat table at lowest level 2 x 10 reps opening arms wide

## 2025-04-11 ENCOUNTER — HOSPITAL ENCOUNTER (OUTPATIENT)
Dept: PHYSICAL THERAPY | Age: 75
Setting detail: RECURRING SERIES
Discharge: HOME | End: 2025-04-14
Payer: MEDICARE

## 2025-04-11 PROCEDURE — 97112 NEUROMUSCULAR REEDUCATION: CPT

## 2025-04-11 PROCEDURE — 97110 THERAPEUTIC EXERCISES: CPT

## 2025-04-11 ASSESSMENT — PAIN SCALES - GENERAL: PAINLEVEL_OUTOF10: 0

## 2025-04-11 NOTE — PROGRESS NOTES
Nilay Sprague  : 1950  Primary: Medicare Part A And B (Medicare)  Secondary:  [place] MEDICARE [place] [facility] [place] [facility] [place] @ [address]  [facility] [address]  Phone: [phone]  Fax: [phone] Plan Frequency: 1-2 times per week for 8-12 weeks    Plan of Care/Certification Expiration Date: 25        Plan of Care/Certification Expiration Date:  Plan of Care/Certification Expiration Date: 25    Frequency/Duration: Plan Frequency: 1-2 times per week for 8-12 weeks      Time In/Out:   Time In: 859  Time Out: 944      PT Visit Info:     Progress Note Due Date: 25  Total # of Visits to Date: 10  Progress Note Counter: 3      Visit Count:  10    OUTPATIENT PHYSICAL THERAPY:   Treatment Note 2025       Episode  (PT: Parkinsons)               Treatment Diagnosis:    Other abnormalities of gait and mobility  Balance disorder  Abnormal posture  Medical/Referring Diagnosis:    Primary parkinsonism (HCC)  Gait disturbance      Referring Physician:  Pete Kimble MD MD Orders:  PT Eval and Treat   Return MD Appt:  25   Date of Onset:  Onset Date:  (May 2024)     Allergies:   Patient has no known allergies.  Restrictions/Precautions:   Fall Precautions:        Interventions Planned (Treatment may consist of any combination of the following):     See Assessment Note    Subjective Comments:  Did some of the bird dog exercises. Feel a little stiff this morning. I still feel unsure of my balance, but bed mobility is getting easier.       Tend to lean forward.  No falls.  Problems:  Rolling over in bed.  Putting on a seatbelt.  Standing up straight.  Standing up from sitting down.   Walking   Initial Pain Level:     0/10   Post Session Pain Level:      0/10   Medications Last Reviewed: 2025  Updated Objective Findings:  None Today  Treatment   THERAPEUTIC EXERCISE: (15 minutes):    Exercises per grid below to improve mobility and strength.

## 2025-04-15 ENCOUNTER — TELEPHONE (OUTPATIENT)
Dept: UROLOGY | Age: 75
End: 2025-04-15

## 2025-04-15 ENCOUNTER — HOSPITAL ENCOUNTER (OUTPATIENT)
Dept: PHYSICAL THERAPY | Age: 75
Setting detail: RECURRING SERIES
Discharge: HOME | End: 2025-04-18
Payer: MEDICARE

## 2025-04-15 PROCEDURE — 97110 THERAPEUTIC EXERCISES: CPT

## 2025-04-15 PROCEDURE — 97112 NEUROMUSCULAR REEDUCATION: CPT

## 2025-04-15 ASSESSMENT — PAIN SCALES - GENERAL: PAINLEVEL_OUTOF10: 0

## 2025-04-15 NOTE — PROGRESS NOTES
Nilay Sprague  : 1950  Primary: Medicare Part A And B (Medicare)  Secondary:  FOR LIFE MEDICARE SUPP [facility] @ [address]  [facility] [address]  Phone: [phone]  Fax: [phone] Plan Frequency: 1-2 times per week for 8-12 weeks    Plan of Care/Certification Expiration Date: 25        Plan of Care/Certification Expiration Date:  Plan of Care/Certification Expiration Date: 25    Frequency/Duration: Plan Frequency: 1-2 times per week for 8-12 weeks      Time In/Out:   Time In: 1120  Time Out: 1205      PT Visit Info:     Progress Note Due Date: 25  Total # of Visits to Date: 11  Progress Note Counter: 4      Visit Count:  11    OUTPATIENT PHYSICAL THERAPY:   Treatment Note 4/15/2025       Episode  (PT: Parkinsons)               Treatment Diagnosis:    Other abnormalities of gait and mobility  Balance disorder  Abnormal posture  Medical/Referring Diagnosis:    Primary parkinsonism (HCC)  Gait disturbance      Referring Physician:  Pete Kimble MD MD Orders:  PT Eval and Treat   Return MD Appt:  25   Date of Onset:  Onset Date:  (May 2024)     Allergies:   Patient has no known allergies.  Restrictions/Precautions:   Fall Precautions:        Interventions Planned (Treatment may consist of any combination of the following):     See Assessment Note    Subjective Comments:  \"My stomach doesn't feel great this morning. I think I'll be okay.'      Tend to lean forward.  No falls.  Problems:  Rolling over in bed.  Putting on a seatbelt.  Standing up straight.  Standing up from sitting down.   Walking   Initial Pain Level:     0/10   Post Session Pain Level:      0/10   Medications Last Reviewed: 4/15/2025  Updated Objective Findings:  None Today  Treatment   THERAPEUTIC EXERCISE: (15 minutes):    Exercises per grid below to improve mobility and strength.  Required minimal verbal cues to promote proper body alignment, promote

## 2025-04-15 NOTE — TELEPHONE ENCOUNTER
Spoke with [employer]. Myrbetriq (the Brand name is preferred) is now approved indefinitely.   Patient advised by voice mail.

## 2025-04-15 NOTE — TELEPHONE ENCOUNTER
----- Message from Dr. Deon Mohamud MD sent at 4/2/2025 10:42 AM EDT -----  This patient is having trouble getting approval for a prescription for Myrbetriq.  I am hoping you could look into this and see if you can render assistance.  His local address is [address] [place] [phone].

## 2025-04-18 ENCOUNTER — HOSPITAL ENCOUNTER (OUTPATIENT)
Dept: PHYSICAL THERAPY | Age: 75
Setting detail: RECURRING SERIES
Discharge: HOME | End: 2025-04-21
Payer: MEDICARE

## 2025-04-18 PROCEDURE — 97110 THERAPEUTIC EXERCISES: CPT

## 2025-04-18 PROCEDURE — 97112 NEUROMUSCULAR REEDUCATION: CPT

## 2025-04-18 ASSESSMENT — PAIN SCALES - GENERAL: PAINLEVEL_OUTOF10: 0

## 2025-04-18 NOTE — PROGRESS NOTES
Nilay Sprague  : 1950  Primary: Medicare Part A And B (Medicare)  Secondary:  FOR LIFE MEDICARE SUPP [facility] @ [address]  [facility] [address]  Phone: [phone]  Fax: [phone] Plan Frequency: 1-2 times per week for 8-12 weeks    Plan of Care/Certification Expiration Date: 25        Plan of Care/Certification Expiration Date:  Plan of Care/Certification Expiration Date: 25    Frequency/Duration: Plan Frequency: 1-2 times per week for 8-12 weeks      Time In/Out:   Time In: 1306  Time Out: 1347      PT Visit Info:     Progress Note Due Date: 25  Total # of Visits to Date: 12  Progress Note Counter: 5      Visit Count:  12    OUTPATIENT PHYSICAL THERAPY:   Treatment Note 2025       Episode  (PT: Parkinsons)               Treatment Diagnosis:    Other abnormalities of gait and mobility  Balance disorder  Abnormal posture  Medical/Referring Diagnosis:    Primary parkinsonism (HCC)  Gait disturbance      Referring Physician:  Pete Kimble MD MD Orders:  PT Eval and Treat   Return MD Appt:  25   Date of Onset:  Onset Date:  (May 2024)     Allergies:   Patient has no known allergies.  Restrictions/Precautions:   Fall Precautions:        Interventions Planned (Treatment may consist of any combination of the following):     See Assessment Note    Subjective Comments:  I feel tired, more tired that normal. I think it's the medication.      Tend to lean forward.  No falls.  Problems:  Rolling over in bed.  Putting on a seatbelt.  Standing up straight.  Standing up from sitting down.   Walking   Initial Pain Level:     0/10   Post Session Pain Level:      0/10   Medications Last Reviewed: 2025  Updated Objective Findings:  None Today  Treatment   THERAPEUTIC EXERCISE: (15 minutes):    Exercises per grid below to improve mobility and strength.  Required minimal verbal cues to promote proper body alignment, promote proper

## 2025-04-22 ENCOUNTER — HOSPITAL ENCOUNTER (OUTPATIENT)
Dept: PHYSICAL THERAPY | Age: 75
Setting detail: RECURRING SERIES
End: 2025-04-22
Payer: MEDICARE

## 2025-04-25 ENCOUNTER — HOSPITAL ENCOUNTER (OUTPATIENT)
Dept: PHYSICAL THERAPY | Age: 75
Setting detail: RECURRING SERIES
Discharge: HOME | End: 2025-04-28
Payer: MEDICARE

## 2025-04-25 PROCEDURE — 97112 NEUROMUSCULAR REEDUCATION: CPT

## 2025-04-25 PROCEDURE — 97110 THERAPEUTIC EXERCISES: CPT

## 2025-04-25 ASSESSMENT — PAIN SCALES - GENERAL: PAINLEVEL_OUTOF10: 0

## 2025-04-25 NOTE — PROGRESS NOTES
Nilay Sprague  : 1950  Primary: Medicare Part A And B (Medicare)  Secondary:  FOR LIFE MEDICARE SUPP [facility] @ [address]  [facility] [address]  Phone: [phone]  Fax: [phone] Plan Frequency: 1-2 times per week for 8-12 weeks    Plan of Care/Certification Expiration Date: 25        Plan of Care/Certification Expiration Date:  Plan of Care/Certification Expiration Date: 25    Frequency/Duration: Plan Frequency: 1-2 times per week for 8-12 weeks      Time In/Out:   Time In: 1300  Time Out: 1345      PT Visit Info:     Progress Note Due Date: 25  Total # of Visits to Date: 13  Progress Note Counter: 6      Visit Count:  13    OUTPATIENT PHYSICAL THERAPY:   Treatment Note 2025       Episode  (PT: Parkinsons)               Treatment Diagnosis:    Other abnormalities of gait and mobility  Balance disorder  Abnormal posture  Medical/Referring Diagnosis:    Primary parkinsonism (HCC)  Gait disturbance      Referring Physician:  Pete Kimble MD MD Orders:  PT Eval and Treat   Return MD Appt:  25   Date of Onset:  Onset Date:  (May 2024)     Allergies:   Patient has no known allergies.  Restrictions/Precautions:   Fall Precautions:        Interventions Planned (Treatment may consist of any combination of the following):     See Assessment Note    Subjective Comments:  Doing okay. Was sick on Tuesday, but nothing else new.       Tend to lean forward.  No falls.  Problems:  Rolling over in bed.  Putting on a seatbelt.  Standing up straight.  Standing up from sitting down.   Walking   Initial Pain Level:     0/10   Post Session Pain Level:      0/10   Medications Last Reviewed: 2025  Updated Objective Findings:  None Today  Treatment   THERAPEUTIC EXERCISE: (20 minutes):    Exercises per grid below to improve mobility and strength.  Required minimal verbal cues to promote proper body alignment, promote proper body

## 2025-04-29 ENCOUNTER — HOSPITAL ENCOUNTER (OUTPATIENT)
Dept: PHYSICAL THERAPY | Age: 75
Setting detail: RECURRING SERIES
Discharge: HOME | End: 2025-05-02
Payer: MEDICARE

## 2025-04-29 PROCEDURE — 97112 NEUROMUSCULAR REEDUCATION: CPT

## 2025-04-29 PROCEDURE — 97110 THERAPEUTIC EXERCISES: CPT

## 2025-04-29 ASSESSMENT — ENCOUNTER SYMPTOMS
ABDOMINAL PAIN: 0
COUGH: 0
VOICE CHANGE: 0

## 2025-04-29 ASSESSMENT — PAIN SCALES - GENERAL: PAINLEVEL_OUTOF10: 0

## 2025-04-29 NOTE — PROGRESS NOTES
complaints.  Communication/Consultation:  None today  Equipment provided today:  None  Recommendations/Intent for next treatment session: Next visit will focus on strengthening, mobility and gait training, balance training.    >Total Treatment Billable Duration:  41 minutes   Time In: 1304  Time Out: 1345     KELLY CERVANTES PT         Charge Capture  Events  Realtime Games Portal  Appt Desk  Attendance Report     Future Appointments   Date Time Provider Department Center   4/30/2025  2:30 PM Pete Kimble MD [facility]   5/6/2025 12:00 PM Kelly Cervantes, PT [facility]   5/9/2025  1:00 PM Kelly Cervantes, PT [facility]   5/13/2025  1:00 PM Rahel Rich, PT [facility]   5/16/2025  1:00 PM Kelly Cervantes, PT [facility]   5/20/2025  1:00 PM Rahel Rich, PT [facility]   5/23/2025  1:00 PM Kelly Cervantes, PT [facility]   5/27/2025  1:00 PM Kelly Cervantes, PT [facility]   5/30/2025  1:00 PM Rahel Rich, PT [facility]   6/10/2025 10:00 AM MAT LAB [facility]   6/17/2025 10:00 AM Cecilio Kimble DO [facility]   6/25/2025  9:30 AM Deon Mohamud MD [id number] [facility]   7/1/2025  2:00 PM PERIPHERAL [facility]   7/8/2025 11:00 AM Kathy Ocampo NP-C [facility]

## 2025-04-30 ENCOUNTER — OFFICE VISIT (OUTPATIENT)
Dept: NEUROLOGY | Age: 75
End: 2025-04-30
Payer: MEDICARE

## 2025-04-30 VITALS
SYSTOLIC BLOOD PRESSURE: 127 MMHG | DIASTOLIC BLOOD PRESSURE: 85 MMHG | HEART RATE: 72 BPM | BODY MASS INDEX: 32.84 KG/M2 | HEIGHT: 68 IN

## 2025-04-30 DIAGNOSIS — G20.A1 PARKINSON'S DISEASE WITHOUT DYSKINESIA OR FLUCTUATING MANIFESTATIONS (HCC): Primary | ICD-10-CM

## 2025-04-30 DIAGNOSIS — G50.0 TRIGEMINAL NEURALGIA OF RIGHT SIDE OF FACE: ICD-10-CM

## 2025-04-30 DIAGNOSIS — R26.9 GAIT DISTURBANCE: ICD-10-CM

## 2025-04-30 DIAGNOSIS — G89.29 CHRONIC BILATERAL LOW BACK PAIN WITHOUT SCIATICA: ICD-10-CM

## 2025-04-30 DIAGNOSIS — M54.50 CHRONIC BILATERAL LOW BACK PAIN WITHOUT SCIATICA: ICD-10-CM

## 2025-04-30 DIAGNOSIS — R49.8 HYPOPHONIA: ICD-10-CM

## 2025-04-30 DIAGNOSIS — R47.89 WORD FINDING DIFFICULTY: ICD-10-CM

## 2025-04-30 PROCEDURE — 99215 OFFICE O/P EST HI 40 MIN: CPT | Performed by: PSYCHIATRY & NEUROLOGY

## 2025-04-30 PROCEDURE — G8417 CALC BMI ABV UP PARAM F/U: HCPCS | Performed by: PSYCHIATRY & NEUROLOGY

## 2025-04-30 PROCEDURE — 1036F TOBACCO NON-USER: CPT | Performed by: PSYCHIATRY & NEUROLOGY

## 2025-04-30 PROCEDURE — 1123F ACP DISCUSS/DSCN MKR DOCD: CPT | Performed by: PSYCHIATRY & NEUROLOGY

## 2025-04-30 PROCEDURE — 1159F MED LIST DOCD IN RCRD: CPT | Performed by: PSYCHIATRY & NEUROLOGY

## 2025-04-30 PROCEDURE — G8427 DOCREV CUR MEDS BY ELIG CLIN: HCPCS | Performed by: PSYCHIATRY & NEUROLOGY

## 2025-04-30 RX ORDER — CARBIDOPA AND LEVODOPA 50; 200 MG/1; MG/1
1 TABLET, EXTENDED RELEASE ORAL 3 TIMES DAILY
Qty: 90 TABLET | Refills: 5 | Status: SHIPPED | OUTPATIENT
Start: 2025-04-30

## 2025-04-30 NOTE — PROGRESS NOTES
Average packs/day: 0.3 packs/day for 17.0 years (4.3 ttl pk-yrs)     Types: Cigarettes, Pipe, Cigars     Start date: 1968     Quit date: 1985     Years since quittin.3     Passive exposure: Never    Smokeless tobacco: Never    Tobacco comments:     Quit smoking: former occasinal cigar AND PIPE use; last use 10 yrs   Vaping Use    Vaping status: Never Used   Substance and Sexual Activity    Alcohol use: Yes     Alcohol/week: 2.0 standard drinks of alcohol     Types: 1 Cans of beer, 1 Drinks containing 0.5 oz of alcohol per week    Drug use: Never    Sexual activity: Not Currently     Partners: Female     Social Drivers of Health     Financial Resource Strain: Low Risk  (2024)    Overall Financial Resource Strain (CARDIA)     Difficulty of Paying Living Expenses: Not hard at all   Food Insecurity: No Food Insecurity (2024)    Hunger Vital Sign     Worried About Running Out of Food in the Last Year: Never true     Ran Out of Food in the Last Year: Never true   Transportation Needs: Unknown (2024)    PRAPARE - Transportation     Lack of Transportation (Non-Medical): No   Physical Activity: Unknown (2024)    Exercise Vital Sign     Days of Exercise per Week: 0 days   Social Connections: Unknown (3/20/2021)    [facility]    Social Connections     Frequency of Communication with Friends and Family: Not asked     Frequency of Social Gatherings with Friends and Family: Not asked   Intimate Partner Violence: Unknown (2024)    [facility], [facility]    Humiliation, Afraid, Rape, and Kick questionnaire     Fear of Current or Ex-Partner: No   Housing Stability: Unknown (2024)    Housing Stability Vital Sign     Homeless in the Last Year: No       Medications/Allergies:     MEDICATIONS:   Outpatient Encounter Medications as of 2025   Medication Sig Dispense Refill    carbidopa-levodopa (SINEMET CR)

## 2025-05-01 ENCOUNTER — APPOINTMENT (OUTPATIENT)
Dept: CT IMAGING | Age: 75
End: 2025-05-01
Payer: MEDICARE

## 2025-05-01 ENCOUNTER — HOSPITAL ENCOUNTER (EMERGENCY)
Age: 75
Discharge: HOME | End: 2025-05-01
Payer: MEDICARE

## 2025-05-01 VITALS
HEIGHT: 68 IN | SYSTOLIC BLOOD PRESSURE: 138 MMHG | BODY MASS INDEX: 32.74 KG/M2 | OXYGEN SATURATION: 97 % | RESPIRATION RATE: 18 BRPM | HEART RATE: 68 BPM | DIASTOLIC BLOOD PRESSURE: 79 MMHG | TEMPERATURE: 98.1 F | WEIGHT: 216 LBS

## 2025-05-01 DIAGNOSIS — S19.9XXA INJURY OF NECK, INITIAL ENCOUNTER: ICD-10-CM

## 2025-05-01 DIAGNOSIS — I71.23 ANEURYSM OF DESCENDING THORACIC AORTA WITHOUT RUPTURE: ICD-10-CM
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
## 2025-05-01 LAB
ANION GAP SERPL CALC-SCNC: 11 MMOL/L (ref 7–16)
BASOPHILS # BLD: 0.05 K/UL (ref 0–0.2)
BASOPHILS NFR BLD: 0.6 % (ref 0–2)
BUN SERPL-MCNC: 23 MG/DL (ref 8–23)
CALCIUM SERPL-MCNC: 8.6 MG/DL (ref 8.8–10.2)
CHLORIDE SERPL-SCNC: 108 MMOL/L (ref 98–107)
CO2 SERPL-SCNC: 23 MMOL/L (ref 20–29)
CREAT SERPL-MCNC: 1 MG/DL (ref 0.8–1.3)
DIFFERENTIAL METHOD BLD: NORMAL
EOSINOPHIL # BLD: 0.04 K/UL (ref 0–0.8)
EOSINOPHIL NFR BLD: 0.5 % (ref 0.5–7.8)
ERYTHROCYTE [DISTWIDTH] IN BLOOD BY AUTOMATED COUNT: 13.3 % (ref 11.9–14.6)
GLUCOSE SERPL-MCNC: 97 MG/DL (ref 70–99)
HCT VFR BLD AUTO: 43.2 % (ref 41.1–50.3)
HGB BLD-MCNC: 14.5 G/DL (ref 13.6–17.2)
IMM GRANULOCYTES # BLD AUTO: 0.02 K/UL (ref 0–0.5)
IMM GRANULOCYTES NFR BLD AUTO: 0.3 % (ref 0–5)
LYMPHOCYTES # BLD: 1.66 K/UL (ref 0.5–4.6)
LYMPHOCYTES NFR BLD: 21.4 % (ref 13–44)
MCH RBC QN AUTO: 29.2 PG (ref 26.1–32.9)
MCHC RBC AUTO-ENTMCNC: 33.6 G/DL (ref 31.4–35)
MCV RBC AUTO: 87.1 FL (ref 82–102)
MONOCYTES # BLD: 0.78 K/UL (ref 0.1–1.3)
MONOCYTES NFR BLD: 10.1 % (ref 4–12)
NEUTS SEG # BLD: 5.2 K/UL (ref 1.7–8.2)
NEUTS SEG NFR BLD: 67.1 % (ref 43–78)
NRBC # BLD: 0 K/UL (ref 0–0.2)
PLATELET # BLD AUTO: 229 K/UL (ref 150–450)
PMV BLD AUTO: 10.2 FL (ref 9.4–12.3)
POTASSIUM SERPL-SCNC: 4.3 MMOL/L (ref 3.5–5.1)
RBC # BLD AUTO: 4.96 M/UL (ref 4.23–5.6)
SODIUM SERPL-SCNC: 142 MMOL/L (ref 136–145)
WBC # BLD AUTO: 7.8 K/UL (ref 4.3–11.1)

## 2025-05-01 PROCEDURE — 6360000004 HC RX CONTRAST MEDICATION

## 2025-05-01 PROCEDURE — 99285 EMERGENCY DEPT VISIT HI MDM: CPT

## 2025-05-01 PROCEDURE — 90714 TD VACC NO PRESV 7 YRS+ IM: CPT

## 2025-05-01 PROCEDURE — 70450 CT HEAD/BRAIN W/O DYE: CPT

## 2025-05-01 PROCEDURE — 72125 CT NECK SPINE W/O DYE: CPT

## 2025-05-01 PROCEDURE — 85025 COMPLETE CBC W/AUTO DIFF WBC: CPT

## 2025-05-01 PROCEDURE — 80048 BASIC METABOLIC PNL TOTAL CA: CPT

## 2025-05-01 PROCEDURE — 70491 CT SOFT TISSUE NECK W/DYE: CPT

## 2025-05-01 PROCEDURE — 6360000002 HC RX W HCPCS

## 2025-05-01 PROCEDURE — 90471 IMMUNIZATION ADMIN: CPT

## 2025-05-01 RX ORDER — IOPAMIDOL 755 MG/ML
80 INJECTION, SOLUTION INTRAVASCULAR
Status: COMPLETED | OUTPATIENT
Start: 2025-05-01 | End: 2025-05-01

## 2025-05-01 RX ADMIN — CLOSTRIDIUM TETANI TOXOID ANTIGEN (FORMALDEHYDE INACTIVATED) AND CORYNEBACTERIUM DIPHTHERIAE TOXOID ANTIGEN (FORMALDEHYDE INACTIVATED) 0.5 ML: 5; 2 INJECTION, SUSPENSION INTRAMUSCULAR at 14:51

## 2025-05-01 RX ADMIN — IOPAMIDOL 80 ML: 755 INJECTION, SOLUTION INTRAVENOUS at 14:30

## 2025-05-01 ASSESSMENT — PAIN - FUNCTIONAL ASSESSMENT: PAIN_FUNCTIONAL_ASSESSMENT: 0-10

## 2025-05-01 ASSESSMENT — PAIN SCALES - GENERAL
PAINLEVEL_OUTOF10: 0
PAINLEVEL_OUTOF10: 2

## 2025-05-01 ASSESSMENT — LIFESTYLE VARIABLES
HOW MANY STANDARD DRINKS CONTAINING ALCOHOL DO YOU HAVE ON A TYPICAL DAY: PATIENT DOES NOT DRINK
HOW OFTEN DO YOU HAVE A DRINK CONTAINING ALCOHOL: NEVER

## 2025-05-01 ASSESSMENT — PAIN DESCRIPTION - LOCATION: LOCATION: JAW

## 2025-05-01 ASSESSMENT — PAIN DESCRIPTION - PAIN TYPE: TYPE: ACUTE PAIN

## 2025-05-01 NOTE — ED NOTES
Patient mobility status  with difficulty, uses a wheelchair .     I have reviewed discharge instructions with the patient.  The patient verbalized understanding.    Patient left ED via Discharge Method: wheelchair to Home with Spouse and Child.    Opportunity for questions and clarification provided.     Patient given 0 scripts.           Liset Johnson RN  05/01/25 3042

## 2025-05-01 NOTE — DISCHARGE INSTRUCTIONS
He has been seen for his fall. It does look he has a small contusion to his neck but no other traumatic injury. It did show his thoracic aorta is a little enlarged. I am placing a referral to vascular surgery for follow-up. Otherwise, please have him follow-up with his primary care physician. He can take Ibuprofen and Tylenol as needed for pain.  Return if he has any worsening symptoms or any new concerns or.

## 2025-05-01 NOTE — ED PROVIDER NOTES
Emergency Department Provider Note       D EMERGENCY DEPT   PCP: Cecilio Kimble DO   Age: 74 y.o.   Sex: male     DISPOSITION Decision To Discharge 05/01/2025 03:43:35 PM    ICD-10-CM    1. Fall, initial encounter  W19.XXXA       2. Injury of neck, initial encounter  S19.9XXA       3. 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    Patient presents after a rolling over in bed, injuring his left neck, no soft or hard signs of vascular injury.  There is a small superficial abrasion versus superficial laceration to the neck.  Does not Or appear to extend any deeper, warranting sutures.    CT imaging of the head, soft tissue of the neck and C-spine are all negative for significant traumatic injury.  There is a mild fat stranding in the neck but no extravasation or hematoma.  Incidentally noted ascending thoracic aortic aneurysm which patient already knows of.    Otherwise no further evaluation needed in the emergency department, discharged home for supportive treatment at home     1 acute, uncomplicated illness or injury.  Over the counter drug management performed.  Prescription drug management performed.  Chronic medical problems impacting care include Parkinson's and on Eliquis.  I independently ordered and reviewed each unique test.           I interpreted the CT Scan fat stranding seen in the neck but no hematoma or extravasation.              History     74-year-old male with history of PE in the past, now on Eliquis that presents after a fall with head/neck injury.  He was laying in bed when he felt like he rolled out of bed.  He did strike his left neck against the nightstand.  Unsure of any loss of consciousness.  Has a small abrasion to the left neck

## 2025-05-01 NOTE — ED TRIAGE NOTES
Pt 73 y/o male arrives via [facility] ems from home with a complaint of a fall from the bed to the floor and hit his head on the nightstand. Pt is unsure of LOC. Pt reports taking 2.5mg of eliquis twice a day

## 2025-05-02 ENCOUNTER — APPOINTMENT (OUTPATIENT)
Dept: PHYSICAL THERAPY | Age: 75
End: 2025-05-02
Payer: MEDICARE

## 2025-05-06 ENCOUNTER — HOSPITAL ENCOUNTER (OUTPATIENT)
Dept: PHYSICAL THERAPY | Age: 75
Setting detail: RECURRING SERIES
Discharge: HOME | End: 2025-05-09
Payer: MEDICARE

## 2025-05-06 PROCEDURE — 97112 NEUROMUSCULAR REEDUCATION: CPT

## 2025-05-06 PROCEDURE — 97110 THERAPEUTIC EXERCISES: CPT

## 2025-05-06 ASSESSMENT — PAIN SCALES - GENERAL: PAINLEVEL_OUTOF10: 1

## 2025-05-06 ASSESSMENT — PAIN DESCRIPTION - LOCATION: LOCATION: JAW

## 2025-05-06 NOTE — PROGRESS NOTES
Nilay Sprague  : 1950  Primary: Medicare Part A And B (Medicare)  Secondary:  FOR LIFE MEDICARE SUPP [facility] @ [address]  [facility] [address]  Phone: [phone]  Fax: [phone] Plan Frequency: 1-2 times per week for 8-12 weeks    Plan of Care/Certification Expiration Date: 25        Plan of Care/Certification Expiration Date:  Plan of Care/Certification Expiration Date: 25    Frequency/Duration: Plan Frequency: 1-2 times per week for 8-12 weeks      Time In/Out:   Time In: 1200  Time Out: 1239      PT Visit Info:     Progress Note Due Date: 25  Total # of Visits to Date: 15  Progress Note Counter: 8      Visit Count:  15    OUTPATIENT PHYSICAL THERAPY:   Treatment Note 2025       Episode  (PT: Parkinsons)               Treatment Diagnosis:    Other abnormalities of gait and mobility  Balance disorder  Abnormal posture  Medical/Referring Diagnosis:    Primary parkinsonism (HCC)  Gait disturbance      Referring Physician:  Pete Kimble MD MD Orders:  PT Eval and Treat   Return MD Appt:  25   Date of Onset:  Onset Date:  (May 2024)     Allergies:   Patient has no known allergies.  Restrictions/Precautions:   Fall Precautions:        Interventions Planned (Treatment may consist of any combination of the following):     See Assessment Note    Subjective Comments:  Patient reports he fell out of the bed last Thursday.   \"I was a sleep and some how ended up on the floor.  My jaw hit the nightstand.  I went and got a CT scan at ER\".        Tend to lean forward.  No falls.  Problems:  Rolling over in bed.  Putting on a seatbelt.  Standing up straight.  Standing up from sitting down.   Walking   Initial Pain Level:   Jaw 1/10   Post Session Pain Level:    Jaw 1/10   Medications Last Reviewed: 2025  Updated Objective Findings:  None Today  Treatment   THERAPEUTIC EXERCISE: ( 14 minutes):    Exercises per grid below to

## 2025-05-07 ENCOUNTER — TELEPHONE (OUTPATIENT)
Dept: CARDIOTHORACIC SURGERY | Age: 75
End: 2025-05-07

## 2025-05-07 NOTE — TELEPHONE ENCOUNTER
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 Left voicemail to call the office.

## 2025-05-09 ENCOUNTER — HOSPITAL ENCOUNTER (OUTPATIENT)
Dept: PHYSICAL THERAPY | Age: 75
Setting detail: RECURRING SERIES
Discharge: HOME | End: 2025-05-12
Payer: MEDICARE

## 2025-05-09 PROCEDURE — 97112 NEUROMUSCULAR REEDUCATION: CPT

## 2025-05-09 PROCEDURE — 97110 THERAPEUTIC EXERCISES: CPT

## 2025-05-09 ASSESSMENT — PAIN SCALES - GENERAL: PAINLEVEL_OUTOF10: 0

## 2025-05-09 NOTE — PROGRESS NOTES
Nilay Sprague  : 1950  Primary: Medicare Part A And B (Medicare)  Secondary:  FOR LIFE MEDICARE SUPP [facility] @ [address]  [facility] [address]  Phone: [phone]  Fax: [phone] Plan Frequency: 1-2 times per week for 8-12 weeks    Plan of Care/Certification Expiration Date: 25        Plan of Care/Certification Expiration Date:  Plan of Care/Certification Expiration Date: 25    Frequency/Duration: Plan Frequency: 1-2 times per week for 8-12 weeks      Time In/Out:   Time In: 1310  Time Out: 1345      PT Visit Info:     Progress Note Due Date: 25  Total # of Visits to Date: 16  Progress Note Counter: 1      Visit Count:  16    OUTPATIENT PHYSICAL THERAPY:   Treatment Note 2025       Episode  (PT: Parkinsons)               Treatment Diagnosis:    Other abnormalities of gait and mobility  Balance disorder  Abnormal posture  Medical/Referring Diagnosis:    Primary parkinsonism (HCC)  Gait disturbance      Referring Physician:  Pete Kimble MD MD Orders:  PT Eval and Treat   Return MD Appt:  25   Date of Onset:  Onset Date:  (May 2024)     Allergies:   Patient has no known allergies.  Restrictions/Precautions:   Fall Precautions:        Interventions Planned (Treatment may consist of any combination of the following):     See Assessment Note    Subjective Comments:  Patient reports his eyes are burning today.  Patient did yard work yesterday and it irritated his allergies.  No falls.  Patient reports MD increased carbidopa levodopa at his last appointment.   Patient arrived late to his appointment.       Tend to lean forward.  No falls.  Problems:  Rolling over in bed.  Putting on a seatbelt.  Standing up straight.  Standing up from sitting down.   Walking   Initial Pain Level:     0/10   Post Session Pain Level:      0/10   Medications Last Reviewed: 2025  Updated Objective Findings:  See Progress Note from 
pain, Erectile dysfunction, Headache, History of elevated PSA, History of kidney stones, Parkinson's disease (HCC), Pulmonary embolism (HCC), Sleep apnea, Trigeminal neuralgia, and Urinary incontinence.  Mr. Sprague  has a past surgical history that includes heent; Cataract removal (Bilateral); orthopedic surgery (Right, 2012, 2014); Colonoscopy; Tonsillectomy and adenoidectomy; Prostate surgery (2015); TURP (02/08/2024); thrombectomy (02/20/2024); Cystoscopy (03/05/2024); joint replacement (3.1.2021); and eye surgery.  Social History/Living Environment:   Patient lives with their spouse  Type of Home: House: One Story  Level of Assistance: Rehab: Modified independent  Assistive Device: Devices: straight cane  3 steps in back door; 15 steps into house in [place].   Prior Level of Function/Work/Activity:   Current Level of Function: Mod Independent   Employment Status: Retired  Occupation: [employer]      Learning:   Does the patient/guardian have any barriers to learning?: No barriers  Will there be a co-learner?: Yes  Co-learner name (if applicable): wife  Does the co-learner have any barriers to learning?: No barriers  What is the preferred language of the patient/guardian?: English  What is the preferred language of the co-learner?: English  Is an  required?: No  How does the patient/guardian prefer to learn new concepts?: Reading; Demonstration; Pictures/Videos  How does the co-learner prefer to learn new concepts?: Listening; Reading; Demonstration; Pictures/Videos    Fall Risk Scale:   Rangel Total Score: 25    Personal Factors:        Sex:  male        Age:  74 y.o.     OBJECTIVE   Observations:     Forward head and forward bent posture; standing against wall, head 4.5 inches from wall     Current Level of Function:   Mod I  Functional Mobility:    Min A with rolling; sit<>stand with UE assist  Bed Mobility:   Min A with rolling; slow transition sit<>supine  Transfers:   Sit<>stand with UE

## 2025-05-13 ENCOUNTER — TELEPHONE (OUTPATIENT)
Dept: INTERNAL MEDICINE CLINIC | Facility: CLINIC | Age: 75
End: 2025-05-13

## 2025-05-13 ENCOUNTER — OFFICE VISIT (OUTPATIENT)
Dept: FAMILY MEDICINE CLINIC | Facility: CLINIC | Age: 75
End: 2025-05-13
Payer: MEDICARE

## 2025-05-13 ENCOUNTER — HOSPITAL ENCOUNTER (OUTPATIENT)
Dept: PHYSICAL THERAPY | Age: 75
Setting detail: RECURRING SERIES
End: 2025-05-13
Payer: MEDICARE

## 2025-05-13 VITALS
BODY MASS INDEX: 33.34 KG/M2 | TEMPERATURE: 97.7 F | RESPIRATION RATE: 18 BRPM | SYSTOLIC BLOOD PRESSURE: 120 MMHG | OXYGEN SATURATION: 96 % | DIASTOLIC BLOOD PRESSURE: 78 MMHG | HEIGHT: 68 IN | WEIGHT: 220 LBS | HEART RATE: 66 BPM

## 2025-05-13 DIAGNOSIS — R06.2 WHEEZING: Primary | ICD-10-CM

## 2025-05-13 DIAGNOSIS — J20.9 ACUTE BRONCHITIS, UNSPECIFIED ORGANISM: ICD-10-CM

## 2025-05-13 LAB
INFLUENZA A ANTIGEN, POC: NEGATIVE
INFLUENZA B ANTIGEN, POC: NEGATIVE
LOT EXPIRE DATE: NORMAL
LOT KIT NUMBER: NORMAL
SARS-COV-2 RNA, POC: NEGATIVE
VALID INTERNAL CONTROL: YES
VENDOR AND KIT NAME POC: NORMAL

## 2025-05-13 PROCEDURE — 1123F ACP DISCUSS/DSCN MKR DOCD: CPT | Performed by: FAMILY MEDICINE

## 2025-05-13 PROCEDURE — 1159F MED LIST DOCD IN RCRD: CPT | Performed by: FAMILY MEDICINE

## 2025-05-13 PROCEDURE — G8427 DOCREV CUR MEDS BY ELIG CLIN: HCPCS | Performed by: FAMILY MEDICINE

## 2025-05-13 PROCEDURE — 87428 SARSCOV & INF VIR A&B AG IA: CPT | Performed by: FAMILY MEDICINE

## 2025-05-13 PROCEDURE — G8417 CALC BMI ABV UP PARAM F/U: HCPCS | Performed by: FAMILY MEDICINE

## 2025-05-13 PROCEDURE — 1036F TOBACCO NON-USER: CPT | Performed by: FAMILY MEDICINE

## 2025-05-13 PROCEDURE — 99214 OFFICE O/P EST MOD 30 MIN: CPT | Performed by: FAMILY MEDICINE

## 2025-05-13 RX ORDER — PREDNISONE 10 MG/1
30 TABLET ORAL DAILY
Qty: 15 TABLET | Refills: 0 | Status: SHIPPED | OUTPATIENT
Start: 2025-05-13 | End: 2025-05-18

## 2025-05-13 RX ORDER — LEVOFLOXACIN 500 MG/1
500 TABLET, FILM COATED ORAL DAILY
Qty: 5 TABLET | Refills: 0 | Status: SHIPPED | OUTPATIENT
Start: 2025-05-13 | End: 2025-05-18

## 2025-05-13 RX ORDER — ALBUTEROL SULFATE 90 UG/1
2 INHALANT RESPIRATORY (INHALATION) 4 TIMES DAILY
Qty: 18 G | Refills: 3 | Status: SHIPPED | OUTPATIENT
Start: 2025-05-13

## 2025-05-13 SDOH — ECONOMIC STABILITY: FOOD INSECURITY: WITHIN THE PAST 12 MONTHS, THE FOOD YOU BOUGHT JUST DIDN'T LAST AND YOU DIDN'T HAVE MONEY TO GET MORE.: NEVER TRUE

## 2025-05-13 SDOH — ECONOMIC STABILITY: FOOD INSECURITY: WITHIN THE PAST 12 MONTHS, YOU WORRIED THAT YOUR FOOD WOULD RUN OUT BEFORE YOU GOT MONEY TO BUY MORE.: NEVER TRUE

## 2025-05-13 ASSESSMENT — ENCOUNTER SYMPTOMS
COUGH: 1
WHEEZING: 1

## 2025-05-13 ASSESSMENT — PATIENT HEALTH QUESTIONNAIRE - PHQ9
1. LITTLE INTEREST OR PLEASURE IN DOING THINGS: NOT AT ALL
SUM OF ALL RESPONSES TO PHQ QUESTIONS 1-9: 0
2. FEELING DOWN, DEPRESSED OR HOPELESS: NOT AT ALL

## 2025-05-13 NOTE — PATIENT INSTRUCTIONS
Please follow-up with Dr. Kimble in about 2 weeks for recheck.  Return sooner if you have any worsening or concerning issues or proceed directly to the emergency department for further evaluation.  Best wishes, Dr. Rocha

## 2025-05-13 NOTE — ASSESSMENT & PLAN NOTE
Patient is not a smoker and has no chronic lung disease.  Will get him an inhaler as well as some prednisone and Levaquin 500 mg for 5 days to help clear this up

## 2025-05-13 NOTE — PROGRESS NOTES
Nilay Sprague (:  1950) is a 74 y.o. male,Established patient, here for evaluation of the following chief complaint(s):  Congestion, Wheezing (Began a few days ago ), and Cough         Assessment & Plan  Wheezing  Stable and mild with no respiratory distress.    Orders:    AMB POC SARS-COV-2 AND INFLUENZA A/B    XR CHEST (2 VW); Future    AMB POC COVID-19 & INFLUENZA A/B    Acute bronchitis, unspecified organism  Patient is not a smoker and has no chronic lung disease.  Will get him an inhaler as well as some prednisone and Levaquin 500 mg for 5 days to help clear this up           Return in about 2 weeks (around 2025) for recheck with Dr. Kimble.       Subjective   Coughing and noct wheezing for several days.  Some phlem.  No f/c.  No n/v.    Wheezing   Associated symptoms include coughing.   Cough  Associated symptoms include wheezing.       Review of Systems   Respiratory:  Positive for cough and wheezing.           Objective   Physical Exam alert in no distress.  The patient has a little wheezing in his lung bases bilaterally posteriorly particularly on the left side along with some mild rhonchi.  Heart tones normal.  Oropharynx was normal and there was no cervical adenopathy palpable.  Chest x-ray to my eyes did not show any acute infiltrate much less consolidation             An electronic signature was used to authenticate this note.    --Vern Rocha MD

## 2025-05-13 NOTE — ASSESSMENT & PLAN NOTE
Stable and mild with no respiratory distress.    Orders:    AMB POC SARS-COV-2 AND INFLUENZA A/B    XR CHEST (2 VW); Future    AMB POC COVID-19 & INFLUENZA A/B

## 2025-05-13 NOTE — TELEPHONE ENCOUNTER
----- Message from Samuel CLEMENT sent at 5/13/2025  8:33 AM EDT -----  Regarding: ECC Escalation To Practice  ECC Escalation To Practice      Type of Escalation: Acute Care Symptom  --------------------------------------------------------------------------------------------------------------------------    Information for Provider:  Patient is looking for appointment for: Symptom Chest congestion, Wheezing  Reasons for Message: Unable to reach practice     Additional Information Patient called in to book and appointment because he is experiencing wheezing and chest congestion. Unable to reach practice.  --------------------------------------------------------------------------------------------------------------------------    Relationship to Patient: Self  Call Back Info: OK to leave message on voicemail  Preferred Call Back Number: Phone [phone]

## 2025-05-16 ENCOUNTER — HOSPITAL ENCOUNTER (OUTPATIENT)
Dept: PHYSICAL THERAPY | Age: 75
Setting detail: RECURRING SERIES
Discharge: HOME | End: 2025-05-19
Payer: MEDICARE

## 2025-05-16 PROCEDURE — 97112 NEUROMUSCULAR REEDUCATION: CPT

## 2025-05-16 PROCEDURE — 97110 THERAPEUTIC EXERCISES: CPT

## 2025-05-16 ASSESSMENT — PAIN DESCRIPTION - ORIENTATION: ORIENTATION: RIGHT

## 2025-05-16 ASSESSMENT — PAIN DESCRIPTION - LOCATION: LOCATION: LEG

## 2025-05-16 ASSESSMENT — PAIN SCALES - GENERAL: PAINLEVEL_OUTOF10: 1

## 2025-05-16 ASSESSMENT — PAIN DESCRIPTION - DESCRIPTORS: DESCRIPTORS: ACHING

## 2025-05-16 NOTE — PROGRESS NOTES
Nilay Sprague  : 1950  Primary: Medicare Part A And B (Medicare)  Secondary:  FOR LIFE MEDICARE SUPP [facility] @ [address]  [facility] [address]  Phone: [phone]  Fax: [phone] Plan Frequency: 1-2 times per week for 8-12 weeks    Plan of Care/Certification Expiration Date: 25        Plan of Care/Certification Expiration Date:  Plan of Care/Certification Expiration Date: 25    Frequency/Duration: Plan Frequency: 1-2 times per week for 8-12 weeks      Time In/Out:   Time In: 1300  Time Out: 1342      PT Visit Info:     Progress Note Due Date: 25  Total # of Visits to Date: 17  Progress Note Counter: 2      Visit Count:  17    OUTPATIENT PHYSICAL THERAPY:   Treatment Note 2025       Episode  (PT: Parkinsons)               Treatment Diagnosis:    Other abnormalities of gait and mobility  Balance disorder  Abnormal posture  Medical/Referring Diagnosis:    Primary parkinsonism (HCC)  Gait disturbance      Referring Physician:  Pete Kimble MD MD Orders:  PT Eval and Treat   Return MD Appt:  25   Date of Onset:  Onset Date:  (May 2024)     Allergies:   Patient has no known allergies.  Restrictions/Precautions:   Fall Precautions:        Interventions Planned (Treatment may consist of any combination of the following):     See Assessment Note    Subjective Comments:  Patient reports he has an inhaler, prednisone, and antibiotics due to bronchitis.   \"My right calf started cramping when I was walking up here for some reason\" .       Tend to lean forward.  No falls.  Problems:  Rolling over in bed.  Putting on a seatbelt.  Standing up straight.  Standing up from sitting down.   Walking   Initial Pain Level: Right Leg 1/10   Post Session Pain Level: Right  Leg 1/10   Medications Last Reviewed: 2025  Updated Objective Findings:  None Today  Treatment   THERAPEUTIC EXERCISE: ( 24 minutes):    Exercises per grid below to

## 2025-05-20 ENCOUNTER — HOSPITAL ENCOUNTER (OUTPATIENT)
Dept: PHYSICAL THERAPY | Age: 75
Setting detail: RECURRING SERIES
Discharge: HOME | End: 2025-05-23
Payer: MEDICARE

## 2025-05-20 PROCEDURE — 97112 NEUROMUSCULAR REEDUCATION: CPT

## 2025-05-20 PROCEDURE — 97110 THERAPEUTIC EXERCISES: CPT

## 2025-05-20 ASSESSMENT — PAIN DESCRIPTION - ORIENTATION: ORIENTATION: RIGHT;LEFT

## 2025-05-20 ASSESSMENT — PAIN DESCRIPTION - LOCATION: LOCATION: LEG

## 2025-05-20 ASSESSMENT — PAIN DESCRIPTION - DESCRIPTORS: DESCRIPTORS: ACHING

## 2025-05-20 ASSESSMENT — PAIN SCALES - GENERAL: PAINLEVEL_OUTOF10: 1

## 2025-05-20 NOTE — PROGRESS NOTES
Nilay Sprague  : 1950  Primary: Medicare Part A And B (Medicare)  Secondary:  FOR LIFE MEDICARE SUPP [facility] @ [address]  [facility] [address]  Phone: [phone]  Fax: [phone] Plan Frequency: 1-2 times per week for 8-12 weeks    Plan of Care/Certification Expiration Date: 25        Plan of Care/Certification Expiration Date:  Plan of Care/Certification Expiration Date: 25    Frequency/Duration: Plan Frequency: 1-2 times per week for 8-12 weeks      Time In/Out:   Time In: 1258  Time Out: 1345      PT Visit Info:     Progress Note Due Date: 25  Total # of Visits to Date: 18  Progress Note Counter: 3      Visit Count:  18    OUTPATIENT PHYSICAL THERAPY:   Treatment Note 2025       Episode  (PT: Parkinsons)               Treatment Diagnosis:    Other abnormalities of gait and mobility  Balance disorder  Abnormal posture  Medical/Referring Diagnosis:    Primary parkinsonism (HCC)  Gait disturbance      Referring Physician:  Pete Kimble MD MD Orders:  PT Eval and Treat   Return MD Appt:  25   Date of Onset:  Onset Date:  (May 2024)     Allergies:   Patient has no known allergies.  Restrictions/Precautions:   Fall Precautions:        Interventions Planned (Treatment may consist of any combination of the following):     See Assessment Note    Subjective Comments:  Had a chest cold last week. Feeling better.       Tend to lean forward.  No falls.  Problems:  Rolling over in bed.  Putting on a seatbelt.  Standing up straight.  Standing up from sitting down.   Walking   Initial Pain Level: Right, Left Leg 1/10   Post Session Pain Level: Right, Left  Leg 1/10   Medications Last Reviewed: 2025  Updated Objective Findings:  None Today  Treatment   THERAPEUTIC EXERCISE: ( 20 minutes):    Exercises per grid below to improve mobility and strength.  Required minimal verbal cues to promote proper body alignment, promote

## 2025-05-23 ENCOUNTER — HOSPITAL ENCOUNTER (OUTPATIENT)
Dept: PHYSICAL THERAPY | Age: 75
Setting detail: RECURRING SERIES
Discharge: HOME | End: 2025-05-26
Payer: MEDICARE

## 2025-05-23 PROCEDURE — 97110 THERAPEUTIC EXERCISES: CPT

## 2025-05-23 PROCEDURE — 97112 NEUROMUSCULAR REEDUCATION: CPT

## 2025-05-23 ASSESSMENT — PAIN SCALES - GENERAL: PAINLEVEL_OUTOF10: 0

## 2025-05-23 NOTE — PROGRESS NOTES
Nilay Sprague  : 1950  Primary: Medicare Part A And B (Medicare)  Secondary:  FOR LIFE MEDICARE SUPP [facility] @ [address]  [facility] [address]  Phone: [phone]  Fax: [phone] Plan Frequency: 1-2 times per week for 8-12 weeks    Plan of Care/Certification Expiration Date: 25        Plan of Care/Certification Expiration Date:  Plan of Care/Certification Expiration Date: 25    Frequency/Duration: Plan Frequency: 1-2 times per week for 8-12 weeks      Time In/Out:   Time In: 1300  Time Out: 1345      PT Visit Info:     Progress Note Due Date: 25  Total # of Visits to Date: 19  Progress Note Counter: 4      Visit Count:  19    OUTPATIENT PHYSICAL THERAPY:   Treatment Note 2025       Episode  (PT: Parkinsons)               Treatment Diagnosis:    Other abnormalities of gait and mobility  Balance disorder  Abnormal posture  Medical/Referring Diagnosis:    Primary parkinsonism (HCC)  Gait disturbance      Referring Physician:  Pete Kimble MD MD Orders:  PT Eval and Treat   Return MD Appt:  25   Date of Onset:  Onset Date:  (May 2024)     Allergies:   Patient has no known allergies.  Restrictions/Precautions:   Fall Precautions:        Interventions Planned (Treatment may consist of any combination of the following):     See Assessment Note    Subjective Comments:  Patient reports having a busy morning.  \"I went to get my car's oil changed this morning\".       Tend to lean forward.  No falls.  Problems:  Rolling over in bed.  Putting on a seatbelt.  Standing up straight.  Standing up from sitting down.   Walking   Initial Pain Level:     0/10   Post Session Pain Level:      0/10   Medications Last Reviewed: 2025  Updated Objective Findings:  None Today  Treatment   THERAPEUTIC EXERCISE: ( 20 minutes):    Exercises per grid below to improve mobility and strength.  Required minimal verbal cues to promote proper body

## 2025-05-27 ENCOUNTER — HOSPITAL ENCOUNTER (OUTPATIENT)
Dept: PHYSICAL THERAPY | Age: 75
Setting detail: RECURRING SERIES
Discharge: HOME | End: 2025-05-30
Payer: MEDICARE

## 2025-05-27 ENCOUNTER — OFFICE VISIT (OUTPATIENT)
Age: 75
End: 2025-05-27
Payer: MEDICARE

## 2025-05-27 DIAGNOSIS — R49.8 HYPOPHONIA: ICD-10-CM

## 2025-05-27 DIAGNOSIS — R47.89 WORD FINDING DIFFICULTY: ICD-10-CM

## 2025-05-27 DIAGNOSIS — G20.A1 PARKINSON'S DISEASE WITHOUT DYSKINESIA OR FLUCTUATING MANIFESTATIONS (HCC): Primary | ICD-10-CM

## 2025-05-27 PROCEDURE — 97110 THERAPEUTIC EXERCISES: CPT

## 2025-05-27 PROCEDURE — 92524 BEHAVRAL QUALIT ANALYS VOICE: CPT | Performed by: SPEECH-LANGUAGE PATHOLOGIST

## 2025-05-27 PROCEDURE — 97112 NEUROMUSCULAR REEDUCATION: CPT

## 2025-05-27 ASSESSMENT — PAIN SCALES - GENERAL: PAINLEVEL_OUTOF10: 0

## 2025-05-27 NOTE — PROGRESS NOTES
tablet 2    senna (SENOKOT) 8.6 MG tablet Take 1 tablet by mouth daily       No current facility-administered medications on file prior to visit.            INITIAL ASSESSMENT DATE: 05/27/25    PROBLEM LIST (Impairments causing functional limitations):  Dysphonia  INTERVENTIONS PLANNED: (Treatment may consist of any combination of the following)    [x] Speech/Lang Therapy  33441 [] Dysphagia Therapy  68836   [] TherIvntj Cog Funcj Contact (1st 15 min)  18932 [] TherIvntj Cog Funcj Contact (addt'l 15 min)  02828          Based on the objective data described below,  presents with a mild-moderate voice disorder characterized by  hoarseness and breathiness, decreased vocal range and  decreased prosody and intonation.  In conversational speech, the pt's dB ranged from 72-60 with an average of 65.  Volume increased to an average of 68 with oral reading of \"The Grandfather Passage\".  When asked to produce sustained \"ah\", maximum duration was 27 seconds at 67 dB.  When asked to produce a loud \"ah\" duration decreased to 15 seconds with an increased in dB to 81. 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  Individual's ability to participate in vocational, avocational, and social activities requiring voice is rarely affected in low vocal demand activities, but is occasionally affected in high vocal demand activities.    PRESENTING COMPLAINT(S):  [x]

## 2025-05-27 NOTE — PROGRESS NOTES
body mechanics.  Progressed resistance, range, repetitions, and complexity of movement as indicated.   Date  5/27/25   Activity/Exercise    Standing against wall  Working on upright posture: 1) with B shoulder ER orange band 2 x 10 reps B         Seated edge of mat \"putting on seatbelt\"    Nustep  To address muscular imbalance with progressive resistance  Advancement of increased activity tolerance  Level 4 x 10 minutes   Sit<>stand From mat table 2 x 10 reps on blue foams with no UE assist   Boxing    standing rows Black tubing 2 x 10 reps B   Standing B pull downs Black tubing 2 x 10 reps B   Gastroc stretch on incline board    Seated back extension    Chin tucks    Supine hooklying lower body rotation    Bridging on ball    Bed mobility    Quadriped thoracic rotation AROM    Bird dog modified    PWR! Supine    Supine hooklying core press    Pt education    PWR! Standing        NEUROMUSCULAR RE-EDUCATION: (25 minutes):    Exercise/activities per grid below to improve balance, coordination, kinesthetic sense, posture, and proprioception.  Required minimal verbal cues to promote static and dynamic balance in standing.  Activity   Date   5/27/25   Activity/Exercise      Walking with head turns        Walking with head up & down        Step ups     6 inch step x 10 reps each leg with SBA, no rail   Step overs Over blue foam x 10 reps each leg fwd and laterally with no UE assist   Step taps     6 inch step x 10 reps each leg fwd and cross with SBA with no UE assist.    Marching      Sidestepping      Crossovers      Dent      Walking  backwards        Tandem walking      Weaving in/out of cones        Brain chart #1       Standing reaching for random targets on wall        Ball toss      Kick ball      Figure 8s       Circles right/left      Walking with 360 degree turns      Spirals      Weight shifting:    Left & Right     On rockerboard.    Weight shifting:  Forward & Backward      On rockerboard.    Static

## 2025-05-28 ENCOUNTER — TELEPHONE (OUTPATIENT)
Dept: INTERNAL MEDICINE CLINIC | Facility: CLINIC | Age: 75
End: 2025-05-28

## 2025-05-28 DIAGNOSIS — R39.12 BENIGN PROSTATIC HYPERPLASIA WITH WEAK URINARY STREAM: Primary | ICD-10-CM

## 2025-05-28 DIAGNOSIS — N40.1 BENIGN PROSTATIC HYPERPLASIA WITH WEAK URINARY STREAM: Primary | ICD-10-CM

## 2025-05-28 DIAGNOSIS — R73.01 IMPAIRED FASTING GLUCOSE: ICD-10-CM

## 2025-05-28 DIAGNOSIS — E78.00 ELEVATED LDL CHOLESTEROL LEVEL: ICD-10-CM

## 2025-05-28 NOTE — TELEPHONE ENCOUNTER
Orders Placed This Encounter    CBC with Auto Differential     Standing Status:   Future     Expected Date:   5/28/2025     Expiration Date:   5/28/2026    Comprehensive Metabolic Panel     Standing Status:   Future     Expected Date:   5/28/2025     Expiration Date:   11/28/2025    Lipid Panel     Standing Status:   Future     Expected Date:   5/28/2025     Expiration Date:   11/28/2025    TSH     Standing Status:   Future     Expected Date:   5/28/2025     Expiration Date:   5/28/2026    Urinalysis     Standing Status:   Future     Expected Date:   5/28/2025     Expiration Date:   5/28/2026    Hemoglobin A1C     Standing Status:   Future     Expected Date:   5/28/2025     Expiration Date:   11/28/2025

## 2025-05-28 NOTE — TELEPHONE ENCOUNTER
----- Message from UMESH MCMANUS LPN sent at 5/28/2025 12:54 PM EDT -----  This pt is scheduled for labs on June 10th, but has no orders in his chart. Can you please place orders? Thanks!

## 2025-05-30 ENCOUNTER — HOSPITAL ENCOUNTER (OUTPATIENT)
Dept: PHYSICAL THERAPY | Age: 75
Setting detail: RECURRING SERIES
End: 2025-05-30
Payer: MEDICARE

## 2025-05-30 PROCEDURE — 97110 THERAPEUTIC EXERCISES: CPT

## 2025-05-30 PROCEDURE — 97112 NEUROMUSCULAR REEDUCATION: CPT

## 2025-05-30 ASSESSMENT — PAIN SCALES - GENERAL: PAINLEVEL_OUTOF10: 0

## 2025-05-30 NOTE — THERAPY DISCHARGE
Nilay Sprague  : 1950  Primary: Medicare Part A And B (Medicare)  Secondary:  FOR LIFE MEDICARE SUPP [facility] @ [address]  [facility] [address]  Phone: [phone]  Fax: [phone] Plan Frequency: 1-2 times per week for 8-12 weeks    Plan of Care/Certification Expiration Date: 25        Plan of Care/Certification Expiration Date:  Plan of Care/Certification Expiration Date: 25    Frequency/Duration: Plan Frequency: 1-2 times per week for 8-12 weeks      Time In/Out:   Time In: 1300  Time Out: 1348      PT Visit Info:    Progress Note Due Date: 25  Total # of Visits to Date: 21  Progress Note Counter: 1      Visit Count:  21                OUTPATIENT PHYSICAL THERAPY:             Discharge Summary 2025               Episode (PT: Parkinsons)         Treatment Diagnosis:     Other abnormalities of gait and mobility  Balance disorder  Abnormal posture  Medical/Referring Diagnosis:    Primary parkinsonism (HCC)  Gait disturbance       Referring Physician:  Pete Kimble MD MD Orders:  PT Eval and Treat   Return MD Appt:  25  Date of Onset:  Onset Date:  (May 2024)     Allergies:  Patient has no known allergies.  Restrictions/Precautions:    Fall Precautions:        Medications Last Reviewed: 2025     SUBJECTIVE   History of Injury/Illness (Reason for Referral):  May 2024 started on meds. Officially diagnosed with testing recently    Tend to lean forward.  No falls.    Rolling over in bed.  Standing up straight.  Standing up from sitting down.   Walking is a problem      Patient Stated Goal(s):  \"To improve posture, mobility, balance, gait\"  Initial Pain Level:      0/10   Post Session Pain Level:     0/10  Past Medical History/Comorbidities:   Mr. Sprague  has a past medical history of Allergies, Arthritis, Bilateral primary osteoarthritis of knee, Calculus of kidney, Cancer (HCC), Cataracts, bilateral, Chronic

## 2025-05-30 NOTE — PROGRESS NOTES
Nilay Sprague  : 1950  Primary: Medicare Part A And B (Medicare)  Secondary:  FOR LIFE MEDICARE SUPP [facility] @ [address]  [facility] [address]  Phone: [phone]  Fax: [phone] Plan Frequency: 1-2 times per week for 8-12 weeks    Plan of Care/Certification Expiration Date: 25        Plan of Care/Certification Expiration Date:  Plan of Care/Certification Expiration Date: 25    Frequency/Duration: Plan Frequency: 1-2 times per week for 8-12 weeks      Time In/Out:   Time In: 1300  Time Out: 1348      PT Visit Info:     Progress Note Due Date: 25  Total # of Visits to Date: 21  Progress Note Counter: 1      Visit Count:  21    OUTPATIENT PHYSICAL THERAPY:   Treatment Note 2025       Episode  (PT: Parkinsons)               Treatment Diagnosis:    Other abnormalities of gait and mobility  Balance disorder  Abnormal posture  Medical/Referring Diagnosis:    Primary parkinsonism (HCC)  Gait disturbance      Referring Physician:  Pete Kimble MD MD Orders:  PT Eval and Treat   Return MD Appt:  25   Date of Onset:  Onset Date:  (May 2024)     Allergies:   Patient has no known allergies.  Restrictions/Precautions:   Fall Precautions:        Interventions Planned (Treatment may consist of any combination of the following):     See Assessment Note    Subjective Comments:  Been doing okay.    Initial Pain Level:     0/10   Post Session Pain Level:      0/10   Medications Last Reviewed: 2025  Updated Objective Findings:  None Today  Treatment   THERAPEUTIC EXERCISE: ( 18 minutes):    Exercises per grid below to improve mobility and strength.  Required minimal verbal cues to promote proper body alignment, promote proper body posture, and promote proper body mechanics.  Progressed resistance, range, repetitions, and complexity of movement as indicated.   Date  25   Activity/Exercise    Standing against wall  Working on  semitandem   Blue therafoam eyes open and eyes closed   Standing with feet tandem      Single leg stance      X1/X2 Viewing exercises        Hallpike-Raven testing for BPPV (Benign Paroxysmal Positional Vertigo)        Trevino-Daroff exercises      Canalith Repositioning treatment/Epley Maneuver  for BPPV (Benign Paroxysmal Positional Vertigo)      Smart Equitest Training: See scanned report.          Treatment/Session Summary:    Treatment Assessment:   Patient tolerated session well. He needs some reminders to attain upright posture with standing/walking. Overall, patient is moving better. He was instructed in discharge HEP.   Communication/Consultation:  None today  Equipment provided today:  None  Recommendations/Intent for next treatment session: Next visit will focus on strengthening, mobility and gait training, balance training.    >Total Treatment Billable Duration:  48 minutes   Time In: 1300  Time Out: 1348     YAYA GONZALEZ, PT         Charge Capture  Events  [facility] Portal  Appt Desk  Attendance Report     Future Appointments   Date Time Provider Department Center   6/2/2025  1:00 PM Lupe Stafford SLP [facility] PT/SLP [facility]   6/3/2025  1:00 PM Lupe Stafford SLP [facility]   6/6/2025  1:45 PM Lupe Stafford SLP [facility]   6/10/2025 10:00 AM [facility] DEP   6/10/2025  1:00 PM Lupe Stafford, SLP [facility]   6/12/2025  1:45 PM Lupe Stafford, SLP [facility]   6/17/2025 10:00 AM Cecilio Kimble DO MAT [facility] DEP   6/17/2025  1:00 PM Lupe Stafford, SLP [facility]   6/20/2025  1:45 PM Lupe Stafford SLP [facility]   6/24/2025  2:30 PM Lupe Stafford, SLP [facility]   6/25/2025  9:30 AM Deon Mohamud MD [id number] [facility]   6/27/2025  1:45 PM Lupe Stafford SLP [facility] PT[facility]   7/1/2025  2:00 PM [facility]   7/8/2025 11:00 AM Kathy Ocampo, NP-C [facility]   8/26/2025 12:00 PM Lamin,

## 2025-06-02 ENCOUNTER — OFFICE VISIT (OUTPATIENT)
Age: 75
End: 2025-06-02
Payer: MEDICARE

## 2025-06-02 DIAGNOSIS — R49.8 HYPOPHONIA: ICD-10-CM

## 2025-06-02 DIAGNOSIS — R47.89 WORD FINDING DIFFICULTY: ICD-10-CM

## 2025-06-02 DIAGNOSIS — G20.A1 PARKINSON'S DISEASE WITHOUT DYSKINESIA OR FLUCTUATING MANIFESTATIONS (HCC): Primary | ICD-10-CM

## 2025-06-02 PROCEDURE — 92507 TX SP LANG VOICE COMM INDIV: CPT | Performed by: SPEECH-LANGUAGE PATHOLOGIST

## 2025-06-02 NOTE — PROGRESS NOTES
VOICE DAILY NOTE          Nilay Sprague  1950  [id number]  ICD-10: Treatment Diagnosis: R49.0 Dysphonia; Hoarseness  Precautions/Allergies:   No Known Allergies  TREATMENT PLAN:  Effective Dates: 5/27/2025 TO 7/27/2025.  Frequency/Duration: once a week for 45 Day(s)  REFERRING PHYSICIAN: Pete Kimble MD      TREATMENT DATE: 06/02/25    PROBLEM LIST (Impairments causing functional limitations):  Dysphonia   INTERVENTIONS PLANNED: (Treatment may consist of any combination of the following)    [x] Speech/Lang Therapy  95650 [] Dysphagia Therapy  17772   [] TherIvntj Cog Funcj Contact (1st 15 min)  69842 [] TherIvntj Cog Funcj Contact (addt'l 15 min)  64796         Session #: 1  Length of session: ____   minutes   1 2 3 4 5 6 7 8 9 10 Cueing Results   Produce spontaneous  conversation at 85-90 dB. 70 74 72 68 66 69 63 69 66 68 No cueing 0/10                  Warm up voice at 85-90 dB. 88 88 85 86 88 88 86 82 80 80 [] 0      [] min  [x] mod [] max 7/10                  Sustain “ah” for   10 seconds. 10 10 10 10 10 10 10 10 10 10 [x] 0      [] min  [] mod [] max 10/10   Sustain “ah” at 85-90 dB. 93 88 85 84 83 82 89 90 86 85 [] 0      [] min  [x] mod [] max 7/10                  Produce vocal glides    at 85-90 dB. (Lowest dB level) 88 91 86 83 88 86 95 86 88 91 [x] 0     [] min  [] mod [] max 10/10   Produce vocal glides with good,   clear vocal quality (+/-). + + + + + + + + + + [x] 0      [] min  [] mod [] max 10/10                  Recite numerical sequences    At 80-85 dB. 91 84 88 86 88 80 83 85 85 86 [x] 0     [] min  [] mod [] max 10/10                  Read phrases / sentences /  paragraphs  at 75-5 dB. 89 82 75 85 79 82 90 83 85 88 [x] 0      []

## 2025-06-03 ENCOUNTER — OFFICE VISIT (OUTPATIENT)
Age: 75
End: 2025-06-03
Payer: MEDICARE

## 2025-06-03 ENCOUNTER — TELEPHONE (OUTPATIENT)
Dept: CARDIOTHORACIC SURGERY | Age: 75
End: 2025-06-03

## 2025-06-03 DIAGNOSIS — R49.8 HYPOPHONIA: ICD-10-CM

## 2025-06-03 DIAGNOSIS — G20.A1 PARKINSON'S DISEASE WITHOUT DYSKINESIA OR FLUCTUATING MANIFESTATIONS (HCC): Primary | ICD-10-CM

## 2025-06-03 PROCEDURE — 92507 TX SP LANG VOICE COMM INDIV: CPT | Performed by: SPEECH-LANGUAGE PATHOLOGIST

## 2025-06-03 NOTE — PROGRESS NOTES
VOICE DAILY NOTE          Nilay Sprague  1950  [id number]  ICD-10: Treatment Diagnosis: R49.0 Dysphonia; Hoarseness  Precautions/Allergies:   No Known Allergies  TREATMENT PLAN:  Effective Dates: 5/27/2025 TO 7/27/2025.  Frequency/Duration: once a week for 45 Day(s)  REFERRING PHYSICIAN: Pete Kimble MD      TREATMENT DATE: 06/03/25    PROBLEM LIST (Impairments causing functional limitations):  Dysphonia   INTERVENTIONS PLANNED: (Treatment may consist of any combination of the following)    [x] Speech/Lang Therapy  20151 [] Dysphagia Therapy  97247   [] TherIvntj Cog Funcj Contact (1st 15 min)  10071 [] TherIvntj Cog Funcj Contact (addt'l 15 min)  26340         Session #: 2  Length of session: 35  minutes   1 2 3 4 5 6 7 8 9 10 Cueing Results   Produce spontaneous  conversation at 85-90 dB. 68 72 70 66 70 72 66 73  70 75 No cueing 0/10                  Warm up voice at 85-90 dB. 82 85 85 90 86 88 88 87 88 89 [] 0      [] min  [] mod [] max 9/10                  Sustain “ah” for   10 seconds. 10 10 10 10 10 10 10 10 10 10 [x] 0      [] min  [] mod [] max 10/10   Sustain “ah” at 85-90 dB. 85 85 83 87 91 92 89 90 92 90 [x] 0      [] min  [] mod [] max 9/10                  Produce vocal glides    at 85-90 dB. (Lowest dB level) 87 89 86 87 82 89 77 88 86 93 [x] 0     [] min  [] mod [] max 9/10   Produce vocal glides with good,   clear vocal quality (+/-). + + + + + + + + + + [x] 0      [] min  [] mod [] max 10/10                  Recite numerical sequences    At 80-85 dB. 89 86 88 87 84 87 86 86 80 83 [x] 0     [] min  [] mod [] max 10/10                  Read phrases / sentences /  paragraphs  at 75-5 dB. 84 85 84 76 84 80 82 87 75 79 [x] 0      [] min  []

## 2025-06-10 ENCOUNTER — RESULTS FOLLOW-UP (OUTPATIENT)
Dept: INTERNAL MEDICINE CLINIC | Facility: CLINIC | Age: 75
End: 2025-06-10

## 2025-06-10 ENCOUNTER — OFFICE VISIT (OUTPATIENT)
Age: 75
End: 2025-06-10
Payer: MEDICARE

## 2025-06-10 ENCOUNTER — LAB (OUTPATIENT)
Dept: INTERNAL MEDICINE CLINIC | Facility: CLINIC | Age: 75
End: 2025-06-10

## 2025-06-10 DIAGNOSIS — R73.01 IMPAIRED FASTING GLUCOSE: ICD-10-CM

## 2025-06-10 DIAGNOSIS — N40.1 BENIGN PROSTATIC HYPERPLASIA WITH WEAK URINARY STREAM: ICD-10-CM

## 2025-06-10 DIAGNOSIS — G20.A1 PARKINSON'S DISEASE WITHOUT DYSKINESIA OR FLUCTUATING MANIFESTATIONS (HCC): Primary | ICD-10-CM

## 2025-06-10 DIAGNOSIS — R39.12 BENIGN PROSTATIC HYPERPLASIA WITH WEAK URINARY STREAM: ICD-10-CM

## 2025-06-10 DIAGNOSIS — R49.8 HYPOPHONIA: ICD-10-CM

## 2025-06-10 DIAGNOSIS — E78.00 ELEVATED LDL CHOLESTEROL LEVEL: ICD-10-CM

## 2025-06-10 LAB
ALBUMIN SERPL-MCNC: 3.3 G/DL (ref 3.2–4.6)
ALBUMIN/GLOB SERPL: 1.1 (ref 1–1.9)
ALP SERPL-CCNC: 69 U/L (ref 40–129)
ALT SERPL-CCNC: 11 U/L (ref 8–55)
ANION GAP SERPL CALC-SCNC: 11 MMOL/L (ref 7–16)
APPEARANCE UR: CLEAR
AST SERPL-CCNC: 22 U/L (ref 15–37)
BASOPHILS # BLD: 0.02 K/UL (ref 0–0.2)
BASOPHILS NFR BLD: 0.3 % (ref 0–2)
BILIRUB SERPL-MCNC: 0.5 MG/DL (ref 0–1.2)
BILIRUB UR QL: NEGATIVE
BUN SERPL-MCNC: 17 MG/DL (ref 8–23)
CALCIUM SERPL-MCNC: 8.9 MG/DL (ref 8.8–10.2)
CHLORIDE SERPL-SCNC: 106 MMOL/L (ref 98–107)
CHOLEST SERPL-MCNC: 150 MG/DL (ref 0–200)
CO2 SERPL-SCNC: 25 MMOL/L (ref 20–29)
COLOR UR: NORMAL
CREAT SERPL-MCNC: 0.98 MG/DL (ref 0.8–1.3)
DIFFERENTIAL METHOD BLD: NORMAL
EOSINOPHIL # BLD: 0.09 K/UL (ref 0–0.8)
EOSINOPHIL NFR BLD: 1.5 % (ref 0.5–7.8)
ERYTHROCYTE [DISTWIDTH] IN BLOOD BY AUTOMATED COUNT: 13.3 % (ref 11.9–14.6)
EST. AVERAGE GLUCOSE BLD GHB EST-MCNC: 124 MG/DL
GLOBULIN SER CALC-MCNC: 3.2 G/DL (ref 2.3–3.5)
GLUCOSE SERPL-MCNC: 105 MG/DL (ref 70–99)
GLUCOSE UR STRIP.AUTO-MCNC: NEGATIVE MG/DL
HBA1C MFR BLD: 6 % (ref 0–5.6)
HCT VFR BLD AUTO: 42.8 % (ref 41.1–50.3)
HDLC SERPL-MCNC: 47 MG/DL (ref 40–60)
HDLC SERPL: 3.2 (ref 0–5)
HGB BLD-MCNC: 14.4 G/DL (ref 13.6–17.2)
HGB UR QL STRIP: NEGATIVE
IMM GRANULOCYTES # BLD AUTO: 0.02 K/UL (ref 0–0.5)
IMM GRANULOCYTES NFR BLD AUTO: 0.3 % (ref 0–5)
KETONES UR QL STRIP.AUTO: NEGATIVE MG/DL
LDLC SERPL CALC-MCNC: 91 MG/DL (ref 0–100)
LEUKOCYTE ESTERASE UR QL STRIP.AUTO: NEGATIVE
LYMPHOCYTES # BLD: 1.52 K/UL (ref 0.5–4.6)
LYMPHOCYTES NFR BLD: 26 % (ref 13–44)
MCH RBC QN AUTO: 28.8 PG (ref 26.1–32.9)
MCHC RBC AUTO-ENTMCNC: 33.6 G/DL (ref 31.4–35)
MCV RBC AUTO: 85.6 FL (ref 82–102)
MONOCYTES # BLD: 0.55 K/UL (ref 0.1–1.3)
MONOCYTES NFR BLD: 9.4 % (ref 4–12)
NEUTS SEG # BLD: 3.65 K/UL (ref 1.7–8.2)
NEUTS SEG NFR BLD: 62.5 % (ref 43–78)
NITRITE UR QL STRIP.AUTO: NEGATIVE
NRBC # BLD: 0 K/UL (ref 0–0.2)
PH UR STRIP: 5.5 (ref 5–9)
PLATELET # BLD AUTO: 248 K/UL (ref 150–450)
PMV BLD AUTO: 10.5 FL (ref 9.4–12.3)
POTASSIUM SERPL-SCNC: 4.1 MMOL/L (ref 3.5–5.1)
PROT SERPL-MCNC: 6.5 G/DL (ref 6.3–8.2)
PROT UR STRIP-MCNC: NEGATIVE MG/DL
RBC # BLD AUTO: 5 M/UL (ref 4.23–5.6)
SODIUM SERPL-SCNC: 141 MMOL/L (ref 136–145)
SP GR UR REFRACTOMETRY: 1.01 (ref 1–1.02)
TRIGL SERPL-MCNC: 59 MG/DL (ref 0–150)
TSH, 3RD GENERATION: 3.47 UIU/ML (ref 0.27–4.2)
UROBILINOGEN UR QL STRIP.AUTO: 0.2 EU/DL (ref 0.2–1)
VLDLC SERPL CALC-MCNC: 12 MG/DL (ref 6–23)
WBC # BLD AUTO: 5.9 K/UL (ref 4.3–11.1)

## 2025-06-10 PROCEDURE — 92507 TX SP LANG VOICE COMM INDIV: CPT | Performed by: SPEECH-LANGUAGE PATHOLOGIST

## 2025-06-10 NOTE — PROGRESS NOTES
[] min  [] mod [] max 10/10                  Complete simple / complex cognitive-linguistic tasks  (workbook) at 72-78 dB. 76 85 87 85 80 80 84 83 77 82 [x] 0      [] min  [] mod [] max 10/10                  Complete simple / complex cognitive-linguistic tasks  (tx calendar) at 72-78 dB. 83 83 76 72 78 72 75 73 72 72 [x] 0      [] min  [] mod [] max 10/10     Assessment: [x] Progressing toward goals.    [] No change.     [] Other:  [] Patient would continue to benefit from skilled speech pathology services to improve speaking with INTENT  Home Exercise Program:  [x] complete    []  incomplete    SHORT TERM GOALS:   Use INTENT to coordinate respiratory/phonatory/articulatory subsystems in hierarchical speech tasks with min  cues.  Demonstrate use of intent at phrase, sentence, paragraph, and/or conversational levels with min  cues.  Establish a daily home practice routine independently/with appropriate support.    LONG TERM GOAL:  Use intentional speech to maximize functional communication in all settings    PLAN:     [x] Continue current frequency toward long and short term goals.            Lupe Stafford M.Ed CCC-SLP

## 2025-06-11 ENCOUNTER — OFFICE VISIT (OUTPATIENT)
Dept: CARDIOTHORACIC SURGERY | Age: 75
End: 2025-06-11
Payer: MEDICARE

## 2025-06-11 VITALS
OXYGEN SATURATION: 96 % | DIASTOLIC BLOOD PRESSURE: 72 MMHG | SYSTOLIC BLOOD PRESSURE: 104 MMHG | WEIGHT: 219 LBS | HEART RATE: 81 BPM | BODY MASS INDEX: 33.19 KG/M2 | HEIGHT: 68 IN

## 2025-06-11 DIAGNOSIS — I71.21 ANEURYSM OF ASCENDING AORTA WITHOUT RUPTURE: Primary | ICD-10-CM

## 2025-06-11 PROCEDURE — 99204 OFFICE O/P NEW MOD 45 MIN: CPT | Performed by: SURGERY

## 2025-06-11 PROCEDURE — 1159F MED LIST DOCD IN RCRD: CPT | Performed by: SURGERY

## 2025-06-11 PROCEDURE — G8417 CALC BMI ABV UP PARAM F/U: HCPCS | Performed by: SURGERY

## 2025-06-11 PROCEDURE — 1123F ACP DISCUSS/DSCN MKR DOCD: CPT | Performed by: SURGERY

## 2025-06-11 PROCEDURE — G8427 DOCREV CUR MEDS BY ELIG CLIN: HCPCS | Performed by: SURGERY

## 2025-06-11 PROCEDURE — 1036F TOBACCO NON-USER: CPT | Performed by: SURGERY

## 2025-06-11 NOTE — PROGRESS NOTES
acute aortic syndrome with his aneurysm.     Please message with questions or concerns            Bari Villagran MD

## 2025-06-12 ENCOUNTER — OFFICE VISIT (OUTPATIENT)
Age: 75
End: 2025-06-12
Payer: MEDICARE

## 2025-06-12 DIAGNOSIS — G20.A1 PARKINSON'S DISEASE WITHOUT DYSKINESIA OR FLUCTUATING MANIFESTATIONS (HCC): Primary | ICD-10-CM

## 2025-06-12 DIAGNOSIS — R49.8 HYPOPHONIA: ICD-10-CM

## 2025-06-12 PROCEDURE — 92507 TX SP LANG VOICE COMM INDIV: CPT | Performed by: SPEECH-LANGUAGE PATHOLOGIST

## 2025-06-12 NOTE — PROGRESS NOTES
[] min  [] mod [] max 10/10                  Complete simple / complex cognitive-linguistic tasks`  (workbook) at 72-78 dB. 76 74 72 82 78 73 72 68 73 70 [x] 0      [] min  [] mod [] max 10/10                  Complete simple / complex cognitive-linguistic tasks  (tx calendar) at 72-78 dB. 71 74 72 74 78 73 75 77 78 75 [x] 0      [] min  [] mod [] max 10/10     Assessment: [x] Progressing toward goals.    [] No change.     [] Other:  [] Patient would continue to benefit from skilled speech pathology services to improve speaking with INTENT  Home Exercise Program:  [x] complete    []  incomplete    SHORT TERM GOALS:   Use INTENT to coordinate respiratory/phonatory/articulatory subsystems in hierarchical speech tasks with min  cues.  Demonstrate use of intent at phrase, sentence, paragraph, and/or conversational levels with min  cues.  Establish a daily home practice routine independently/with appropriate support.    LONG TERM GOAL:  Use intentional speech to maximize functional communication in all settings    PLAN:     [x] Continue current frequency toward long and short term goals.            Lupe Stafford M.Ed CCC-SLP

## 2025-06-14 NOTE — PROGRESS NOTES
day dosing but patient just taking twice a day therefore will modify prescription to reflect this dosing interval    Orders:    pregabalin (LYRICA) 100 MG capsule; Take 1 capsule by mouth in the morning and at bedtime for 90 days. TAKE IN PLACE OF GABAPENTIN. DO NOT STOP MEDICATION WITHOUT FIRST TALKING TO YOUR DOCTOR. DO NOT DRINK ALCOHOL, DRIVE OR OPERATE HEAVY MACHINERY AFTER USE OF THIS MEDICATION AS IT MAY CAUSE DROWSINESS. DO NOT FILL PRIOR TO 7/3/25 Max Daily Amount: 200 mg      Return for f/u 4-5 months EOV .       Subjective   Patient is a 74-year-old  male who presents to the office today for follow-up.  Since last visit has been diagnosed with Parkinson's currently on carbidopa levodopa.  Does notice improvement in symptoms.  Does suffer from occasional leg cramps and given his history of recurrent DVT will sometimes take 5 mg of Eliquis instead of 2.5 mg out of concern.  Does have some bilateral leg swelling at baseline improved with elevation.  Does have occasional reflux/indigestion with occasional regurgitation.  Working with speech therapy currently.  Denies hematuria, melena or hematochezia.  Does have urinary incontinence.  No chest pain, shortness of breath, abdominal pain or dysuria.  Was diagnosed with macular degeneration, wet and one of his eyes and dry the other currently receiving injections.  Follows with Dr. Emmanuel with [facility].  Does have some increased stress with his diagnoses as well as worried about his wife who is going through breast cancer treatment.  Trigeminal neuralgia well-controlled with Lyrica twice a day.        Review of Systems   Constitutional:  Negative for chills and fever.   HENT:  Negative for congestion and trouble swallowing.    Respiratory:  Negative for shortness of breath and wheezing.    Cardiovascular:  Positive for leg swelling (Chronic bilateral). Negative for chest pain.   Gastrointestinal:  Negative for abdominal pain, anal bleeding and

## 2025-06-14 NOTE — ASSESSMENT & PLAN NOTE
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  Predated prescription sent to pharmacy.  Prescription originally written for 3 times a day dosing but patient just taking twice a day therefore will modify prescription to reflect this dosing interval    Orders:    pregabalin (LYRICA) 100 MG capsule; Take 1 capsule by mouth in the morning and at bedtime for 90 days. TAKE IN PLACE OF GABAPENTIN. DO NOT STOP MEDICATION WITHOUT FIRST TALKING TO YOUR DOCTOR. DO NOT DRINK ALCOHOL, DRIVE OR OPERATE HEAVY MACHINERY AFTER USE OF THIS MEDICATION AS IT MAY CAUSE DROWSINESS. DO NOT FILL PRIOR TO 7/3/25 Max Daily Amount: 200 mg

## 2025-06-14 NOTE — ASSESSMENT & PLAN NOTE
CTA chest on 2/20/24 with 12mm subpleural LLL nodule   CT chest without contrast on 3/4/2025 with stable 13 x 11 mm solid nodule within the left lower lobe  Pulmonology notes from March of this year reviewed documenting patient very low risk for cancer.  Asymptomatic, follow-up repeat noncontrast chest CT in 1 year

## 2025-06-17 ENCOUNTER — OFFICE VISIT (OUTPATIENT)
Dept: INTERNAL MEDICINE CLINIC | Facility: CLINIC | Age: 75
End: 2025-06-17
Payer: MEDICARE

## 2025-06-17 ENCOUNTER — OFFICE VISIT (OUTPATIENT)
Age: 75
End: 2025-06-17
Payer: MEDICARE

## 2025-06-17 VITALS
OXYGEN SATURATION: 95 % | DIASTOLIC BLOOD PRESSURE: 70 MMHG | HEIGHT: 68 IN | BODY MASS INDEX: 33.8 KG/M2 | HEART RATE: 65 BPM | WEIGHT: 223 LBS | TEMPERATURE: 98 F | SYSTOLIC BLOOD PRESSURE: 104 MMHG

## 2025-06-17 DIAGNOSIS — G20.A1 PARKINSON'S DISEASE WITHOUT DYSKINESIA OR FLUCTUATING MANIFESTATIONS (HCC): Primary | ICD-10-CM

## 2025-06-17 DIAGNOSIS — I71.21 ANEURYSM OF ASCENDING AORTA WITHOUT RUPTURE: ICD-10-CM

## 2025-06-17 DIAGNOSIS — Z79.01 CURRENT USE OF LONG TERM ANTICOAGULATION: ICD-10-CM

## 2025-06-17 DIAGNOSIS — R97.20 ELEVATED PSA: ICD-10-CM

## 2025-06-17 DIAGNOSIS — R39.12 BENIGN PROSTATIC HYPERPLASIA WITH WEAK URINARY STREAM: ICD-10-CM

## 2025-06-17 DIAGNOSIS — R73.03 PREDIABETES: ICD-10-CM

## 2025-06-17 DIAGNOSIS — N40.1 BENIGN PROSTATIC HYPERPLASIA WITH WEAK URINARY STREAM: ICD-10-CM

## 2025-06-17 DIAGNOSIS — I82.409 RECURRENT DEEP VEIN THROMBOSIS (DVT) (HCC): ICD-10-CM

## 2025-06-17 DIAGNOSIS — R49.8 HYPOPHONIA: ICD-10-CM

## 2025-06-17 DIAGNOSIS — G50.0 TRIGEMINAL NEURALGIA: ICD-10-CM

## 2025-06-17 DIAGNOSIS — Z86.711 HISTORY OF PULMONARY EMBOLISM: ICD-10-CM

## 2025-06-17 DIAGNOSIS — R91.1 PULMONARY NODULE: ICD-10-CM

## 2025-06-17 PROCEDURE — 1123F ACP DISCUSS/DSCN MKR DOCD: CPT | Performed by: STUDENT IN AN ORGANIZED HEALTH CARE EDUCATION/TRAINING PROGRAM

## 2025-06-17 PROCEDURE — 99215 OFFICE O/P EST HI 40 MIN: CPT | Performed by: STUDENT IN AN ORGANIZED HEALTH CARE EDUCATION/TRAINING PROGRAM

## 2025-06-17 PROCEDURE — 1036F TOBACCO NON-USER: CPT | Performed by: STUDENT IN AN ORGANIZED HEALTH CARE EDUCATION/TRAINING PROGRAM

## 2025-06-17 PROCEDURE — 1126F AMNT PAIN NOTED NONE PRSNT: CPT | Performed by: STUDENT IN AN ORGANIZED HEALTH CARE EDUCATION/TRAINING PROGRAM

## 2025-06-17 PROCEDURE — 1159F MED LIST DOCD IN RCRD: CPT | Performed by: STUDENT IN AN ORGANIZED HEALTH CARE EDUCATION/TRAINING PROGRAM

## 2025-06-17 PROCEDURE — G8427 DOCREV CUR MEDS BY ELIG CLIN: HCPCS | Performed by: STUDENT IN AN ORGANIZED HEALTH CARE EDUCATION/TRAINING PROGRAM

## 2025-06-17 PROCEDURE — 92507 TX SP LANG VOICE COMM INDIV: CPT | Performed by: SPEECH-LANGUAGE PATHOLOGIST

## 2025-06-17 PROCEDURE — G8417 CALC BMI ABV UP PARAM F/U: HCPCS | Performed by: STUDENT IN AN ORGANIZED HEALTH CARE EDUCATION/TRAINING PROGRAM

## 2025-06-17 RX ORDER — PREGABALIN 100 MG/1
100 CAPSULE ORAL 2 TIMES DAILY
Qty: 180 CAPSULE | Refills: 0 | Status: SHIPPED | OUTPATIENT
Start: 2025-07-03 | End: 2025-10-01

## 2025-06-17 ASSESSMENT — ENCOUNTER SYMPTOMS
TROUBLE SWALLOWING: 0
SHORTNESS OF BREATH: 0
WHEEZING: 0
ABDOMINAL PAIN: 0
BLOOD IN STOOL: 0
ANAL BLEEDING: 0

## 2025-06-17 NOTE — PROGRESS NOTES
VOICE DAILY NOTE          Nilay Sprague  1950  [id number]  ICD-10: Treatment Diagnosis: R49.0 Dysphonia; Hoarseness  Precautions/Allergies:   No Known Allergies  TREATMENT PLAN:  Effective Dates: 5/27/2025 TO 7/27/2025.  Frequency/Duration: once a week for 45 Day(s)  REFERRING PHYSICIAN: Pete Kimble MD      TREATMENT DATE: 06/17/25    PROBLEM LIST (Impairments causing functional limitations):  Dysphonia   INTERVENTIONS PLANNED: (Treatment may consist of any combination of the following)    [x] Speech/Lang Therapy  17629 [] Dysphagia Therapy  30052   [] TherIvntj Cog Funcj Contact (1st 15 min)  60153 [] TherIvntj Cog Funcj Contact (addt'l 15 min)  69014         Session #: Level II Lesson 4   Length of session: 40  minutes   1 2 3 4 5 6 7 8 9 10 Cueing Results   Produce spontaneous  conversation at 85-90 dB. 80 72 73 78 75 75 72 75 73 77 No cueing 0/10                  Warm up voice at 85-90 dB. 82 86 88 85 85 85 83 85 85 89 [] 0      [] min  [] mod [] max 9/10                  Sustain “ah” for   10 seconds. 10 10 10 10 10 10 10 10 10 10 [x] 0      [] min  [] mod [] max 10/10   Sustain “ah” at 85-90 dB. 86 85 87 88 88 88 87 87 85 86 [x] 0      [] min  [] mod [] max 10/10                  Produce vocal glides    at 85-90 dB. (Lowest dB level) 89 90 87 86 88 85 93 85 90 85 [x] 0     [] min  [] mod [] max 9/10   Produce vocal glides with good,   clear vocal quality (+/-). + + + + + + + + + + [x] 0      [] min  [] mod [] max 10/10                  Recite numerical sequences    At 80-85 dB. 86 84 87 86 89 80 86 86 89 86 [x] 0     [] min  [] mod [] max 10/10                  Read phrases / sentences /  paragraphs  at 75-5 dB. 85 82 84 82 85 86 85 83 86 82 [x] 0

## 2025-06-19 ENCOUNTER — HOSPITAL ENCOUNTER (OUTPATIENT)
Dept: CT IMAGING | Age: 75
Discharge: HOME | End: 2025-06-22
Attending: SURGERY
Payer: MEDICARE

## 2025-06-19 DIAGNOSIS — I71.21 ANEURYSM OF ASCENDING AORTA WITHOUT RUPTURE: ICD-10-CM

## 2025-06-19 PROCEDURE — 71275 CT ANGIOGRAPHY CHEST: CPT | Performed by: RADIOLOGY

## 2025-06-19 PROCEDURE — 71275 CT ANGIOGRAPHY CHEST: CPT

## 2025-06-19 PROCEDURE — 6360000004 HC RX CONTRAST MEDICATION: Performed by: SURGERY

## 2025-06-19 RX ORDER — IOPAMIDOL 755 MG/ML
100 INJECTION, SOLUTION INTRAVASCULAR
Status: COMPLETED | OUTPATIENT
Start: 2025-06-19 | End: 2025-06-19

## 2025-06-19 RX ADMIN — IOPAMIDOL 100 ML: 755 INJECTION, SOLUTION INTRAVENOUS at 11:37

## 2025-06-20 DIAGNOSIS — I71.21 ANEURYSM OF ASCENDING AORTA WITHOUT RUPTURE: Primary | ICD-10-CM

## 2025-06-24 ENCOUNTER — CLINICAL DOCUMENTATION (OUTPATIENT)
Dept: CARDIOTHORACIC SURGERY | Age: 75
End: 2025-06-24

## 2025-06-24 NOTE — PROGRESS NOTES
I have reviewed Mr. Sprague's CTA of his chest. His ascending aortic aneurysm is measured at 5.0cm x 5.1cm. This is largely stable in size from his previous CT with contrast imaging from 2024.     He is high risk for prophylactic ascending aortic aneurysm replacement due to his parkinson's disease.     I believe in this scenario it is also helpful to make note that his Aortic Aneurysm Size to Height index is 2.95 and favorable for surveillance in place of replacement.     The sum of this information is supports surveillance of his aneurysm over prophylactic repair.     I have called Mr. Sprague to discuss these results and my recommendations for surveillance with CTA of the chest 12 months.    Please message with questions or concerns

## 2025-07-01 ENCOUNTER — HOSPITAL ENCOUNTER (OUTPATIENT)
Dept: LAB | Age: 75
Discharge: HOME | End: 2025-07-01
Payer: MEDICARE

## 2025-07-01 DIAGNOSIS — Z86.718 HISTORY OF DVT IN ADULTHOOD: ICD-10-CM

## 2025-07-01 LAB
ALBUMIN SERPL-MCNC: 3.4 G/DL (ref 3.2–4.6)
ALBUMIN/GLOB SERPL: 1 (ref 1–1.9)
ALP SERPL-CCNC: 71 U/L (ref 40–129)
ALT SERPL-CCNC: 7 U/L (ref 8–55)
ANION GAP SERPL CALC-SCNC: 9 MMOL/L (ref 7–16)
AST SERPL-CCNC: 17 U/L (ref 15–37)
BASOPHILS # BLD: 0.03 K/UL (ref 0–0.2)
BASOPHILS NFR BLD: 0.4 % (ref 0–2)
BILIRUB SERPL-MCNC: 0.4 MG/DL (ref 0–1.2)
BUN SERPL-MCNC: 21 MG/DL (ref 8–23)
CALCIUM SERPL-MCNC: 8.4 MG/DL (ref 8.8–10.2)
CHLORIDE SERPL-SCNC: 106 MMOL/L (ref 98–107)
CO2 SERPL-SCNC: 23 MMOL/L (ref 20–29)
CREAT SERPL-MCNC: 0.9 MG/DL (ref 0.8–1.3)
D DIMER PPP FEU-MCNC: 0.31 UG/ML(FEU)
DIFFERENTIAL METHOD BLD: NORMAL
EOSINOPHIL # BLD: 0.13 K/UL (ref 0–0.8)
EOSINOPHIL NFR BLD: 1.9 % (ref 0.5–7.8)
ERYTHROCYTE [DISTWIDTH] IN BLOOD BY AUTOMATED COUNT: 13 % (ref 11.9–14.6)
GLOBULIN SER CALC-MCNC: 3.4 G/DL (ref 2.3–3.5)
GLUCOSE SERPL-MCNC: 108 MG/DL (ref 70–99)
HCT VFR BLD AUTO: 44.2 % (ref 41.1–50.3)
HGB BLD-MCNC: 14.8 G/DL (ref 13.6–17.2)
IMM GRANULOCYTES # BLD AUTO: 0.02 K/UL (ref 0–0.5)
IMM GRANULOCYTES NFR BLD AUTO: 0.3 % (ref 0–5)
LYMPHOCYTES # BLD: 1.7 K/UL (ref 0.5–4.6)
LYMPHOCYTES NFR BLD: 25 % (ref 13–44)
MCH RBC QN AUTO: 29.1 PG (ref 26.1–32.9)
MCHC RBC AUTO-ENTMCNC: 33.5 G/DL (ref 31.4–35)
MCV RBC AUTO: 86.8 FL (ref 82–102)
MONOCYTES # BLD: 0.56 K/UL (ref 0.1–1.3)
MONOCYTES NFR BLD: 8.2 % (ref 4–12)
NEUTS SEG # BLD: 4.35 K/UL (ref 1.7–8.2)
NEUTS SEG NFR BLD: 64.2 % (ref 43–78)
NRBC # BLD: 0 K/UL (ref 0–0.2)
PLATELET # BLD AUTO: 259 K/UL (ref 150–450)
PMV BLD AUTO: 9.9 FL (ref 9.4–12.3)
POTASSIUM SERPL-SCNC: 3.9 MMOL/L (ref 3.5–5.1)
PROT SERPL-MCNC: 6.8 G/DL (ref 6.3–8.2)
RBC # BLD AUTO: 5.09 M/UL (ref 4.23–5.6)
SODIUM SERPL-SCNC: 139 MMOL/L (ref 136–145)
WBC # BLD AUTO: 6.8 K/UL (ref 4.3–11.1)

## 2025-07-01 PROCEDURE — 85379 FIBRIN DEGRADATION QUANT: CPT

## 2025-07-01 PROCEDURE — 80053 COMPREHEN METABOLIC PANEL: CPT

## 2025-07-01 PROCEDURE — 85025 COMPLETE CBC W/AUTO DIFF WBC: CPT

## 2025-07-01 PROCEDURE — 36415 COLL VENOUS BLD VENIPUNCTURE: CPT

## 2025-07-08 ENCOUNTER — OFFICE VISIT (OUTPATIENT)
Dept: ONCOLOGY | Age: 75
End: 2025-07-08
Payer: MEDICARE

## 2025-07-08 VITALS
RESPIRATION RATE: 16 BRPM | OXYGEN SATURATION: 96 % | HEIGHT: 68 IN | WEIGHT: 222.4 LBS | BODY MASS INDEX: 33.71 KG/M2 | SYSTOLIC BLOOD PRESSURE: 142 MMHG | HEART RATE: 72 BPM | DIASTOLIC BLOOD PRESSURE: 90 MMHG | TEMPERATURE: 97.7 F

## 2025-07-08 DIAGNOSIS — D68.59 HYPERCOAGULABLE STATE: Primary | ICD-10-CM

## 2025-07-08 DIAGNOSIS — Z86.718 HISTORY OF DVT IN ADULTHOOD: ICD-10-CM

## 2025-07-08 DIAGNOSIS — Z79.01 CURRENT USE OF LONG TERM ANTICOAGULATION: ICD-10-CM

## 2025-07-08 PROCEDURE — G8427 DOCREV CUR MEDS BY ELIG CLIN: HCPCS | Performed by: NURSE PRACTITIONER

## 2025-07-08 PROCEDURE — 1036F TOBACCO NON-USER: CPT | Performed by: NURSE PRACTITIONER

## 2025-07-08 PROCEDURE — 1159F MED LIST DOCD IN RCRD: CPT | Performed by: NURSE PRACTITIONER

## 2025-07-08 PROCEDURE — G8417 CALC BMI ABV UP PARAM F/U: HCPCS | Performed by: NURSE PRACTITIONER

## 2025-07-08 PROCEDURE — 1126F AMNT PAIN NOTED NONE PRSNT: CPT | Performed by: NURSE PRACTITIONER

## 2025-07-08 PROCEDURE — 1160F RVW MEDS BY RX/DR IN RCRD: CPT | Performed by: NURSE PRACTITIONER

## 2025-07-08 PROCEDURE — 1123F ACP DISCUSS/DSCN MKR DOCD: CPT | Performed by: NURSE PRACTITIONER

## 2025-07-08 PROCEDURE — 99214 OFFICE O/P EST MOD 30 MIN: CPT | Performed by: NURSE PRACTITIONER

## 2025-07-08 ASSESSMENT — PATIENT HEALTH QUESTIONNAIRE - PHQ9
2. FEELING DOWN, DEPRESSED OR HOPELESS: NOT AT ALL
SUM OF ALL RESPONSES TO PHQ QUESTIONS 1-9: 0
1. LITTLE INTEREST OR PLEASURE IN DOING THINGS: NOT AT ALL
SUM OF ALL RESPONSES TO PHQ QUESTIONS 1-9: 0

## 2025-07-08 NOTE — PROGRESS NOTES
clot recurrence.  Hopefully we should be able to get him off anticoagulation in the near future once D-dimer normalizes.    1/19/2022: Remains on apixaban 5 mg twice daily.  Trying to be more regular with his compression stockings.  Denies any bleeding, falls or trauma.  Planning to drive to [place] in 5/22.  This is a regular trip he makes once or twice a year.  Exam with bilateral LE 1+ swelling.  Blood work unremarkable.  D-dimer pending and will be followed.  Recent bilateral LE Dopplers negative including upper calf veins.  Results shared, if D-dimer stays negative then will stop his apixaban in 2 months time and change over to once daily baby aspirin.  Given regular long road trip to [place], he will benefit from enoxaparin prophylactic dosing on days of travel.  We will see him back in 3 months time.      4/20/2022: Diagnosed with COVID-19 infection 1/25/2022.  Recovered well, remained outpatient.  He was switched over from apixaban to daily baby aspirin 3/16/2021.  Previous D-dimers of remain negative.  Today reports doing well, denies any new LE swelling or discomfort, denies any shortness of breath.  Compliant with his compression stockings.  Regular with his daily baby aspirin.  We discussed options moving forward, including low-dose anticoagulation with enoxaparin on days of long travel (patient drives out to [place] and back at least once a year).  For now his preference is to forego enoxaparin prophylaxis on days of travel but will let us know if he decides otherwise.  He was educated on S/S of recurrent thrombophilia and to seek urgent care in such a case.  We will see him back in 9 months time.  Sooner if needed.    8/26/2024: Patient hospitalized a few days after after TURP in 2/24 with bilateral PE including saddle PE and right heart strain.  Initially admitted to ICU, underwent IR directed arterial thrombectomy, and subsequently placed on DOAC.  Discussed role of hypercoagulable workup though

## 2025-07-21 DIAGNOSIS — G50.0 TRIGEMINAL NEURALGIA: ICD-10-CM

## 2025-07-22 RX ORDER — PREGABALIN 100 MG/1
100 CAPSULE ORAL 2 TIMES DAILY
Qty: 180 CAPSULE | Refills: 0 | Status: SHIPPED | OUTPATIENT
Start: 2025-07-22 | End: 2025-10-20

## 2025-07-22 NOTE — TELEPHONE ENCOUNTER
PDMP Monitoring:    Last PDMP Oliver as Reviewed:  Review User Review Instant Review Result   JALENPEDRO 7/22/2025  2:38 PM Reviewed PDMP [1]     [unfilled]  Urine Drug Screenings (1 yr)    No resulted procedures found.       Medication Contract and Consent for Opioid Use Documents Filed        No documents found

## 2025-08-25 ASSESSMENT — ENCOUNTER SYMPTOMS
VOICE CHANGE: 0
COUGH: 0
ABDOMINAL PAIN: 0

## 2025-08-26 ENCOUNTER — OFFICE VISIT (OUTPATIENT)
Dept: NEUROLOGY | Age: 75
End: 2025-08-26
Payer: MEDICARE

## 2025-08-26 VITALS
HEART RATE: 66 BPM | BODY MASS INDEX: 33.6 KG/M2 | OXYGEN SATURATION: 95 % | DIASTOLIC BLOOD PRESSURE: 99 MMHG | SYSTOLIC BLOOD PRESSURE: 147 MMHG | WEIGHT: 221 LBS

## 2025-08-26 DIAGNOSIS — G20.A1 PARKINSON'S DISEASE WITHOUT DYSKINESIA OR FLUCTUATING MANIFESTATIONS (HCC): Primary | ICD-10-CM

## 2025-08-26 DIAGNOSIS — G50.0 TRIGEMINAL NEURALGIA: ICD-10-CM

## 2025-08-26 DIAGNOSIS — M54.50 CHRONIC BILATERAL LOW BACK PAIN WITHOUT SCIATICA: ICD-10-CM

## 2025-08-26 DIAGNOSIS — R49.8 HYPOPHONIA: ICD-10-CM

## 2025-08-26 DIAGNOSIS — G89.29 CHRONIC BILATERAL LOW BACK PAIN WITHOUT SCIATICA: ICD-10-CM

## 2025-08-26 PROCEDURE — 1159F MED LIST DOCD IN RCRD: CPT | Performed by: PSYCHIATRY & NEUROLOGY

## 2025-08-26 PROCEDURE — 99215 OFFICE O/P EST HI 40 MIN: CPT | Performed by: PSYCHIATRY & NEUROLOGY

## 2025-08-26 PROCEDURE — 1123F ACP DISCUSS/DSCN MKR DOCD: CPT | Performed by: PSYCHIATRY & NEUROLOGY

## 2025-08-26 PROCEDURE — 1036F TOBACCO NON-USER: CPT | Performed by: PSYCHIATRY & NEUROLOGY

## 2025-08-26 PROCEDURE — G8427 DOCREV CUR MEDS BY ELIG CLIN: HCPCS | Performed by: PSYCHIATRY & NEUROLOGY

## 2025-08-26 PROCEDURE — G8417 CALC BMI ABV UP PARAM F/U: HCPCS | Performed by: PSYCHIATRY & NEUROLOGY

## 2025-08-26 RX ORDER — PREGABALIN 75 MG/1
75 CAPSULE ORAL 2 TIMES DAILY
Qty: 60 CAPSULE | Refills: 5 | Status: SHIPPED | OUTPATIENT
Start: 2025-08-26 | End: 2026-02-22

## (undated) DEVICE — SOLUTION IRRIG 3000ML 0.9% SOD CHL FLX CONT 0797208] ICU MEDICAL INC]

## (undated) DEVICE — SYR LR LCK 1ML GRAD NSAF 30ML --

## (undated) DEVICE — DRAPE SHT 3 QTR PROXIMA 53X77 --

## (undated) DEVICE — KIT INT FIX FEM TIB CKPT MAKOPLASTY

## (undated) DEVICE — PIN BNE FIX 4X140MM STRL -- 1EA=2PK MAKO

## (undated) DEVICE — (D)PREP SKN CHLRAPRP APPL 26ML -- CONVERT TO ITEM 371833

## (undated) DEVICE — SOLUTION IV 1000ML 0.9% SOD CHL

## (undated) DEVICE — STERILE PRESSURE PROTECTOR PAD® FOR DE MAYO UNIVERSAL DISTRACTOR® (10/CASE): Brand: DE MAYO UNIVERSAL DISTRACTOR®

## (undated) DEVICE — TOTAL KNEE DR WATSON: Brand: MEDLINE INDUSTRIES, INC.

## (undated) DEVICE — SYR 10ML LUER LOK 1/5ML GRAD --

## (undated) DEVICE — GOWN,REINFORCED,POLY,AURORA,XXLARGE,STR: Brand: MEDLINE

## (undated) DEVICE — BANDAGE COBAN 4 IN COMPR W4INXL5YD FOAM COHESIVE QUIK STK SELF ADH SFT

## (undated) DEVICE — NEEDLE HYPO 21GA L1.5IN INTRAMUSCULAR S STL LATCH BVL UP

## (undated) DEVICE — Z DISCONTINUED USE 2744636  DRESSING AQUACEL 14 IN ALG W3.5XL14IN POLYUR FLM CVR W/ HYDRCOLL

## (undated) DEVICE — TRAY PREP DRY W/ PREM GLV 2 APPL 6 SPNG 2 UNDPD 1 OVERWRAP

## (undated) DEVICE — BUTTON SWITCH PENCIL BLADE ELECTRODE, HOLSTER: Brand: EDGE

## (undated) DEVICE — KIT PROC KNE TRACKING PK/1 -- VIZADISC MAKO

## (undated) DEVICE — CONTAINER,SPECIMEN,O.R.STRL,4.5OZ: Brand: MEDLINE

## (undated) DEVICE — BLADE SURG SAW STD S STL OSC W/ SERR EDGE DISP

## (undated) DEVICE — HANDPIECE SET WITH COAXIAL HIGH FLOW TIP AND SUCTION TUBE: Brand: INTERPULSE

## (undated) DEVICE — REM POLYHESIVE ADULT PATIENT RETURN ELECTRODE: Brand: VALLEYLAB

## (undated) DEVICE — SUTURE MCRYL SZ 2-0 L27IN ABSRB UD CP-1 1 L36MM 1/2 CIR REV Y266H

## (undated) DEVICE — SUTURE FIBERWIRE SZ 2 W/ TAPERED NEEDLE BLUE L38IN NONABSORB BLU L26.5MM 1/2 CIRCLE AR7200

## (undated) DEVICE — T4 HOOD

## (undated) DEVICE — PIN BNE 4X110MM STRL -- 2/PK MAKO

## (undated) DEVICE — DRAPE,TOP,102X53,STERILE: Brand: MEDLINE

## (undated) DEVICE — BIPOLAR SEALER 23-313-1 AQM 9.5XL: Brand: AQUAMANTYS ®

## (undated) DEVICE — 3M™ STERI-DRAPE™ INSTRUMENT POUCH 1018: Brand: STERI-DRAPE™

## (undated) DEVICE — SUTURE STRATAFIX SPRL SZ 1 L5IN ABSRB VLT CT-1 L36MM 1/2 SXPD2B401

## (undated) DEVICE — TANK YANK SHORT STRAIGHT

## (undated) DEVICE — Z DISCONTINUED USE 2423037 APPLICATOR MEDICATED 3 CC CHLORHEXIDINE GLUC 2% CHLORAPREP

## (undated) DEVICE — SOLUTION IV 250ML 0.9% SOD CHL CLR INJ FLX BG CONT PRT CLSR